# Patient Record
Sex: FEMALE | Race: OTHER | HISPANIC OR LATINO
[De-identification: names, ages, dates, MRNs, and addresses within clinical notes are randomized per-mention and may not be internally consistent; named-entity substitution may affect disease eponyms.]

---

## 2017-01-03 ENCOUNTER — APPOINTMENT (OUTPATIENT)
Dept: INTERNAL MEDICINE | Facility: CLINIC | Age: 47
End: 2017-01-03

## 2017-01-18 ENCOUNTER — RX RENEWAL (OUTPATIENT)
Age: 47
End: 2017-01-18

## 2017-01-19 ENCOUNTER — RECORD ABSTRACTING (OUTPATIENT)
Age: 47
End: 2017-01-19

## 2017-02-07 ENCOUNTER — APPOINTMENT (OUTPATIENT)
Dept: INTERNAL MEDICINE | Facility: CLINIC | Age: 47
End: 2017-02-07

## 2017-02-07 VITALS
SYSTOLIC BLOOD PRESSURE: 143 MMHG | WEIGHT: 146 LBS | BODY MASS INDEX: 29.43 KG/M2 | DIASTOLIC BLOOD PRESSURE: 60 MMHG | HEIGHT: 59 IN

## 2017-02-07 VITALS — BODY MASS INDEX: 29.43 KG/M2 | WEIGHT: 146 LBS | HEIGHT: 59 IN

## 2017-02-14 ENCOUNTER — RX RENEWAL (OUTPATIENT)
Age: 47
End: 2017-02-14

## 2017-02-17 LAB
ALBUMIN SERPL-MCNC: 3.7 G/DL
ALBUMIN/GLOB SERPL: 1.28
ALP SERPL-CCNC: 119 IU/L
ALT SERPL-CCNC: 60 IU/L
ANION GAP SERPL CALC-SCNC: 9 MEQ/L
AST SERPL-CCNC: 22 IU/L
BILIRUB SERPL-MCNC: 0.5 MG/DL
BUN SERPL-MCNC: 17 MG/DL
BUN/CREAT SERPL: 36.2 %
CALCIUM SERPL-MCNC: 8.8 MG/DL
CHLORIDE SERPL-SCNC: 106 MEQ/L
CHOLEST SERPL-MCNC: 197 MG/DL
CO2 SERPL-SCNC: 26 MEQ/L
CREAT SERPL-MCNC: 0.47 MG/DL
ESTIMATED AVERGAGE GLUCOSE (NORTH): 120 MG/DL
GFR SERPL CREATININE-BSD FRML MDRD: 141
GLUCOSE SERPL-MCNC: 98 MG/DL
HBA1C MFR BLD: 5.8 %
HDLC SERPL-MCNC: 64 MG/DL
HDLC SERPL: 3.08
LDLC SERPL DIRECT ASSAY-MCNC: 123 MG/DL
POTASSIUM SERPL-SCNC: 4.2 MMOL/L
PROT SERPL-MCNC: 6.6 G/DL
SODIUM SERPL-SCNC: 141 MEQ/L
TRIGL SERPL-MCNC: 113 MG/DL
VLDLC SERPL-MCNC: 22 MG/DL

## 2017-02-28 RX ORDER — DULOXETINE HYDROCHLORIDE 30 MG/1
30 CAPSULE, DELAYED RELEASE PELLETS ORAL
Qty: 30 | Refills: 0 | Status: DISCONTINUED | COMMUNITY
Start: 2017-01-03 | End: 2017-02-28

## 2017-03-15 ENCOUNTER — RX RENEWAL (OUTPATIENT)
Age: 47
End: 2017-03-15

## 2017-03-16 ENCOUNTER — APPOINTMENT (OUTPATIENT)
Dept: SURGERY | Facility: CLINIC | Age: 47
End: 2017-03-16

## 2017-03-21 ENCOUNTER — FORM ENCOUNTER (OUTPATIENT)
Age: 47
End: 2017-03-21

## 2017-04-06 ENCOUNTER — APPOINTMENT (OUTPATIENT)
Dept: SURGERY | Facility: CLINIC | Age: 47
End: 2017-04-06

## 2017-04-07 ENCOUNTER — APPOINTMENT (OUTPATIENT)
Dept: OBGYN | Facility: CLINIC | Age: 47
End: 2017-04-07

## 2017-04-07 VITALS
WEIGHT: 150 LBS | SYSTOLIC BLOOD PRESSURE: 122 MMHG | BODY MASS INDEX: 29.45 KG/M2 | HEIGHT: 60 IN | DIASTOLIC BLOOD PRESSURE: 82 MMHG

## 2017-04-18 RX ORDER — ESCITALOPRAM OXALATE 5 MG/1
5 TABLET ORAL
Qty: 30 | Refills: 0 | Status: DISCONTINUED | COMMUNITY
Start: 2017-03-27 | End: 2017-04-18

## 2017-05-09 ENCOUNTER — APPOINTMENT (OUTPATIENT)
Dept: INTERNAL MEDICINE | Facility: CLINIC | Age: 47
End: 2017-05-09

## 2017-05-09 VITALS
BODY MASS INDEX: 29.45 KG/M2 | HEIGHT: 60 IN | WEIGHT: 150 LBS | SYSTOLIC BLOOD PRESSURE: 104 MMHG | DIASTOLIC BLOOD PRESSURE: 71 MMHG | HEART RATE: 62 BPM

## 2017-05-09 RX ORDER — ESCITALOPRAM OXALATE 20 MG/1
20 TABLET ORAL
Qty: 30 | Refills: 0 | Status: DISCONTINUED | COMMUNITY
Start: 2017-02-28 | End: 2017-05-09

## 2017-05-12 LAB — TSH SERPL DL<=0.005 MIU/L-ACNC: 3.12 UIU/ML

## 2017-05-18 ENCOUNTER — MEDICATION RENEWAL (OUTPATIENT)
Age: 47
End: 2017-05-18

## 2017-05-18 ENCOUNTER — RX RENEWAL (OUTPATIENT)
Age: 47
End: 2017-05-18

## 2017-05-23 ENCOUNTER — APPOINTMENT (OUTPATIENT)
Dept: SURGERY | Facility: CLINIC | Age: 47
End: 2017-05-23

## 2017-05-23 ENCOUNTER — RESULT REVIEW (OUTPATIENT)
Age: 47
End: 2017-05-23

## 2017-05-23 VITALS
WEIGHT: 151 LBS | DIASTOLIC BLOOD PRESSURE: 72 MMHG | BODY MASS INDEX: 29.64 KG/M2 | SYSTOLIC BLOOD PRESSURE: 129 MMHG | HEIGHT: 60 IN

## 2017-05-30 ENCOUNTER — APPOINTMENT (OUTPATIENT)
Dept: INTERNAL MEDICINE | Facility: CLINIC | Age: 47
End: 2017-05-30

## 2017-05-30 VITALS
WEIGHT: 151 LBS | BODY MASS INDEX: 29.64 KG/M2 | SYSTOLIC BLOOD PRESSURE: 124 MMHG | HEIGHT: 60 IN | DIASTOLIC BLOOD PRESSURE: 84 MMHG | HEART RATE: 65 BPM

## 2017-05-30 RX ORDER — HYDROXYZINE HYDROCHLORIDE 25 MG/1
25 TABLET ORAL TWICE DAILY
Qty: 60 | Refills: 4 | Status: DISCONTINUED | COMMUNITY
Start: 2017-02-14 | End: 2017-05-30

## 2017-06-08 ENCOUNTER — OUTPATIENT (OUTPATIENT)
Dept: OUTPATIENT SERVICES | Facility: HOSPITAL | Age: 47
LOS: 1 days | Discharge: HOME | End: 2017-06-08

## 2017-06-08 DIAGNOSIS — M47.812 SPONDYLOSIS WITHOUT MYELOPATHY OR RADICULOPATHY, CERVICAL REGION: ICD-10-CM

## 2017-06-13 ENCOUNTER — APPOINTMENT (OUTPATIENT)
Dept: INTERNAL MEDICINE | Facility: CLINIC | Age: 47
End: 2017-06-13

## 2017-06-13 ENCOUNTER — OTHER (OUTPATIENT)
Age: 47
End: 2017-06-13

## 2017-06-13 VITALS
WEIGHT: 152 LBS | SYSTOLIC BLOOD PRESSURE: 126 MMHG | HEIGHT: 60 IN | BODY MASS INDEX: 29.84 KG/M2 | DIASTOLIC BLOOD PRESSURE: 84 MMHG | HEART RATE: 84 BPM

## 2017-06-13 DIAGNOSIS — M54.2 CERVICALGIA: ICD-10-CM

## 2017-06-13 DIAGNOSIS — Z12.4 ENCOUNTER FOR SCREENING FOR MALIGNANT NEOPLASM OF CERVIX: ICD-10-CM

## 2017-06-13 DIAGNOSIS — R76.11 NONSPECIFIC REACTION TO TUBERCULIN SKIN TEST W/OUT ACTIVE TUBERCULOSIS: ICD-10-CM

## 2017-06-13 DIAGNOSIS — Z86.59 PERSONAL HISTORY OF OTHER MENTAL AND BEHAVIORAL DISORDERS: ICD-10-CM

## 2017-06-13 DIAGNOSIS — J45.902 UNSPECIFIED ASTHMA WITH STATUS ASTHMATICUS: ICD-10-CM

## 2017-06-13 DIAGNOSIS — M54.9 DORSALGIA, UNSPECIFIED: ICD-10-CM

## 2017-06-13 DIAGNOSIS — G89.29 DORSALGIA, UNSPECIFIED: ICD-10-CM

## 2017-06-13 DIAGNOSIS — Z87.42 PERSONAL HISTORY OF OTHER DISEASES OF THE FEMALE GENITAL TRACT: ICD-10-CM

## 2017-06-13 DIAGNOSIS — Z12.72 ENCOUNTER FOR SCREENING FOR MALIGNANT NEOPLASM OF VAGINA: ICD-10-CM

## 2017-06-13 DIAGNOSIS — G89.29 CERVICALGIA: ICD-10-CM

## 2017-06-13 RX ORDER — DOCUSATE SODIUM 100 MG
20.6 (20 BASE) CAPSULE ORAL
Qty: 56 | Refills: 0 | Status: COMPLETED | COMMUNITY
Start: 2017-03-16

## 2017-06-13 RX ORDER — ALBUTEROL SULFATE 90 UG/1
108 (90 BASE) AEROSOL, METERED RESPIRATORY (INHALATION)
Qty: 9 | Refills: 0 | Status: COMPLETED | COMMUNITY
Start: 2017-02-07

## 2017-06-13 RX ORDER — AMOXICILLIN 500 MG/1
500 CAPSULE ORAL
Qty: 28 | Refills: 0 | Status: COMPLETED | COMMUNITY
Start: 2017-03-16

## 2017-06-13 RX ORDER — CLARITHROMYCIN 500 MG/1
500 TABLET, FILM COATED ORAL
Qty: 28 | Refills: 0 | Status: COMPLETED | COMMUNITY
Start: 2017-03-16

## 2017-06-15 ENCOUNTER — APPOINTMENT (OUTPATIENT)
Dept: NUTRITION | Facility: CLINIC | Age: 47
End: 2017-06-15

## 2017-06-16 ENCOUNTER — OUTPATIENT (OUTPATIENT)
Dept: OUTPATIENT SERVICES | Facility: HOSPITAL | Age: 47
LOS: 1 days | Discharge: HOME | End: 2017-06-16

## 2017-06-16 DIAGNOSIS — F43.12 POST-TRAUMATIC STRESS DISORDER, CHRONIC: ICD-10-CM

## 2017-06-28 ENCOUNTER — OUTPATIENT (OUTPATIENT)
Dept: OUTPATIENT SERVICES | Facility: HOSPITAL | Age: 47
LOS: 1 days | Discharge: HOME | End: 2017-06-28

## 2017-06-28 DIAGNOSIS — F33.2 MAJOR DEPRESSIVE DISORDER, RECURRENT SEVERE WITHOUT PSYCHOTIC FEATURES: ICD-10-CM

## 2017-06-28 DIAGNOSIS — M47.812 SPONDYLOSIS WITHOUT MYELOPATHY OR RADICULOPATHY, CERVICAL REGION: ICD-10-CM

## 2017-06-28 DIAGNOSIS — F41.1 GENERALIZED ANXIETY DISORDER: ICD-10-CM

## 2017-06-28 DIAGNOSIS — M54.5 LOW BACK PAIN: ICD-10-CM

## 2017-06-28 DIAGNOSIS — F43.12 POST-TRAUMATIC STRESS DISORDER, CHRONIC: ICD-10-CM

## 2017-06-29 ENCOUNTER — OUTPATIENT (OUTPATIENT)
Dept: OUTPATIENT SERVICES | Facility: HOSPITAL | Age: 47
LOS: 1 days | Discharge: HOME | End: 2017-06-29

## 2017-06-29 ENCOUNTER — APPOINTMENT (OUTPATIENT)
Dept: NUTRITION | Facility: CLINIC | Age: 47
End: 2017-06-29

## 2017-06-29 VITALS — BODY MASS INDEX: 30.47 KG/M2 | WEIGHT: 156 LBS

## 2017-06-29 DIAGNOSIS — K83.8 OTHER SPECIFIED DISEASES OF BILIARY TRACT: ICD-10-CM

## 2017-06-29 DIAGNOSIS — M47.812 SPONDYLOSIS WITHOUT MYELOPATHY OR RADICULOPATHY, CERVICAL REGION: ICD-10-CM

## 2017-07-01 ENCOUNTER — EMERGENCY (EMERGENCY)
Facility: HOSPITAL | Age: 47
LOS: 0 days | Discharge: HOME | End: 2017-07-01
Admitting: INTERNAL MEDICINE

## 2017-07-01 DIAGNOSIS — Z98.890 OTHER SPECIFIED POSTPROCEDURAL STATES: ICD-10-CM

## 2017-07-01 DIAGNOSIS — R21 RASH AND OTHER NONSPECIFIC SKIN ERUPTION: ICD-10-CM

## 2017-07-01 DIAGNOSIS — B02.9 ZOSTER WITHOUT COMPLICATIONS: ICD-10-CM

## 2017-07-01 DIAGNOSIS — M47.812 SPONDYLOSIS WITHOUT MYELOPATHY OR RADICULOPATHY, CERVICAL REGION: ICD-10-CM

## 2017-07-01 DIAGNOSIS — Z79.899 OTHER LONG TERM (CURRENT) DRUG THERAPY: ICD-10-CM

## 2017-07-01 DIAGNOSIS — J45.909 UNSPECIFIED ASTHMA, UNCOMPLICATED: ICD-10-CM

## 2017-07-13 ENCOUNTER — APPOINTMENT (OUTPATIENT)
Dept: SURGERY | Facility: CLINIC | Age: 47
End: 2017-07-13

## 2017-07-13 VITALS
DIASTOLIC BLOOD PRESSURE: 72 MMHG | WEIGHT: 159 LBS | SYSTOLIC BLOOD PRESSURE: 132 MMHG | BODY MASS INDEX: 31.22 KG/M2 | HEIGHT: 60 IN

## 2017-07-14 ENCOUNTER — OUTPATIENT (OUTPATIENT)
Dept: OUTPATIENT SERVICES | Facility: HOSPITAL | Age: 47
LOS: 1 days | Discharge: HOME | End: 2017-07-14

## 2017-07-14 ENCOUNTER — APPOINTMENT (OUTPATIENT)
Dept: INTERNAL MEDICINE | Facility: CLINIC | Age: 47
End: 2017-07-14

## 2017-07-14 VITALS
HEIGHT: 60 IN | BODY MASS INDEX: 31.41 KG/M2 | SYSTOLIC BLOOD PRESSURE: 129 MMHG | HEART RATE: 86 BPM | WEIGHT: 160 LBS | DIASTOLIC BLOOD PRESSURE: 86 MMHG

## 2017-07-14 DIAGNOSIS — K80.20 CALCULUS OF GALLBLADDER W/OUT CHOLECYSTITIS W/OUT OBSTRUCTION: ICD-10-CM

## 2017-07-14 DIAGNOSIS — B02.29 OTHER POSTHERPETIC NERVOUS SYSTEM INVOLVEMENT: ICD-10-CM

## 2017-07-14 DIAGNOSIS — M47.812 SPONDYLOSIS WITHOUT MYELOPATHY OR RADICULOPATHY, CERVICAL REGION: ICD-10-CM

## 2017-07-18 ENCOUNTER — OUTPATIENT (OUTPATIENT)
Dept: OUTPATIENT SERVICES | Facility: HOSPITAL | Age: 47
LOS: 1 days | Discharge: HOME | End: 2017-07-18

## 2017-07-18 DIAGNOSIS — M47.812 SPONDYLOSIS WITHOUT MYELOPATHY OR RADICULOPATHY, CERVICAL REGION: ICD-10-CM

## 2017-07-18 DIAGNOSIS — Z01.818 ENCOUNTER FOR OTHER PREPROCEDURAL EXAMINATION: ICD-10-CM

## 2017-07-18 DIAGNOSIS — K40.90 UNILATERAL INGUINAL HERNIA, WITHOUT OBSTRUCTION OR GANGRENE, NOT SPECIFIED AS RECURRENT: ICD-10-CM

## 2017-07-18 DIAGNOSIS — E66.9 OBESITY, UNSPECIFIED: ICD-10-CM

## 2017-07-18 DIAGNOSIS — J45.909 UNSPECIFIED ASTHMA, UNCOMPLICATED: ICD-10-CM

## 2017-07-21 LAB — HIV1+2 AB SPEC QL IA.RAPID: NONREACTIVE

## 2017-07-28 ENCOUNTER — OUTPATIENT (OUTPATIENT)
Dept: OUTPATIENT SERVICES | Facility: HOSPITAL | Age: 47
LOS: 1 days | Discharge: HOME | End: 2017-07-28

## 2017-07-28 DIAGNOSIS — K83.8 OTHER SPECIFIED DISEASES OF BILIARY TRACT: ICD-10-CM

## 2017-07-28 DIAGNOSIS — K80.12 CALCULUS OF GALLBLADDER WITH ACUTE AND CHRONIC CHOLECYSTITIS WITHOUT OBSTRUCTION: ICD-10-CM

## 2017-07-28 DIAGNOSIS — K82.1 HYDROPS OF GALLBLADDER: ICD-10-CM

## 2017-07-28 DIAGNOSIS — M47.812 SPONDYLOSIS WITHOUT MYELOPATHY OR RADICULOPATHY, CERVICAL REGION: ICD-10-CM

## 2017-07-28 DIAGNOSIS — K82.8 OTHER SPECIFIED DISEASES OF GALLBLADDER: ICD-10-CM

## 2017-08-10 ENCOUNTER — APPOINTMENT (OUTPATIENT)
Dept: SURGERY | Facility: CLINIC | Age: 47
End: 2017-08-10
Payer: MEDICAID

## 2017-08-10 VITALS
WEIGHT: 159 LBS | BODY MASS INDEX: 31.22 KG/M2 | HEIGHT: 60 IN | SYSTOLIC BLOOD PRESSURE: 130 MMHG | DIASTOLIC BLOOD PRESSURE: 84 MMHG

## 2017-08-10 PROCEDURE — 99024 POSTOP FOLLOW-UP VISIT: CPT

## 2017-08-18 ENCOUNTER — APPOINTMENT (OUTPATIENT)
Dept: INTERNAL MEDICINE | Facility: CLINIC | Age: 47
End: 2017-08-18

## 2017-08-22 ENCOUNTER — APPOINTMENT (OUTPATIENT)
Dept: INTERNAL MEDICINE | Facility: CLINIC | Age: 47
End: 2017-08-22

## 2017-08-22 ENCOUNTER — APPOINTMENT (OUTPATIENT)
Age: 47
End: 2017-08-22

## 2017-08-22 ENCOUNTER — OUTPATIENT (OUTPATIENT)
Dept: OUTPATIENT SERVICES | Facility: HOSPITAL | Age: 47
LOS: 1 days | Discharge: HOME | End: 2017-08-22

## 2017-08-22 VITALS
DIASTOLIC BLOOD PRESSURE: 83 MMHG | HEART RATE: 81 BPM | SYSTOLIC BLOOD PRESSURE: 130 MMHG | BODY MASS INDEX: 31.02 KG/M2 | HEIGHT: 60 IN | WEIGHT: 158 LBS

## 2017-08-22 DIAGNOSIS — M47.812 SPONDYLOSIS WITHOUT MYELOPATHY OR RADICULOPATHY, CERVICAL REGION: ICD-10-CM

## 2017-08-22 DIAGNOSIS — G89.29 OTHER CHRONIC PAIN: ICD-10-CM

## 2017-09-25 ENCOUNTER — FORM ENCOUNTER (OUTPATIENT)
Age: 47
End: 2017-09-25

## 2017-09-26 ENCOUNTER — OUTPATIENT (OUTPATIENT)
Dept: OUTPATIENT SERVICES | Facility: HOSPITAL | Age: 47
LOS: 1 days | Discharge: HOME | End: 2017-09-26

## 2017-09-26 DIAGNOSIS — M47.812 SPONDYLOSIS WITHOUT MYELOPATHY OR RADICULOPATHY, CERVICAL REGION: ICD-10-CM

## 2017-09-26 DIAGNOSIS — R92.8 OTHER ABNORMAL AND INCONCLUSIVE FINDINGS ON DIAGNOSTIC IMAGING OF BREAST: ICD-10-CM

## 2017-10-04 ENCOUNTER — APPOINTMENT (OUTPATIENT)
Dept: NUTRITION | Facility: CLINIC | Age: 47
End: 2017-10-04

## 2017-10-13 ENCOUNTER — OUTPATIENT (OUTPATIENT)
Dept: OUTPATIENT SERVICES | Facility: HOSPITAL | Age: 47
LOS: 1 days | Discharge: HOME | End: 2017-10-13

## 2017-10-13 ENCOUNTER — APPOINTMENT (OUTPATIENT)
Dept: OBGYN | Facility: CLINIC | Age: 47
End: 2017-10-13

## 2017-10-13 VITALS — SYSTOLIC BLOOD PRESSURE: 120 MMHG | WEIGHT: 156 LBS | BODY MASS INDEX: 30.47 KG/M2 | DIASTOLIC BLOOD PRESSURE: 70 MMHG

## 2017-10-13 DIAGNOSIS — M47.812 SPONDYLOSIS WITHOUT MYELOPATHY OR RADICULOPATHY, CERVICAL REGION: ICD-10-CM

## 2017-10-18 ENCOUNTER — RESULT REVIEW (OUTPATIENT)
Age: 47
End: 2017-10-18

## 2017-10-23 DIAGNOSIS — Z01.419 ENCOUNTER FOR GYNECOLOGICAL EXAMINATION (GENERAL) (ROUTINE) WITHOUT ABNORMAL FINDINGS: ICD-10-CM

## 2017-10-23 LAB — HPV I/H RISK 1 DNA CVX QL PROBE+SIG AMP: NOT DETECTED

## 2017-11-10 ENCOUNTER — APPOINTMENT (OUTPATIENT)
Dept: INTERNAL MEDICINE | Facility: CLINIC | Age: 47
End: 2017-11-10

## 2017-11-10 ENCOUNTER — OUTPATIENT (OUTPATIENT)
Dept: OUTPATIENT SERVICES | Facility: HOSPITAL | Age: 47
LOS: 1 days | Discharge: HOME | End: 2017-11-10

## 2017-11-10 VITALS
HEIGHT: 60 IN | BODY MASS INDEX: 31.22 KG/M2 | SYSTOLIC BLOOD PRESSURE: 141 MMHG | WEIGHT: 159 LBS | HEART RATE: 86 BPM | DIASTOLIC BLOOD PRESSURE: 90 MMHG

## 2017-11-10 DIAGNOSIS — M47.812 SPONDYLOSIS WITHOUT MYELOPATHY OR RADICULOPATHY, CERVICAL REGION: ICD-10-CM

## 2017-11-21 ENCOUNTER — APPOINTMENT (OUTPATIENT)
Dept: INTERNAL MEDICINE | Facility: CLINIC | Age: 47
End: 2017-11-21

## 2017-12-22 ENCOUNTER — OUTPATIENT (OUTPATIENT)
Dept: OUTPATIENT SERVICES | Facility: HOSPITAL | Age: 47
LOS: 1 days | Discharge: HOME | End: 2017-12-22

## 2017-12-22 ENCOUNTER — APPOINTMENT (OUTPATIENT)
Dept: INTERNAL MEDICINE | Facility: CLINIC | Age: 47
End: 2017-12-22

## 2017-12-22 VITALS
WEIGHT: 150 LBS | HEIGHT: 60 IN | DIASTOLIC BLOOD PRESSURE: 60 MMHG | SYSTOLIC BLOOD PRESSURE: 142 MMHG | BODY MASS INDEX: 29.45 KG/M2 | HEART RATE: 67 BPM

## 2017-12-22 DIAGNOSIS — M47.812 SPONDYLOSIS WITHOUT MYELOPATHY OR RADICULOPATHY, CERVICAL REGION: ICD-10-CM

## 2018-01-02 LAB
CHOLEST SERPL-MCNC: 173 MG/DL
ESTIMATED AVERGAGE GLUCOSE (NORTH): 126 MG/DL
HBA1C MFR BLD: 6 %
HDLC SERPL-MCNC: 40 MG/DL
HDLC SERPL: 4.33
LDLC SERPL DIRECT ASSAY-MCNC: 104 MG/DL
TRIGL SERPL-MCNC: 160 MG/DL
TSH SERPL DL<=0.005 MIU/L-ACNC: 1.94 UIU/ML
VLDLC SERPL-MCNC: 32 MG/DL

## 2018-02-02 ENCOUNTER — APPOINTMENT (OUTPATIENT)
Dept: INTERNAL MEDICINE | Facility: CLINIC | Age: 48
End: 2018-02-02

## 2018-02-02 ENCOUNTER — OUTPATIENT (OUTPATIENT)
Dept: OUTPATIENT SERVICES | Facility: HOSPITAL | Age: 48
LOS: 1 days | Discharge: HOME | End: 2018-02-02

## 2018-02-02 VITALS
BODY MASS INDEX: 29.64 KG/M2 | SYSTOLIC BLOOD PRESSURE: 107 MMHG | WEIGHT: 151 LBS | DIASTOLIC BLOOD PRESSURE: 74 MMHG | HEART RATE: 77 BPM | HEIGHT: 60 IN

## 2018-02-27 ENCOUNTER — OTHER (OUTPATIENT)
Age: 48
End: 2018-02-27

## 2018-03-25 ENCOUNTER — EMERGENCY (EMERGENCY)
Facility: HOSPITAL | Age: 48
LOS: 0 days | Discharge: HOME | End: 2018-03-25
Attending: STUDENT IN AN ORGANIZED HEALTH CARE EDUCATION/TRAINING PROGRAM

## 2018-03-25 VITALS
HEART RATE: 79 BPM | SYSTOLIC BLOOD PRESSURE: 177 MMHG | RESPIRATION RATE: 18 BRPM | OXYGEN SATURATION: 99 % | TEMPERATURE: 98 F | DIASTOLIC BLOOD PRESSURE: 100 MMHG

## 2018-03-25 DIAGNOSIS — M62.830 MUSCLE SPASM OF BACK: ICD-10-CM

## 2018-03-25 DIAGNOSIS — R51 HEADACHE: ICD-10-CM
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
## 2018-03-25 RX ORDER — METOCLOPRAMIDE HCL 10 MG
10 TABLET ORAL ONCE
Qty: 0 | Refills: 0 | Status: COMPLETED | OUTPATIENT
Start: 2018-03-25 | End: 2018-03-25

## 2018-03-25 RX ORDER — METHOCARBAMOL 500 MG/1
1000 TABLET, FILM COATED ORAL ONCE
Qty: 0 | Refills: 0 | Status: COMPLETED | OUTPATIENT
Start: 2018-03-25 | End: 2018-03-25

## 2018-03-25 RX ORDER — KETOROLAC TROMETHAMINE 30 MG/ML
30 SYRINGE (ML) INJECTION ONCE
Qty: 0 | Refills: 0 | Status: DISCONTINUED | OUTPATIENT
Start: 2018-03-25 | End: 2018-03-25

## 2018-03-25 RX ADMIN — Medication 10 MILLIGRAM(S): at 01:58

## 2018-03-25 RX ADMIN — Medication 30 MILLIGRAM(S): at 01:57

## 2018-03-25 RX ADMIN — METHOCARBAMOL 1000 MILLIGRAM(S): 500 TABLET, FILM COATED ORAL at 01:57

## 2018-03-25 NOTE — ED PROVIDER NOTE - OBJECTIVE STATEMENT
47 yo F with a hx of chronic back pain s/p cervical spine surgery and lumbar bulging disc, asthma, presents with neck and back pain. Started on 3/9 after a swinger door hit her on the right side. Progressively worsened, today pain became constant. Described as cramping, squeezing, tightness. Denies blurred vision, HA, fevers, chills, CP, SOB, new weakness, saddle anesthesia, bladder or bowel incontinence, foot drop. Patient ambulates with cane at baseline secondary to chronic low back pain. Sees N[facility] Dr. Strange.

## 2018-03-25 NOTE — ED PROVIDER NOTE - NS ED ROS FT
Constitutional: See HPI.  Eyes: No visual changes, eye pain or discharge.  ENMT: No hearing changes, pain, discharge or infections. +neck pain or stiffness.  Cardiac: No chest pain, SOB or edema. No chest pain with exertion.  Respiratory: No cough or respiratory distress.   GI: No nausea, vomiting, diarrhea or abdominal pain.  : No dysuria, frequency or burning.  MS: No myalgia, muscle weakness, joint pain. + back pain.  Neuro: No headache or weakness. No LOC.  Skin: No skin rash.

## 2018-03-25 NOTE — ED PROVIDER NOTE - PHYSICAL EXAMINATION
AOx4, visibly uncomfortable, speaking in full sentences. Skin  warm and dry, no acute rash. Head normocephalic, atraumatic. PERRLA/EOMI, conjunctiva and sclera clear. MM moist, no nasal discharge.  Pharynx and TM's unremarkable.  No mastoid or temporal ttp. Neck tender paracervically on right, notable spasm in trapezius. Heart RRR s1s2 nl, no rub/murmur. Lungs- No retractions, BS equal, CTAB. Abdomen soft ntnd no r/g. Extremities- moves all, +equal distal pulses, brisk cap refill, sensation wnl, normal ROM. No LE edema, calves nttp b/l. AOx4, CN 2-12 grossly intact. +5/5 strength and sensation wnl in all extremities. No pronator drift, no dysarthria, no ataxia, normal gait.

## 2018-03-25 NOTE — ED PROVIDER NOTE - ATTENDING CONTRIBUTION TO CARE
49 yo F pmh as noted, hx cspine surgery and lumbar herniated disc, p/w R sided neck and back pain x about 2 wks s/p being hit by swinging car door.  Felt ok then, then increasing mm spasm.  worse w/ movment, better in some position.  NO neuro deficit. hematuria/ dysuria, bowel or bladder issues.  PE as noted, pt has sig mm spasm R para cerv and lumbar spine, neuro exam + some paresthesia to L thigh, similar to prior o/w nL motor, grossly non focal; non spinal ttp; pain worse w/ position change.  IMP: MM spasm.  P: analgesia, mm relaxant, reassess.

## 2018-03-31 ENCOUNTER — LABORATORY RESULT (OUTPATIENT)
Age: 48
End: 2018-03-31

## 2018-03-31 ENCOUNTER — OUTPATIENT (OUTPATIENT)
Dept: OUTPATIENT SERVICES | Facility: HOSPITAL | Age: 48
LOS: 1 days | Discharge: HOME | End: 2018-03-31

## 2018-03-31 DIAGNOSIS — Z00.00 ENCOUNTER FOR GENERAL ADULT MEDICAL EXAMINATION WITHOUT ABNORMAL FINDINGS: ICD-10-CM

## 2018-04-06 ENCOUNTER — EMERGENCY (EMERGENCY)
Facility: HOSPITAL | Age: 48
LOS: 0 days | Discharge: HOME | End: 2018-04-06
Attending: STUDENT IN AN ORGANIZED HEALTH CARE EDUCATION/TRAINING PROGRAM | Admitting: INTERNAL MEDICINE

## 2018-04-06 ENCOUNTER — APPOINTMENT (OUTPATIENT)
Dept: INTERNAL MEDICINE | Facility: CLINIC | Age: 48
End: 2018-04-06

## 2018-04-06 ENCOUNTER — OUTPATIENT (OUTPATIENT)
Dept: OUTPATIENT SERVICES | Facility: HOSPITAL | Age: 48
LOS: 1 days | Discharge: HOME | End: 2018-04-06

## 2018-04-06 VITALS
RESPIRATION RATE: 17 BRPM | SYSTOLIC BLOOD PRESSURE: 138 MMHG | DIASTOLIC BLOOD PRESSURE: 91 MMHG | TEMPERATURE: 97 F | HEART RATE: 67 BPM | OXYGEN SATURATION: 98 %

## 2018-04-06 VITALS
SYSTOLIC BLOOD PRESSURE: 141 MMHG | HEART RATE: 76 BPM | DIASTOLIC BLOOD PRESSURE: 85 MMHG | BODY MASS INDEX: 29.25 KG/M2 | WEIGHT: 149 LBS | HEIGHT: 60 IN

## 2018-04-06 DIAGNOSIS — Z79.899 OTHER LONG TERM (CURRENT) DRUG THERAPY: ICD-10-CM

## 2018-04-06 DIAGNOSIS — Z90.49 ACQUIRED ABSENCE OF OTHER SPECIFIED PARTS OF DIGESTIVE TRACT: ICD-10-CM

## 2018-04-06 DIAGNOSIS — Z79.02 LONG TERM (CURRENT) USE OF ANTITHROMBOTICS/ANTIPLATELETS: ICD-10-CM

## 2018-04-06 DIAGNOSIS — J45.909 UNSPECIFIED ASTHMA, UNCOMPLICATED: ICD-10-CM

## 2018-04-06 DIAGNOSIS — Z98.890 OTHER SPECIFIED POSTPROCEDURAL STATES: Chronic | ICD-10-CM

## 2018-04-06 DIAGNOSIS — R10.13 EPIGASTRIC PAIN: ICD-10-CM

## 2018-04-06 DIAGNOSIS — K21.9 GASTRO-ESOPHAGEAL REFLUX DISEASE WITHOUT ESOPHAGITIS: ICD-10-CM

## 2018-04-06 DIAGNOSIS — Z79.51 LONG TERM (CURRENT) USE OF INHALED STEROIDS: ICD-10-CM

## 2018-04-06 DIAGNOSIS — Z79.52 LONG TERM (CURRENT) USE OF SYSTEMIC STEROIDS: ICD-10-CM

## 2018-04-06 DIAGNOSIS — Z98.890 OTHER SPECIFIED POSTPROCEDURAL STATES: ICD-10-CM

## 2018-04-06 DIAGNOSIS — F32.9 MAJOR DEPRESSIVE DISORDER, SINGLE EPISODE, UNSPECIFIED: ICD-10-CM

## 2018-04-06 DIAGNOSIS — Z79.82 LONG TERM (CURRENT) USE OF ASPIRIN: ICD-10-CM

## 2018-04-06 DIAGNOSIS — Z79.891 LONG TERM (CURRENT) USE OF OPIATE ANALGESIC: ICD-10-CM

## 2018-04-06 LAB
ALBUMIN SERPL ELPH-MCNC: 4.3 G/DL — SIGNIFICANT CHANGE UP (ref 3.5–5.2)
ALP SERPL-CCNC: 128 U/L — HIGH (ref 30–115)
ALT FLD-CCNC: 18 U/L — SIGNIFICANT CHANGE UP (ref 0–41)
ANION GAP SERPL CALC-SCNC: 14 MMOL/L — SIGNIFICANT CHANGE UP (ref 7–14)
AST SERPL-CCNC: 10 U/L — SIGNIFICANT CHANGE UP (ref 0–41)
BASOPHILS # BLD AUTO: 0.02 K/UL — SIGNIFICANT CHANGE UP (ref 0–0.2)
BASOPHILS NFR BLD AUTO: 0.3 % — SIGNIFICANT CHANGE UP (ref 0–1)
BILIRUB DIRECT SERPL-MCNC: <0.2 MG/DL — SIGNIFICANT CHANGE UP (ref 0–0.2)
BILIRUB INDIRECT FLD-MCNC: >0.1 MG/DL — LOW (ref 0.2–1.2)
BILIRUB SERPL-MCNC: 0.3 MG/DL — SIGNIFICANT CHANGE UP (ref 0.2–1.2)
BUN SERPL-MCNC: 12 MG/DL — SIGNIFICANT CHANGE UP (ref 10–20)
CALCIUM SERPL-MCNC: 9.1 MG/DL — SIGNIFICANT CHANGE UP (ref 8.5–10.1)
CHLORIDE SERPL-SCNC: 104 MMOL/L — SIGNIFICANT CHANGE UP (ref 98–110)
CO2 SERPL-SCNC: 26 MMOL/L — SIGNIFICANT CHANGE UP (ref 17–32)
CREAT SERPL-MCNC: 0.5 MG/DL — LOW (ref 0.7–1.5)
EOSINOPHIL # BLD AUTO: 0.04 K/UL — SIGNIFICANT CHANGE UP (ref 0–0.7)
EOSINOPHIL NFR BLD AUTO: 0.7 % — SIGNIFICANT CHANGE UP (ref 0–8)
GLUCOSE SERPL-MCNC: 94 MG/DL — SIGNIFICANT CHANGE UP (ref 70–99)
HCG SERPL-ACNC: <0.6 MIU/ML — SIGNIFICANT CHANGE UP (ref 0–5)
HCT VFR BLD CALC: 36.9 % — LOW (ref 37–47)
HGB BLD-MCNC: 12.5 G/DL — SIGNIFICANT CHANGE UP (ref 12–16)
IMM GRANULOCYTES NFR BLD AUTO: 0.3 % — SIGNIFICANT CHANGE UP (ref 0.1–0.3)
LACTATE SERPL-SCNC: 1 MMOL/L — SIGNIFICANT CHANGE UP (ref 0.5–2.2)
LIDOCAIN IGE QN: 17 U/L — SIGNIFICANT CHANGE UP (ref 7–60)
LYMPHOCYTES # BLD AUTO: 2.16 K/UL — SIGNIFICANT CHANGE UP (ref 1.2–3.4)
LYMPHOCYTES # BLD AUTO: 36.3 % — SIGNIFICANT CHANGE UP (ref 20.5–51.1)
MCHC RBC-ENTMCNC: 28.9 PG — SIGNIFICANT CHANGE UP (ref 27–31)
MCHC RBC-ENTMCNC: 33.9 G/DL — SIGNIFICANT CHANGE UP (ref 32–37)
MCV RBC AUTO: 85.4 FL — SIGNIFICANT CHANGE UP (ref 81–99)
MONOCYTES # BLD AUTO: 0.34 K/UL — SIGNIFICANT CHANGE UP (ref 0.1–0.6)
MONOCYTES NFR BLD AUTO: 5.7 % — SIGNIFICANT CHANGE UP (ref 1.7–9.3)
NEUTROPHILS # BLD AUTO: 3.37 K/UL — SIGNIFICANT CHANGE UP (ref 1.4–6.5)
NEUTROPHILS NFR BLD AUTO: 56.7 % — SIGNIFICANT CHANGE UP (ref 42.2–75.2)
NRBC # BLD: 0 /100 WBCS — SIGNIFICANT CHANGE UP (ref 0–0)
PLATELET # BLD AUTO: 194 K/UL — SIGNIFICANT CHANGE UP (ref 130–400)
POTASSIUM SERPL-MCNC: 4.4 MMOL/L — SIGNIFICANT CHANGE UP (ref 3.5–5)
POTASSIUM SERPL-SCNC: 4.4 MMOL/L — SIGNIFICANT CHANGE UP (ref 3.5–5)
PROT SERPL-MCNC: 7.2 G/DL — SIGNIFICANT CHANGE UP (ref 6–8)
RBC # BLD: 4.32 M/UL — SIGNIFICANT CHANGE UP (ref 4.2–5.4)
RBC # FLD: 12.6 % — SIGNIFICANT CHANGE UP (ref 11.5–14.5)
SODIUM SERPL-SCNC: 144 MMOL/L — SIGNIFICANT CHANGE UP (ref 135–146)
TROPONIN T SERPL-MCNC: <0.01 NG/ML — SIGNIFICANT CHANGE UP
WBC # BLD: 5.95 K/UL — SIGNIFICANT CHANGE UP (ref 4.8–10.8)
WBC # FLD AUTO: 5.95 K/UL — SIGNIFICANT CHANGE UP (ref 4.8–10.8)

## 2018-04-06 RX ORDER — SODIUM CHLORIDE 9 MG/ML
3 INJECTION INTRAMUSCULAR; INTRAVENOUS; SUBCUTANEOUS ONCE
Qty: 0 | Refills: 0 | Status: COMPLETED | OUTPATIENT
Start: 2018-04-06 | End: 2018-04-06

## 2018-04-06 RX ADMIN — SODIUM CHLORIDE 3 MILLILITER(S): 9 INJECTION INTRAMUSCULAR; INTRAVENOUS; SUBCUTANEOUS at 17:57

## 2018-04-06 NOTE — ED ADULT NURSE NOTE - PMH
Anxiety    Asthma, unspecified asthma severity, unspecified whether complicated, unspecified whether persistent    Chronic back pain, unspecified back location, unspecified back pain laterality    Depression, unspecified depression type    Gastroesophageal reflux disease, esophagitis presence not specified    History of cholecystectomy    PTSD (post-traumatic stress disorder)

## 2018-04-06 NOTE — ED ADULT NURSE NOTE - OBJECTIVE STATEMENT
Pt sent from [facility] for evaluation of intermittent upper abdominal pressure for a few days. Pt denies N/V/D. States she usually has BM every day, now every other day.

## 2018-04-06 NOTE — ED PROVIDER NOTE - NS ED ROS FT
Constitutional:  See HPI  Eyes:  No visual changes  ENMT: No neck pain or stiffness  Cardiac:  No chest pain  Respiratory:  No cough or respiratory distress.   GI:  See HPI  :  No dysuria, frequency or burning.  MS:  No back pain.  Neuro:  No headache   Skin:  No skin rash  Except as documented in the HPI,  all other systems are negative

## 2018-04-06 NOTE — ED PROVIDER NOTE - PHYSICAL EXAMINATION
VITAL SIGNS: I have reviewed nursing notes and confirm.  CONSTITUTIONAL: NAD  SKIN: Warm dry  HEAD:NCAT  EYES: NL inspection  ENT: MMM  NECK: Supple; non tender.  CARD: RRR  RESP: CTAB  ABD: S/NT no R/G  EXT: no pedal edema  NEURO: Grossly unremarkable  PSYCH: Cooperative, appropriate.

## 2018-04-06 NOTE — ED PROVIDER NOTE - OBJECTIVE STATEMENT
47 y/o F pmh as noted, sent by pmd for eval intermittent epigastric pain x > 1 yr.  NO clear exacerbating or relieving factors.  no v/n/d.  NO fever.  No cp or SOB.  Had paroxysm of pain at pmd so sent here, but sx resolved PTA.  Pt has no complaints now, identifies pain and resolution as typical of all prior episodes.  gradual onset.  pain "sharp".

## 2018-04-09 ENCOUNTER — OUTPATIENT (OUTPATIENT)
Dept: OUTPATIENT SERVICES | Facility: HOSPITAL | Age: 48
LOS: 1 days | Discharge: HOME | End: 2018-04-09

## 2018-04-09 DIAGNOSIS — Z98.890 OTHER SPECIFIED POSTPROCEDURAL STATES: Chronic | ICD-10-CM

## 2018-04-09 DIAGNOSIS — M54.2 CERVICALGIA: ICD-10-CM

## 2018-04-09 DIAGNOSIS — M54.5 LOW BACK PAIN: ICD-10-CM

## 2018-04-10 ENCOUNTER — OUTPATIENT (OUTPATIENT)
Dept: OUTPATIENT SERVICES | Facility: HOSPITAL | Age: 48
LOS: 1 days | Discharge: HOME | End: 2018-04-10

## 2018-04-10 DIAGNOSIS — M50.01 CERVICAL DISC DISORDER WITH MYELOPATHY, HIGH CERVICAL REGION: ICD-10-CM

## 2018-04-10 DIAGNOSIS — M51.14 INTERVERTEBRAL DISC DISORDERS WITH RADICULOPATHY, THORACIC REGION: ICD-10-CM

## 2018-04-10 DIAGNOSIS — Z98.890 OTHER SPECIFIED POSTPROCEDURAL STATES: Chronic | ICD-10-CM

## 2018-04-10 DIAGNOSIS — M54.6 PAIN IN THORACIC SPINE: ICD-10-CM

## 2018-05-16 ENCOUNTER — OUTPATIENT (OUTPATIENT)
Dept: OUTPATIENT SERVICES | Facility: HOSPITAL | Age: 48
LOS: 1 days | Discharge: HOME | End: 2018-05-16

## 2018-05-16 VITALS
TEMPERATURE: 98 F | DIASTOLIC BLOOD PRESSURE: 72 MMHG | RESPIRATION RATE: 18 BRPM | WEIGHT: 149.91 LBS | SYSTOLIC BLOOD PRESSURE: 149 MMHG | HEART RATE: 64 BPM | OXYGEN SATURATION: 98 %

## 2018-05-16 DIAGNOSIS — M47.812 SPONDYLOSIS WITHOUT MYELOPATHY OR RADICULOPATHY, CERVICAL REGION: ICD-10-CM

## 2018-05-16 DIAGNOSIS — Z98.890 OTHER SPECIFIED POSTPROCEDURAL STATES: Chronic | ICD-10-CM

## 2018-05-16 DIAGNOSIS — Z01.818 ENCOUNTER FOR OTHER PREPROCEDURAL EXAMINATION: ICD-10-CM

## 2018-05-16 LAB
ALBUMIN SERPL ELPH-MCNC: 4.5 G/DL — SIGNIFICANT CHANGE UP (ref 3.5–5.2)
ALP SERPL-CCNC: 149 U/L — HIGH (ref 30–115)
ALT FLD-CCNC: 25 U/L — SIGNIFICANT CHANGE UP (ref 0–41)
ANION GAP SERPL CALC-SCNC: 13 MMOL/L — SIGNIFICANT CHANGE UP (ref 7–14)
APPEARANCE UR: (no result)
APTT BLD: 29.2 SEC — SIGNIFICANT CHANGE UP (ref 27–39.2)
AST SERPL-CCNC: 18 U/L — SIGNIFICANT CHANGE UP (ref 0–41)
BACTERIA # UR AUTO: (no result) /HPF
BASOPHILS # BLD AUTO: 0.01 K/UL — SIGNIFICANT CHANGE UP (ref 0–0.2)
BASOPHILS NFR BLD AUTO: 0.2 % — SIGNIFICANT CHANGE UP (ref 0–1)
BILIRUB SERPL-MCNC: 0.3 MG/DL — SIGNIFICANT CHANGE UP (ref 0.2–1.2)
BILIRUB UR-MCNC: NEGATIVE — SIGNIFICANT CHANGE UP
BLD GP AB SCN SERPL QL: SIGNIFICANT CHANGE UP
BUN SERPL-MCNC: 9 MG/DL — LOW (ref 10–20)
CALCIUM SERPL-MCNC: 9.4 MG/DL — SIGNIFICANT CHANGE UP (ref 8.5–10.1)
CHLORIDE SERPL-SCNC: 102 MMOL/L — SIGNIFICANT CHANGE UP (ref 98–110)
CO2 SERPL-SCNC: 29 MMOL/L — SIGNIFICANT CHANGE UP (ref 17–32)
COLOR SPEC: YELLOW — SIGNIFICANT CHANGE UP
CREAT SERPL-MCNC: 0.6 MG/DL — LOW (ref 0.7–1.5)
DIFF PNL FLD: NEGATIVE — SIGNIFICANT CHANGE UP
EOSINOPHIL # BLD AUTO: 0.05 K/UL — SIGNIFICANT CHANGE UP (ref 0–0.7)
EOSINOPHIL NFR BLD AUTO: 0.9 % — SIGNIFICANT CHANGE UP (ref 0–8)
EPI CELLS # UR: (no result) /HPF
GLUCOSE SERPL-MCNC: 91 MG/DL — SIGNIFICANT CHANGE UP (ref 70–99)
GLUCOSE UR QL: NEGATIVE MG/DL — SIGNIFICANT CHANGE UP
HCT VFR BLD CALC: 37.4 % — SIGNIFICANT CHANGE UP (ref 37–47)
HGB BLD-MCNC: 12.4 G/DL — SIGNIFICANT CHANGE UP (ref 12–16)
IMM GRANULOCYTES NFR BLD AUTO: 0.2 % — SIGNIFICANT CHANGE UP (ref 0.1–0.3)
INR BLD: 1.05 RATIO — SIGNIFICANT CHANGE UP (ref 0.65–1.3)
KETONES UR-MCNC: NEGATIVE — SIGNIFICANT CHANGE UP
LEUKOCYTE ESTERASE UR-ACNC: NEGATIVE — SIGNIFICANT CHANGE UP
LYMPHOCYTES # BLD AUTO: 2.45 K/UL — SIGNIFICANT CHANGE UP (ref 1.2–3.4)
LYMPHOCYTES # BLD AUTO: 42.6 % — SIGNIFICANT CHANGE UP (ref 20.5–51.1)
MCHC RBC-ENTMCNC: 28.8 PG — SIGNIFICANT CHANGE UP (ref 27–31)
MCHC RBC-ENTMCNC: 33.2 G/DL — SIGNIFICANT CHANGE UP (ref 32–37)
MCV RBC AUTO: 86.8 FL — SIGNIFICANT CHANGE UP (ref 81–99)
MONOCYTES # BLD AUTO: 0.32 K/UL — SIGNIFICANT CHANGE UP (ref 0.1–0.6)
MONOCYTES NFR BLD AUTO: 5.6 % — SIGNIFICANT CHANGE UP (ref 1.7–9.3)
NEUTROPHILS # BLD AUTO: 2.91 K/UL — SIGNIFICANT CHANGE UP (ref 1.4–6.5)
NEUTROPHILS NFR BLD AUTO: 50.5 % — SIGNIFICANT CHANGE UP (ref 42.2–75.2)
NITRITE UR-MCNC: NEGATIVE — SIGNIFICANT CHANGE UP
NRBC # BLD: 0 /100 WBCS — SIGNIFICANT CHANGE UP (ref 0–0)
PH UR: 6.5 — SIGNIFICANT CHANGE UP (ref 5–8)
PLATELET # BLD AUTO: 185 K/UL — SIGNIFICANT CHANGE UP (ref 130–400)
POTASSIUM SERPL-MCNC: 4.4 MMOL/L — SIGNIFICANT CHANGE UP (ref 3.5–5)
POTASSIUM SERPL-SCNC: 4.4 MMOL/L — SIGNIFICANT CHANGE UP (ref 3.5–5)
PROT SERPL-MCNC: 7.4 G/DL — SIGNIFICANT CHANGE UP (ref 6–8)
PROT UR-MCNC: NEGATIVE MG/DL — SIGNIFICANT CHANGE UP
PROTHROM AB SERPL-ACNC: 11.3 SEC — SIGNIFICANT CHANGE UP (ref 9.95–12.87)
RBC # BLD: 4.31 M/UL — SIGNIFICANT CHANGE UP (ref 4.2–5.4)
RBC # FLD: 12.2 % — SIGNIFICANT CHANGE UP (ref 11.5–14.5)
SODIUM SERPL-SCNC: 144 MMOL/L — SIGNIFICANT CHANGE UP (ref 135–146)
SP GR SPEC: 1.01 — SIGNIFICANT CHANGE UP (ref 1.01–1.03)
TYPE + AB SCN PNL BLD: SIGNIFICANT CHANGE UP
UROBILINOGEN FLD QL: 0.2 MG/DL — SIGNIFICANT CHANGE UP (ref 0.2–0.2)
WBC # BLD: 5.75 K/UL — SIGNIFICANT CHANGE UP (ref 4.8–10.8)
WBC # FLD AUTO: 5.75 K/UL — SIGNIFICANT CHANGE UP (ref 4.8–10.8)
WBC UR QL: SIGNIFICANT CHANGE UP /HPF

## 2018-05-16 NOTE — H&P PST ADULT - ATTENDING COMMENTS
05-30-18 @ 07:07  BRANDYN RIOS  [id number]  48yFemale    I have discussed the risks and benefits of TLIF with the patient including but not limited to bleeding, infection, weakness, numbness, paralysis, CSF leak requiring repair, no change or increase in pain or other symptoms, change in bowel/bladder/sexual function, need for decompression, revision, repeat or other procedure(s).  I have also discussed the possibility of the following:    Failure of fusion (pseudoarthrosis) or over-fusion requiring revision  Use of allografts/autografts/biologics/hardware  Removal or replacement of device(s)  Adjacent segment progression requiring treatment(s)  Extended hospital/rehab/skilled nursing facility stay  Need for flat bedrest  Use of bracing/collar use for an extended period of time    I have also discussed the alternatives to the procedure as well as options for no treatment and/or expected courses for all.  I also discussed the role of any MD/PA first assistant and the patient assents to their participation.  All questions were answered and the patient wishes to proceed.

## 2018-05-22 ENCOUNTER — FORM ENCOUNTER (OUTPATIENT)
Age: 48
End: 2018-05-22

## 2018-05-22 ENCOUNTER — OUTPATIENT (OUTPATIENT)
Dept: OUTPATIENT SERVICES | Facility: HOSPITAL | Age: 48
LOS: 1 days | Discharge: HOME | End: 2018-05-22

## 2018-05-22 ENCOUNTER — APPOINTMENT (OUTPATIENT)
Dept: INTERNAL MEDICINE | Facility: CLINIC | Age: 48
End: 2018-05-22

## 2018-05-22 VITALS
SYSTOLIC BLOOD PRESSURE: 129 MMHG | HEIGHT: 60 IN | WEIGHT: 151 LBS | DIASTOLIC BLOOD PRESSURE: 82 MMHG | BODY MASS INDEX: 29.64 KG/M2 | HEART RATE: 69 BPM

## 2018-05-22 DIAGNOSIS — Z98.890 OTHER SPECIFIED POSTPROCEDURAL STATES: Chronic | ICD-10-CM

## 2018-05-22 DIAGNOSIS — Z01.818 ENCOUNTER FOR OTHER PREPROCEDURAL EXAMINATION: ICD-10-CM

## 2018-05-22 NOTE — HISTORY OF PRESENT ILLNESS
[de-identified] : 48 F presents for routine follow up of lab tests . She reports that she has chronic back pain , 2/2 ? herniated disc(unsure) and is scheduled for surgery soon. \par She states that she has also been forgetful more than usual and needs neurology referral for evaluation. Also reports upper epigastric pain, sharp to burning in nature, comes and goes , currently asymptomatic at this point.\par Denies other symptoms s/a fever/chills/cp/sob/urinary or bowel symptoms.

## 2018-05-22 NOTE — ASSESSMENT
[FreeTextEntry1] : 48 F presents for routine follow up of lab tests . She reports chronic back pain /upper abdominal pain and increased forgetfulness\par \par #chronic back pain \par -pt follows with pain management, and get fentanyl patches\par -continue gabapentin, tizanidine\par - pt being scheduled for OR by neurosurgery\par - patient with no cardiopulmonary illness, stable for OR \par -all labs normal needs CXR\par \par #forgetfulness\par neurology eval\par \par #depression\par -controlled with trazodone and escitalopram\par \par \par #GERD\par omeprazole dose increased to 40\par GI eval for intermittent epigastric pain\par \par #HCM\par Labs reviewed \par HPV neg 10/2017, mammo birads 2 9/2017\par RTC in 3 months

## 2018-05-22 NOTE — PHYSICAL EXAM
[General Appearance - Alert] : alert [General Appearance - In No Acute Distress] : in no acute distress [Thyroid Diffuse Enlargement] : the thyroid was not enlarged [Auscultation Breath Sounds / Voice Sounds] : lungs were clear to auscultation bilaterally [Heart Rate And Rhythm] : heart rate was normal and rhythm regular [Heart Sounds] : normal S1 and S2 [Heart Sounds Gallop] : no gallops [Murmurs] : no murmurs [Heart Sounds Pericardial Friction Rub] : no pericardial rub [Full Pulse] : the pedal pulses are present [Edema] : there was no peripheral edema [Bowel Sounds] : normal bowel sounds [Abdomen Soft] : soft [Abdomen Tenderness] : non-tender [] : no hepato-splenomegaly [Abdomen Mass (___ Cm)] : no abdominal mass palpated [FreeTextEntry1] : lower neck anterior soft flexible mass, near surgical scar

## 2018-05-23 ENCOUNTER — OUTPATIENT (OUTPATIENT)
Dept: OUTPATIENT SERVICES | Facility: HOSPITAL | Age: 48
LOS: 1 days | Discharge: HOME | End: 2018-05-23

## 2018-05-23 DIAGNOSIS — Z98.890 OTHER SPECIFIED POSTPROCEDURAL STATES: Chronic | ICD-10-CM

## 2018-05-23 DIAGNOSIS — Z01.818 ENCOUNTER FOR OTHER PREPROCEDURAL EXAMINATION: ICD-10-CM

## 2018-05-25 ENCOUNTER — APPOINTMENT (OUTPATIENT)
Dept: INTERNAL MEDICINE | Facility: CLINIC | Age: 48
End: 2018-05-25

## 2018-05-30 ENCOUNTER — INPATIENT (INPATIENT)
Facility: HOSPITAL | Age: 48
LOS: 7 days | Discharge: HOME | End: 2018-06-07
Attending: NEUROLOGICAL SURGERY | Admitting: NEUROLOGICAL SURGERY

## 2018-05-30 ENCOUNTER — RESULT REVIEW (OUTPATIENT)
Age: 48
End: 2018-05-30

## 2018-05-30 VITALS
DIASTOLIC BLOOD PRESSURE: 94 MMHG | RESPIRATION RATE: 20 BRPM | HEART RATE: 62 BPM | WEIGHT: 160.06 LBS | TEMPERATURE: 98 F | SYSTOLIC BLOOD PRESSURE: 142 MMHG

## 2018-05-30 DIAGNOSIS — Z98.890 OTHER SPECIFIED POSTPROCEDURAL STATES: Chronic | ICD-10-CM

## 2018-05-30 RX ORDER — CEFAZOLIN SODIUM 1 G
1000 VIAL (EA) INJECTION EVERY 8 HOURS
Qty: 0 | Refills: 0 | Status: COMPLETED | OUTPATIENT
Start: 2018-05-30 | End: 2018-05-31

## 2018-05-30 RX ORDER — FENTANYL CITRATE 50 UG/ML
1 INJECTION INTRAVENOUS
Qty: 0 | Refills: 0 | Status: DISCONTINUED | OUTPATIENT
Start: 2018-05-30 | End: 2018-06-05

## 2018-05-30 RX ORDER — PANTOPRAZOLE SODIUM 20 MG/1
40 TABLET, DELAYED RELEASE ORAL
Qty: 0 | Refills: 0 | Status: DISCONTINUED | OUTPATIENT
Start: 2018-05-30 | End: 2018-06-07

## 2018-05-30 RX ORDER — DIAZEPAM 5 MG
5 TABLET ORAL EVERY 8 HOURS
Qty: 0 | Refills: 0 | Status: DISCONTINUED | OUTPATIENT
Start: 2018-05-30 | End: 2018-06-01

## 2018-05-30 RX ORDER — ACETAMINOPHEN 500 MG
325 TABLET ORAL EVERY 4 HOURS
Qty: 0 | Refills: 0 | Status: DISCONTINUED | OUTPATIENT
Start: 2018-05-30 | End: 2018-06-07

## 2018-05-30 RX ORDER — ONDANSETRON 8 MG/1
4 TABLET, FILM COATED ORAL EVERY 6 HOURS
Qty: 0 | Refills: 0 | Status: DISCONTINUED | OUTPATIENT
Start: 2018-05-30 | End: 2018-06-07

## 2018-05-30 RX ORDER — MORPHINE SULFATE 50 MG/1
2 CAPSULE, EXTENDED RELEASE ORAL EVERY 4 HOURS
Qty: 0 | Refills: 0 | Status: DISCONTINUED | OUTPATIENT
Start: 2018-05-30 | End: 2018-05-31

## 2018-05-30 RX ORDER — DIPHENHYDRAMINE HCL 50 MG
25 CAPSULE ORAL EVERY 6 HOURS
Qty: 0 | Refills: 0 | Status: DISCONTINUED | OUTPATIENT
Start: 2018-05-30 | End: 2018-06-07

## 2018-05-30 RX ORDER — DIPHENHYDRAMINE HCL 50 MG
12.5 CAPSULE ORAL EVERY 4 HOURS
Qty: 0 | Refills: 0 | Status: DISCONTINUED | OUTPATIENT
Start: 2018-05-30 | End: 2018-05-30

## 2018-05-30 RX ORDER — SENNA PLUS 8.6 MG/1
2 TABLET ORAL AT BEDTIME
Qty: 0 | Refills: 0 | Status: DISCONTINUED | OUTPATIENT
Start: 2018-05-30 | End: 2018-06-07

## 2018-05-30 RX ORDER — HYDROMORPHONE HYDROCHLORIDE 2 MG/ML
1 INJECTION INTRAMUSCULAR; INTRAVENOUS; SUBCUTANEOUS
Qty: 0 | Refills: 0 | Status: DISCONTINUED | OUTPATIENT
Start: 2018-05-30 | End: 2018-05-30

## 2018-05-30 RX ORDER — SODIUM CHLORIDE 9 MG/ML
1000 INJECTION INTRAMUSCULAR; INTRAVENOUS; SUBCUTANEOUS
Qty: 0 | Refills: 0 | Status: DISCONTINUED | OUTPATIENT
Start: 2018-05-30 | End: 2018-06-04

## 2018-05-30 RX ORDER — DOCUSATE SODIUM 100 MG
100 CAPSULE ORAL THREE TIMES A DAY
Qty: 0 | Refills: 0 | Status: DISCONTINUED | OUTPATIENT
Start: 2018-05-30 | End: 2018-06-07

## 2018-05-30 RX ORDER — SODIUM CHLORIDE 9 MG/ML
1000 INJECTION, SOLUTION INTRAVENOUS
Qty: 0 | Refills: 0 | Status: DISCONTINUED | OUTPATIENT
Start: 2018-05-30 | End: 2018-05-31

## 2018-05-30 RX ORDER — MONTELUKAST 4 MG/1
10 TABLET, CHEWABLE ORAL DAILY
Qty: 0 | Refills: 0 | Status: DISCONTINUED | OUTPATIENT
Start: 2018-05-30 | End: 2018-06-07

## 2018-05-30 RX ORDER — NALOXONE HYDROCHLORIDE 4 MG/.1ML
0.1 SPRAY NASAL
Qty: 0 | Refills: 0 | Status: DISCONTINUED | OUTPATIENT
Start: 2018-05-30 | End: 2018-06-07

## 2018-05-30 RX ORDER — OXYCODONE AND ACETAMINOPHEN 5; 325 MG/1; MG/1
2 TABLET ORAL EVERY 6 HOURS
Qty: 0 | Refills: 0 | Status: DISCONTINUED | OUTPATIENT
Start: 2018-05-30 | End: 2018-05-31

## 2018-05-30 RX ORDER — HYDROMORPHONE HYDROCHLORIDE 2 MG/ML
0.5 INJECTION INTRAMUSCULAR; INTRAVENOUS; SUBCUTANEOUS
Qty: 0 | Refills: 0 | Status: DISCONTINUED | OUTPATIENT
Start: 2018-05-30 | End: 2018-05-31

## 2018-05-30 RX ORDER — HYDROMORPHONE HYDROCHLORIDE 2 MG/ML
30 INJECTION INTRAMUSCULAR; INTRAVENOUS; SUBCUTANEOUS
Qty: 0 | Refills: 0 | Status: DISCONTINUED | OUTPATIENT
Start: 2018-05-30 | End: 2018-05-31

## 2018-05-30 RX ORDER — DIPHENHYDRAMINE HCL 50 MG
12.5 CAPSULE ORAL ONCE
Qty: 0 | Refills: 0 | Status: COMPLETED | OUTPATIENT
Start: 2018-05-30 | End: 2018-05-30

## 2018-05-30 RX ORDER — OXYCODONE AND ACETAMINOPHEN 5; 325 MG/1; MG/1
2 TABLET ORAL EVERY 4 HOURS
Qty: 0 | Refills: 0 | Status: DISCONTINUED | OUTPATIENT
Start: 2018-05-30 | End: 2018-05-31

## 2018-05-30 RX ORDER — TRAZODONE HCL 50 MG
50 TABLET ORAL DAILY
Qty: 0 | Refills: 0 | Status: DISCONTINUED | OUTPATIENT
Start: 2018-05-30 | End: 2018-06-07

## 2018-05-30 RX ORDER — ONDANSETRON 8 MG/1
4 TABLET, FILM COATED ORAL ONCE
Qty: 0 | Refills: 0 | Status: DISCONTINUED | OUTPATIENT
Start: 2018-05-30 | End: 2018-05-31

## 2018-05-30 RX ORDER — ALBUTEROL 90 UG/1
2 AEROSOL, METERED ORAL EVERY 6 HOURS
Qty: 0 | Refills: 0 | Status: DISCONTINUED | OUTPATIENT
Start: 2018-05-30 | End: 2018-06-07

## 2018-05-30 RX ORDER — GABAPENTIN 400 MG/1
600 CAPSULE ORAL THREE TIMES A DAY
Qty: 0 | Refills: 0 | Status: DISCONTINUED | OUTPATIENT
Start: 2018-05-30 | End: 2018-06-07

## 2018-05-30 RX ORDER — ESCITALOPRAM OXALATE 10 MG/1
20 TABLET, FILM COATED ORAL DAILY
Qty: 0 | Refills: 0 | Status: DISCONTINUED | OUTPATIENT
Start: 2018-05-30 | End: 2018-06-07

## 2018-05-30 RX ADMIN — HYDROMORPHONE HYDROCHLORIDE 1 MILLIGRAM(S): 2 INJECTION INTRAMUSCULAR; INTRAVENOUS; SUBCUTANEOUS at 10:58

## 2018-05-30 RX ADMIN — HYDROMORPHONE HYDROCHLORIDE 1 MILLIGRAM(S): 2 INJECTION INTRAMUSCULAR; INTRAVENOUS; SUBCUTANEOUS at 11:35

## 2018-05-30 RX ADMIN — SODIUM CHLORIDE 50 MILLILITER(S): 9 INJECTION INTRAMUSCULAR; INTRAVENOUS; SUBCUTANEOUS at 11:53

## 2018-05-30 RX ADMIN — Medication 100 MILLIGRAM(S): at 14:19

## 2018-05-30 RX ADMIN — GABAPENTIN 600 MILLIGRAM(S): 400 CAPSULE ORAL at 22:43

## 2018-05-30 RX ADMIN — FENTANYL CITRATE 1 PATCH: 50 INJECTION INTRAVENOUS at 16:43

## 2018-05-30 RX ADMIN — SODIUM CHLORIDE 100 MILLILITER(S): 9 INJECTION, SOLUTION INTRAVENOUS at 10:50

## 2018-05-30 RX ADMIN — GABAPENTIN 600 MILLIGRAM(S): 400 CAPSULE ORAL at 14:14

## 2018-05-30 RX ADMIN — HYDROMORPHONE HYDROCHLORIDE 1 MILLIGRAM(S): 2 INJECTION INTRAMUSCULAR; INTRAVENOUS; SUBCUTANEOUS at 12:09

## 2018-05-30 RX ADMIN — PANTOPRAZOLE SODIUM 40 MILLIGRAM(S): 20 TABLET, DELAYED RELEASE ORAL at 23:11

## 2018-05-30 RX ADMIN — Medication 5 MILLIGRAM(S): at 14:15

## 2018-05-30 RX ADMIN — Medication 12.5 MILLIGRAM(S): at 17:32

## 2018-05-30 RX ADMIN — HYDROMORPHONE HYDROCHLORIDE 1 MILLIGRAM(S): 2 INJECTION INTRAMUSCULAR; INTRAVENOUS; SUBCUTANEOUS at 11:50

## 2018-05-30 RX ADMIN — ONDANSETRON 4 MILLIGRAM(S): 8 TABLET, FILM COATED ORAL at 23:26

## 2018-05-30 RX ADMIN — Medication 5 MILLIGRAM(S): at 22:39

## 2018-05-30 RX ADMIN — Medication 100 MILLIGRAM(S): at 22:42

## 2018-05-30 RX ADMIN — HYDROMORPHONE HYDROCHLORIDE 30 MILLILITER(S): 2 INJECTION INTRAMUSCULAR; INTRAVENOUS; SUBCUTANEOUS at 11:56

## 2018-05-30 RX ADMIN — Medication 1 TABLET(S): at 13:24

## 2018-05-30 RX ADMIN — Medication 100 MILLIGRAM(S): at 22:38

## 2018-05-30 RX ADMIN — HYDROMORPHONE HYDROCHLORIDE 1 MILLIGRAM(S): 2 INJECTION INTRAMUSCULAR; INTRAVENOUS; SUBCUTANEOUS at 10:55

## 2018-05-30 RX ADMIN — HYDROMORPHONE HYDROCHLORIDE 1 MILLIGRAM(S): 2 INJECTION INTRAMUSCULAR; INTRAVENOUS; SUBCUTANEOUS at 11:30

## 2018-05-30 RX ADMIN — HYDROMORPHONE HYDROCHLORIDE 1 MILLIGRAM(S): 2 INJECTION INTRAMUSCULAR; INTRAVENOUS; SUBCUTANEOUS at 10:45

## 2018-05-30 NOTE — CHART NOTE - NSCHARTNOTEFT_GEN_A_CORE
PACU ANESTHESIA ADMISSION NOTE      Procedure: Transforaminal lumbar interbody fusion (TLIF) at one level: L5-S1    Post op diagnosis:  Lumbar degenerative disc disease      ____  Intubated  TV:______       Rate: ______      FiO2: ______    ____  Patent Airway    ____  Full return of protective reflexes    _x___  Full recovery from anesthesia / back to baseline     Vitals:   T:97.4          R:  10                BP: 104/59                 Sat:100                   P: 74      Mental Status:  __x__ Awake   __x___ Alert   _____ Drowsy   _____ Sedated    Nausea/Vomiting:  _x___ NO  ______Yes,   See Post - Op Orders          Pain Scale (0-10):  _____    Treatment: ____ None    __x__ See Post - Op/PCA Orders    Post - Operative Fluids:   ____ Oral   __x__ See Post - Op Orders    Plan: Discharge:   ____Home       ___x__Floor     _____Critical Care    _____  Other:_fully alert and awake moving all four extremities________________    Comments:

## 2018-05-30 NOTE — BRIEF OPERATIVE NOTE - PROCEDURE
<<-----Click on this checkbox to enter Procedure Transforaminal lumbar interbody fusion (TLIF) at one level  05/30/2018  L5-S1  Active  [id number]

## 2018-05-30 NOTE — CHART NOTE - NSCHARTNOTEFT_GEN_A_CORE
05-30-18 @ 07:09  BRANDYN RIOS  [id number]  48yFemale    I have discussed the risks and benefits of TLIF with the patient including but not limited to bleeding, infection, weakness, numbness, paralysis, CSF leak requiring repair, no change or increase in pain or other symptoms, change in bowel/bladder/sexual function, need for decompression, revision, repeat or other procedure(s).  I have also discussed the possibility of the following:    Failure of fusion (pseudoarthrosis) or over-fusion requiring revision  Use of allografts/autografts/biologics/hardware  Removal or replacement of device(s)  Adjacent segment progression requiring treatment(s)  Extended hospital/rehab/skilled nursing facility stay  Need for flat bedrest  Use of bracing/collar use for an extended period of time    I have also discussed the alternatives to the procedure as well as options for no treatment and/or expected courses for all.  I also discussed the role of any MD/PA first assistant and the patient  assents to their participation.  All questions were answered and the patient wishes to proceed.

## 2018-05-31 LAB — SURGICAL PATHOLOGY STUDY: SIGNIFICANT CHANGE UP

## 2018-05-31 RX ORDER — HEPARIN SODIUM 5000 [USP'U]/ML
5000 INJECTION INTRAVENOUS; SUBCUTANEOUS EVERY 8 HOURS
Qty: 0 | Refills: 0 | Status: DISCONTINUED | OUTPATIENT
Start: 2018-05-31 | End: 2018-06-07

## 2018-05-31 RX ORDER — HYDROMORPHONE HYDROCHLORIDE 2 MG/ML
0.5 INJECTION INTRAMUSCULAR; INTRAVENOUS; SUBCUTANEOUS EVERY 4 HOURS
Qty: 0 | Refills: 0 | Status: DISCONTINUED | OUTPATIENT
Start: 2018-05-31 | End: 2018-06-01

## 2018-05-31 RX ORDER — HYDROMORPHONE HYDROCHLORIDE 2 MG/ML
1 INJECTION INTRAMUSCULAR; INTRAVENOUS; SUBCUTANEOUS EVERY 4 HOURS
Qty: 0 | Refills: 0 | Status: DISCONTINUED | OUTPATIENT
Start: 2018-05-31 | End: 2018-05-31

## 2018-05-31 RX ORDER — OXYCODONE AND ACETAMINOPHEN 5; 325 MG/1; MG/1
2 TABLET ORAL EVERY 4 HOURS
Qty: 0 | Refills: 0 | Status: DISCONTINUED | OUTPATIENT
Start: 2018-05-31 | End: 2018-06-07

## 2018-05-31 RX ADMIN — Medication 1 TABLET(S): at 13:47

## 2018-05-31 RX ADMIN — MONTELUKAST 10 MILLIGRAM(S): 4 TABLET, CHEWABLE ORAL at 13:47

## 2018-05-31 RX ADMIN — HEPARIN SODIUM 5000 UNIT(S): 5000 INJECTION INTRAVENOUS; SUBCUTANEOUS at 21:30

## 2018-05-31 RX ADMIN — Medication 25 MILLIGRAM(S): at 06:20

## 2018-05-31 RX ADMIN — Medication 100 MILLIGRAM(S): at 14:26

## 2018-05-31 RX ADMIN — HYDROMORPHONE HYDROCHLORIDE 0.5 MILLIGRAM(S): 2 INJECTION INTRAMUSCULAR; INTRAVENOUS; SUBCUTANEOUS at 22:28

## 2018-05-31 RX ADMIN — Medication 100 MILLIGRAM(S): at 05:35

## 2018-05-31 RX ADMIN — PANTOPRAZOLE SODIUM 40 MILLIGRAM(S): 20 TABLET, DELAYED RELEASE ORAL at 05:35

## 2018-05-31 RX ADMIN — GABAPENTIN 600 MILLIGRAM(S): 400 CAPSULE ORAL at 14:27

## 2018-05-31 RX ADMIN — ALBUTEROL 2 PUFF(S): 90 AEROSOL, METERED ORAL at 09:24

## 2018-05-31 RX ADMIN — Medication 50 MILLIGRAM(S): at 13:48

## 2018-05-31 RX ADMIN — OXYCODONE AND ACETAMINOPHEN 2 TABLET(S): 5; 325 TABLET ORAL at 13:17

## 2018-05-31 RX ADMIN — HEPARIN SODIUM 5000 UNIT(S): 5000 INJECTION INTRAVENOUS; SUBCUTANEOUS at 14:27

## 2018-05-31 RX ADMIN — OXYCODONE AND ACETAMINOPHEN 2 TABLET(S): 5; 325 TABLET ORAL at 14:15

## 2018-05-31 RX ADMIN — GABAPENTIN 600 MILLIGRAM(S): 400 CAPSULE ORAL at 05:34

## 2018-05-31 RX ADMIN — Medication 5 MILLIGRAM(S): at 21:30

## 2018-05-31 RX ADMIN — Medication 5 MILLIGRAM(S): at 05:35

## 2018-05-31 RX ADMIN — GABAPENTIN 600 MILLIGRAM(S): 400 CAPSULE ORAL at 21:29

## 2018-05-31 RX ADMIN — ESCITALOPRAM OXALATE 20 MILLIGRAM(S): 10 TABLET, FILM COATED ORAL at 13:05

## 2018-05-31 RX ADMIN — Medication 5 MILLIGRAM(S): at 14:26

## 2018-05-31 RX ADMIN — Medication 100 MILLIGRAM(S): at 21:29

## 2018-05-31 NOTE — PROGRESS NOTE ADULT - ATTENDING COMMENTS
The patient was seen and examined on afternoon rounds.  She admitted to stacy-incisional pain.  She denied any new numbness, tingling, or weakness in the lower extremities.    She demonstrated stable strength in lower extremities in all muscle groups.  PLAN: Continue with pain control, continue with mobilization out of bed with physical therapy, DVT prophylaxis with subcutaneous heparin.

## 2018-05-31 NOTE — CONSULT NOTE ADULT - SUBJECTIVE AND OBJECTIVE BOX
HPI:      PAST MEDICAL & SURGICAL HISTORY:  History of cholecystectomy  Depression, unspecified depression type  PTSD (post-traumatic stress disorder)  Anxiety  Gastroesophageal reflux disease, esophagitis presence not specified  Chronic back pain, unspecified back location, unspecified back pain laterality  Asthma, unspecified asthma severity, unspecified whether complicated, unspecified whether persistent  H/O neck surgery  History of cholecystectomy      Hospital Course:  Lumbar degenerative disc disease s/p transforaminal lumbar interbody fusion (TLIF) at one level L5-S1 05/30/2018 by Dr. Alie YING'S SUBJECTIVE & REVIEW OF SYMPTOMS:     Constitutional WNL   Cardio WNL   Resp WNL   GI WNL  Heme WNL  Endo WNL  Skin WNL  MSK WNL  Neuro WNL  Cognitive WNL  Psych WNL      MEDICATIONS  (STANDING):  diazepam    Tablet 5 milliGRAM(s) Oral every 8 hours  docusate sodium 100 milliGRAM(s) Oral three times a day  escitalopram 20 milliGRAM(s) Oral daily  fentaNYL   Patch  25 MICROgram(s)/Hr 1 Patch Transdermal every 72 hours  gabapentin 600 milliGRAM(s) Oral three times a day  HYDROmorphone PCA (1 mG/mL) 30 milliLiter(s) PCA Continuous PCA Continuous  montelukast 10 milliGRAM(s) Oral daily  multivitamin 1 Tablet(s) Oral daily  pantoprazole    Tablet 40 milliGRAM(s) Oral before breakfast  sodium chloride 0.9%. 1000 milliLiter(s) (50 mL/Hr) IV Continuous <Continuous>  traZODone 50 milliGRAM(s) Oral daily    MEDICATIONS  (PRN):  acetaminophen   Tablet. 325 milliGRAM(s) Oral every 4 hours PRN Mild Pain (1 - 3)  ALBUTerol    90 MICROgram(s) HFA Inhaler 2 Puff(s) Inhalation every 6 hours PRN Wheezing  bisacodyl 5 milliGRAM(s) Oral daily PRN Constipation  bisacodyl Suppository 10 milliGRAM(s) Rectal once PRN Constipation  diphenhydrAMINE   Capsule 25 milliGRAM(s) Oral every 6 hours PRN Rash and/or Itching  morphine  - Injectable 2 milliGRAM(s) IV Push every 4 hours PRN Severe Pain (7 - 10)  naloxone Injectable 0.1 milliGRAM(s) IV Push every 3 minutes PRN For ANY of the following changes in patient status:  A. RR LESS THAN 10 breaths per minute, B. Oxygen saturation LESS THAN 90%, C. Sedation score of 6  ondansetron Injectable 4 milliGRAM(s) IV Push every 6 hours PRN Nausea  ondansetron Injectable 4 milliGRAM(s) IV Push every 6 hours PRN Nausea and/or Vomiting  oxyCODONE    5 mG/acetaminophen 325 mG 2 Tablet(s) Oral every 4 hours PRN Moderate Pain (4 - 6)  senna 2 Tablet(s) Oral at bedtime PRN Constipation      FAMILY HISTORY:      Allergies    No Known Allergies    Intolerances        SOCIAL HISTORY:    [  ] EtOH  [  ] Smoking  [  ] Substance abuse     Home Environment:  [  ] Home Alone  [  ] Lives with Family  [  ] Home Health Aid    Dwelling:  [  ] Apartment  [  ] Private House  [  ] Adult Home  [  ] Skilled Nursing Facility      [  ] Short Term  [  ] Long Term  [  ] Stairs       Elevator [  ]    FUNCTIONAL STATUS PTA: (Check all that apply)  Ambulation: [   ]Independent    [  ] Dependent     [  ] Non-Ambulatory  Assistive Device: [  ] SA Cane  [  ]  Q Cane  [  ] Walker  [  ]  Wheelchair  ADL : [  ] Independent  [  ]  Dependent       Vital Signs Last 24 Hrs  T(C): 36.3 (31 May 2018 07:48), Max: 37 (30 May 2018 23:30)  T(F): 97.4 (31 May 2018 07:48), Max: 98.6 (30 May 2018 23:30)  HR: 75 (31 May 2018 07:48) (68 - 87)  BP: 109/57 (31 May 2018 07:48) (84/59 - 142/77)  BP(mean): 94 (30 May 2018 21:00) (94 - 102)  RR: 19 (31 May 2018 00:35) (7 - 28)  SpO2: 98% (31 May 2018 00:35) (95% - 100%)      PHYSICAL EXAM: Alert & Oriented X3  GENERAL: NAD, well-groomed, well-developed  HEAD:  Atraumatic, Normocephalic  EYES: EOMI, PERRLA, conjunctiva and sclera clear  NECK: Supple, No JVD, Normal thyroid  CHEST/LUNG: Clear to percussion bilaterally; No rales, rhonchi, wheezing, or rubs  HEART: Regular rate and rhythm; No murmurs, rubs, or gallops  ABDOMEN: Soft, Nontender, Nondistended; Bowel sounds present  EXTREMITIES:  2+ Peripheral Pulses, No clubbing, cyanosis, or edema    NERVOUS SYSTEM:  Cranial Nerves 2-12 intact [  ] Abnormal  [  ]  ROM: WFL all extremities [  ]  Abnormal [  ]  Motor Strength: WFL all extremities  [  ]  Abnormal [  ]  Sensation: intact to light touch [  ] Abnormal [  ]  Reflexes: Symmetric [  ]  Abnormal [  ]    FUNCTIONAL STATUS:  Bed Mobility: Independent [  ]  Supervision [  ]  Needs Assistance [  ]  N/A [  ]  Transfers: Independent [  ]  Supervision [  ]  Needs Assistance [  ]  N/A [  ]   Ambulation: Independent [  ]  Supervision [  ]  Needs Assistance [  ]  N/A [  ]  ADL: Independent [  ] Requires Assistance [  ] N/A [  ]      LABS:                RADIOLOGY & ADDITIONAL STUDIES: HPI:  48 y.o. right-handed woman with lumbar DDD admitted 5/30/2018 for an L5-S1 TLIF      PAST MEDICAL & SURGICAL HISTORY:  History of cholecystectomy  Depression, unspecified depression type  PTSD (post-traumatic stress disorder)  Anxiety  Gastroesophageal reflux disease, esophagitis presence not specified  Chronic back pain, unspecified back location, unspecified back pain laterality  Asthma, unspecified asthma severity, unspecified whether complicated, unspecified whether persistent  H/O neck surgery  History of cholecystectomy      Hospital Course:  Lumbar degenerative disc disease s/p transforaminal lumbar interbody fusion (TLIF) at one level L5-S1 05/30/2018 by Dr. Alie YING'S SUBJECTIVE & REVIEW OF SYMPTOMS:     Constitutional WNL   Cardio WNL   Resp WNL   GI WNL  Heme WNL  Endo WNL  Skin WNL  MSK + lower back pain and leg pain  Neuro + R>LLE numbness  Cognitive WNL  Psych WNL      MEDICATIONS  (STANDING):  diazepam    Tablet 5 milliGRAM(s) Oral every 8 hours  docusate sodium 100 milliGRAM(s) Oral three times a day  escitalopram 20 milliGRAM(s) Oral daily  fentaNYL   Patch  25 MICROgram(s)/Hr 1 Patch Transdermal every 72 hours  gabapentin 600 milliGRAM(s) Oral three times a day  HYDROmorphone PCA (1 mG/mL) 30 milliLiter(s) PCA Continuous PCA Continuous  montelukast 10 milliGRAM(s) Oral daily  multivitamin 1 Tablet(s) Oral daily  pantoprazole    Tablet 40 milliGRAM(s) Oral before breakfast  sodium chloride 0.9%. 1000 milliLiter(s) (50 mL/Hr) IV Continuous <Continuous>  traZODone 50 milliGRAM(s) Oral daily    MEDICATIONS  (PRN):  acetaminophen   Tablet. 325 milliGRAM(s) Oral every 4 hours PRN Mild Pain (1 - 3)  ALBUTerol    90 MICROgram(s) HFA Inhaler 2 Puff(s) Inhalation every 6 hours PRN Wheezing  bisacodyl 5 milliGRAM(s) Oral daily PRN Constipation  bisacodyl Suppository 10 milliGRAM(s) Rectal once PRN Constipation  diphenhydrAMINE   Capsule 25 milliGRAM(s) Oral every 6 hours PRN Rash and/or Itching  morphine  - Injectable 2 milliGRAM(s) IV Push every 4 hours PRN Severe Pain (7 - 10)  naloxone Injectable 0.1 milliGRAM(s) IV Push every 3 minutes PRN For ANY of the following changes in patient status:  A. RR LESS THAN 10 breaths per minute, B. Oxygen saturation LESS THAN 90%, C. Sedation score of 6  ondansetron Injectable 4 milliGRAM(s) IV Push every 6 hours PRN Nausea  ondansetron Injectable 4 milliGRAM(s) IV Push every 6 hours PRN Nausea and/or Vomiting  oxyCODONE    5 mG/acetaminophen 325 mG 2 Tablet(s) Oral every 4 hours PRN Moderate Pain (4 - 6)  senna 2 Tablet(s) Oral at bedtime PRN Constipation      FAMILY HISTORY:  N/C      Allergies    No Known Allergies    Intolerances        SOCIAL HISTORY:    [  ] EtOH  [  ] Smoking  [  ] Substance abuse     Home Environment:  [  ] Home Alone  [ X ] Lives with Family  [ X ] Home Health Aid--4h x 5 days    Dwelling:  [ X ] Apartment  [  ] Private House  [  ] Adult Home  [  ] Skilled Nursing Facility      [  ] Short Term  [  ] Long Term  [  ] Stairs       Elevator [ X ]    FUNCTIONAL STATUS PTA: (Check all that apply)  Ambulation: [  X ] assistance    [  ] Dependent     [  ] Non-Ambulatory  Assistive Device: [  ] SA Cane  [ X ]  Q Cane  [  ] Walker  [  ]  Wheelchair  ADL : [X  ] assistance [  ]  Dependent       Vital Signs Last 24 Hrs  T(C): 36.3 (31 May 2018 07:48), Max: 37 (30 May 2018 23:30)  T(F): 97.4 (31 May 2018 07:48), Max: 98.6 (30 May 2018 23:30)  HR: 75 (31 May 2018 07:48) (68 - 87)  BP: 109/57 (31 May 2018 07:48) (84/59 - 142/77)  BP(mean): 94 (30 May 2018 21:00) (94 - 102)  RR: 19 (31 May 2018 00:35) (7 - 28)  SpO2: 98% (31 May 2018 00:35) (95% - 100%)      PHYSICAL EXAM: Alert & Oriented X 3  GENERAL: NAD, well-groomed, well-developed  HEAD:  Atraumatic, Normocephalic  EYES: EOMI, PERRLA, conjunctiva and sclera clear  NECK: Supple, No JVD, Normal thyroid  CHEST/LUNG: Clear to percussion bilaterally; No rales, rhonchi, wheezing, or rubs  HEART: Regular rate and rhythm; No murmurs, rubs, or gallops  ABDOMEN: Soft, Nontender, Nondistended; Bowel sounds present  EXTREMITIES:  2+ Peripheral Pulses, No clubbing, cyanosis, or edema    NERVOUS SYSTEM:  Cranial Nerves 2-12 intact [  ] Abnormal  [  ]  ROM: WFL all extremities [  ]  Abnormal [  ]  Motor Strength: WFL all extremities  [  ]  Abnormal [  ]  Sensation: intact to light touch [  ] Abnormal [  ]  Reflexes: Symmetric [  ]  Abnormal [  ]    FUNCTIONAL STATUS:  Bed Mobility: Independent [  ]  Supervision [  ]  Needs Assistance [ X ]  N/A [  ]  Transfers: Independent [  ]  Supervision [  ]  Needs Assistance of 2 [X  ]  N/A [  ]   Ambulation: Independent [  ]  Supervision [  ]  Needs Assistance [  ]  N/A [ X ]  ADL: Independent [ X ] Requires Assistance [  ] N/A [  ]      LABS:                RADIOLOGY & ADDITIONAL STUDIES:    EXAM:  XR CHEST PA LAT 2V            PROCEDURE DATE:  05/23/2018            INTERPRETATION:  Clinical History / Reason for exam: Preoperative   evaluation.    Comparison : Chest radiograph 6/25/2015.    Technique/Positioning: Satisfactory.    Findings:    Support devices: None.    Cardiac/mediastinum/hilum: Unremarkable.    Lung parenchyma/Pleura: Within normal limits.    Skeleton/soft tissues: Degenerative changes of the thoracic spine.    Impression:      No radiographic evidence of acute cardiopulmonary disease.

## 2018-05-31 NOTE — CONSULT NOTE ADULT - ASSESSMENT
IMPRESSION: Rehab for lumbar degenerative disc disease s/p transforaminal lumbar interbody fusion (TLIF) at one level L5-S1 05/30/2018    PRECAUTIONS: [  ] Cardiac  [  ] Respiratory  [  ] Seizures [  ] Contact Isolation  [  ] Droplet Isolation  [  ] Other    Weight Bearing Status: WBAT    RECOMMENDATION:    Out of Bed to Chair     DVT/Decubiti Prophylaxis    REHAB PLAN:     [   ] Bedside P/T 3-5 times a week   [   ]   Bedside O/T  2-3 times a week             [   ] No Rehab Therapy Indicated                   [   ]  Speech Therapy   Conditioning/ROM                                    ADL  Bed Mobility                                               Conditioning/ROM  Transfers                                                     Bed Mobility  Sitting /Standing Balance                         Transfers                                        Gait Training                                               Sitting/Standing Balance  Stair Training [   ]Applicable                    Home equipment Eval                                                                        Splinting  [   ] Only      GOALS:   ADL   [   ]   Independent                    Transfers  [   ] Independent                          Ambulation  [   ] Independent     [    ] With device                            [   ]  CG                                                         [   ]  CG                                                                  [   ] CG                            [    ] Min A                                                   [   ] Min A                                                              [   ] Min  A          DISCHARGE PLAN:   [   ]  Good candidate for Intensive Rehabilitation/Hospital based-[facility]                                             Will tolerate 3hrs Intensive Rehab Daily                                       [    ]  Short Term Rehab in Skilled Nursing Facility                                       [    ]  Home with Outpatient or  services                                         [    ]  Possible Candidate for Intensive Hospital based Rehab      Thank you. IMPRESSION: Rehab for lumbar degenerative disc disease s/p transforaminal lumbar interbody fusion (TLIF) at one level L5-S1 05/30/2018    PRECAUTIONS: [  ] Cardiac  [  ] Respiratory  [  ] Seizures [  ] Contact Isolation  [  ] Droplet Isolation  [  ] Other    Weight Bearing Status: WBAT BLEs    RECOMMENDATION:    Out of Bed to Chair     DVT/Decubiti Prophylaxis    REHAB PLAN:     [ X  ] Bedside P/T 3-5 times a week   [   ]   Bedside O/T  2-3 times a week             [   ] No Rehab Therapy Indicated                   [   ]  Speech Therapy   Conditioning/ROM                                    ADL  Bed Mobility                                               Conditioning/ROM  Transfers                                                     Bed Mobility  Sitting /Standing Balance                         Transfers                                        Gait Training                                               Sitting/Standing Balance  Stair Training [   ]Applicable                    Home equipment Eval                                                                        Splinting  [   ] Only      GOALS:   ADL   [   ]   Independent                    Transfers  [ X  ] Independent                          Ambulation  [ X  ] Independent     [  X  ] With device                            [   ]  CG                                                         [   ]  CG                                                                  [   ] CG                            [    ] Min A                                                   [   ] Min A                                                              [   ] Min  A          DISCHARGE PLAN:   [   ]  Good candidate for Intensive Rehabilitation/Hospital based-[facility]                                             Will tolerate 3hrs Intensive Rehab Daily                                       [  X  ]  Short Term Rehab in Skilled Nursing Facility                                       [  X  ]  Home with Outpatient or VN services                                         [  X  ]  Possible Candidate for Intensive Hospital based Rehab      Thank you.

## 2018-05-31 NOTE — PHYSICAL THERAPY INITIAL EVALUATION ADULT - GENERAL OBSERVATIONS, REHAB EVAL
9:35:10:05 Pt encountered semifowler in bed in NAD. + IV? PCA pump, + devries. Pt reports pain 8/10 on pain scale to low back

## 2018-05-31 NOTE — PHYSICAL THERAPY INITIAL EVALUATION ADULT - DIAGNOSIS, PT EVAL
Rehab for lumbar degenerative disc disease s/p transforaminal lumbar interbody fusion (TLIF) at one level L5-S1 05/30/2018

## 2018-06-01 RX ORDER — HYDROMORPHONE HYDROCHLORIDE 2 MG/ML
0.5 INJECTION INTRAMUSCULAR; INTRAVENOUS; SUBCUTANEOUS ONCE
Qty: 0 | Refills: 0 | Status: DISCONTINUED | OUTPATIENT
Start: 2018-06-01 | End: 2018-06-01

## 2018-06-01 RX ORDER — HYDROMORPHONE HYDROCHLORIDE 2 MG/ML
0.5 INJECTION INTRAMUSCULAR; INTRAVENOUS; SUBCUTANEOUS EVERY 4 HOURS
Qty: 0 | Refills: 0 | Status: DISCONTINUED | OUTPATIENT
Start: 2018-06-01 | End: 2018-06-04

## 2018-06-01 RX ADMIN — HYDROMORPHONE HYDROCHLORIDE 0.5 MILLIGRAM(S): 2 INJECTION INTRAMUSCULAR; INTRAVENOUS; SUBCUTANEOUS at 15:16

## 2018-06-01 RX ADMIN — HEPARIN SODIUM 5000 UNIT(S): 5000 INJECTION INTRAVENOUS; SUBCUTANEOUS at 21:05

## 2018-06-01 RX ADMIN — MONTELUKAST 10 MILLIGRAM(S): 4 TABLET, CHEWABLE ORAL at 11:11

## 2018-06-01 RX ADMIN — HYDROMORPHONE HYDROCHLORIDE 0.5 MILLIGRAM(S): 2 INJECTION INTRAMUSCULAR; INTRAVENOUS; SUBCUTANEOUS at 08:26

## 2018-06-01 RX ADMIN — OXYCODONE AND ACETAMINOPHEN 2 TABLET(S): 5; 325 TABLET ORAL at 07:40

## 2018-06-01 RX ADMIN — HYDROMORPHONE HYDROCHLORIDE 0.5 MILLIGRAM(S): 2 INJECTION INTRAMUSCULAR; INTRAVENOUS; SUBCUTANEOUS at 08:40

## 2018-06-01 RX ADMIN — ESCITALOPRAM OXALATE 20 MILLIGRAM(S): 10 TABLET, FILM COATED ORAL at 11:11

## 2018-06-01 RX ADMIN — HYDROMORPHONE HYDROCHLORIDE 0.5 MILLIGRAM(S): 2 INJECTION INTRAMUSCULAR; INTRAVENOUS; SUBCUTANEOUS at 15:30

## 2018-06-01 RX ADMIN — Medication 50 MILLIGRAM(S): at 11:12

## 2018-06-01 RX ADMIN — SENNA PLUS 2 TABLET(S): 8.6 TABLET ORAL at 21:05

## 2018-06-01 RX ADMIN — Medication 5 MILLIGRAM(S): at 05:02

## 2018-06-01 RX ADMIN — OXYCODONE AND ACETAMINOPHEN 2 TABLET(S): 5; 325 TABLET ORAL at 17:25

## 2018-06-01 RX ADMIN — GABAPENTIN 600 MILLIGRAM(S): 400 CAPSULE ORAL at 21:05

## 2018-06-01 RX ADMIN — Medication 100 MILLIGRAM(S): at 15:10

## 2018-06-01 RX ADMIN — HYDROMORPHONE HYDROCHLORIDE 0.5 MILLIGRAM(S): 2 INJECTION INTRAMUSCULAR; INTRAVENOUS; SUBCUTANEOUS at 05:03

## 2018-06-01 RX ADMIN — Medication 1 TABLET(S): at 11:11

## 2018-06-01 RX ADMIN — PANTOPRAZOLE SODIUM 40 MILLIGRAM(S): 20 TABLET, DELAYED RELEASE ORAL at 05:16

## 2018-06-01 RX ADMIN — Medication 100 MILLIGRAM(S): at 05:02

## 2018-06-01 RX ADMIN — OXYCODONE AND ACETAMINOPHEN 2 TABLET(S): 5; 325 TABLET ORAL at 00:00

## 2018-06-01 RX ADMIN — Medication 100 MILLIGRAM(S): at 21:05

## 2018-06-01 RX ADMIN — OXYCODONE AND ACETAMINOPHEN 2 TABLET(S): 5; 325 TABLET ORAL at 03:50

## 2018-06-01 RX ADMIN — OXYCODONE AND ACETAMINOPHEN 2 TABLET(S): 5; 325 TABLET ORAL at 18:25

## 2018-06-01 RX ADMIN — HYDROMORPHONE HYDROCHLORIDE 0.5 MILLIGRAM(S): 2 INJECTION INTRAMUSCULAR; INTRAVENOUS; SUBCUTANEOUS at 19:31

## 2018-06-01 RX ADMIN — GABAPENTIN 600 MILLIGRAM(S): 400 CAPSULE ORAL at 05:02

## 2018-06-01 RX ADMIN — HEPARIN SODIUM 5000 UNIT(S): 5000 INJECTION INTRAVENOUS; SUBCUTANEOUS at 05:02

## 2018-06-01 RX ADMIN — HEPARIN SODIUM 5000 UNIT(S): 5000 INJECTION INTRAVENOUS; SUBCUTANEOUS at 15:12

## 2018-06-01 RX ADMIN — GABAPENTIN 600 MILLIGRAM(S): 400 CAPSULE ORAL at 15:11

## 2018-06-01 RX ADMIN — HYDROMORPHONE HYDROCHLORIDE 0.5 MILLIGRAM(S): 2 INJECTION INTRAMUSCULAR; INTRAVENOUS; SUBCUTANEOUS at 20:18

## 2018-06-01 RX ADMIN — OXYCODONE AND ACETAMINOPHEN 2 TABLET(S): 5; 325 TABLET ORAL at 22:43

## 2018-06-01 RX ADMIN — HYDROMORPHONE HYDROCHLORIDE 0.5 MILLIGRAM(S): 2 INJECTION INTRAMUSCULAR; INTRAVENOUS; SUBCUTANEOUS at 23:42

## 2018-06-02 RX ADMIN — GABAPENTIN 600 MILLIGRAM(S): 400 CAPSULE ORAL at 13:39

## 2018-06-02 RX ADMIN — MONTELUKAST 10 MILLIGRAM(S): 4 TABLET, CHEWABLE ORAL at 11:53

## 2018-06-02 RX ADMIN — ESCITALOPRAM OXALATE 20 MILLIGRAM(S): 10 TABLET, FILM COATED ORAL at 11:53

## 2018-06-02 RX ADMIN — FENTANYL CITRATE 1 PATCH: 50 INJECTION INTRAVENOUS at 17:20

## 2018-06-02 RX ADMIN — Medication 50 MILLIGRAM(S): at 11:53

## 2018-06-02 RX ADMIN — HEPARIN SODIUM 5000 UNIT(S): 5000 INJECTION INTRAVENOUS; SUBCUTANEOUS at 21:34

## 2018-06-02 RX ADMIN — HYDROMORPHONE HYDROCHLORIDE 0.5 MILLIGRAM(S): 2 INJECTION INTRAMUSCULAR; INTRAVENOUS; SUBCUTANEOUS at 17:49

## 2018-06-02 RX ADMIN — HYDROMORPHONE HYDROCHLORIDE 0.5 MILLIGRAM(S): 2 INJECTION INTRAMUSCULAR; INTRAVENOUS; SUBCUTANEOUS at 06:00

## 2018-06-02 RX ADMIN — PANTOPRAZOLE SODIUM 40 MILLIGRAM(S): 20 TABLET, DELAYED RELEASE ORAL at 05:44

## 2018-06-02 RX ADMIN — Medication 100 MILLIGRAM(S): at 05:44

## 2018-06-02 RX ADMIN — HEPARIN SODIUM 5000 UNIT(S): 5000 INJECTION INTRAVENOUS; SUBCUTANEOUS at 05:44

## 2018-06-02 RX ADMIN — GABAPENTIN 600 MILLIGRAM(S): 400 CAPSULE ORAL at 21:33

## 2018-06-02 RX ADMIN — Medication 100 MILLIGRAM(S): at 13:39

## 2018-06-02 RX ADMIN — HYDROMORPHONE HYDROCHLORIDE 0.5 MILLIGRAM(S): 2 INJECTION INTRAMUSCULAR; INTRAVENOUS; SUBCUTANEOUS at 17:19

## 2018-06-02 RX ADMIN — HYDROMORPHONE HYDROCHLORIDE 0.5 MILLIGRAM(S): 2 INJECTION INTRAMUSCULAR; INTRAVENOUS; SUBCUTANEOUS at 12:23

## 2018-06-02 RX ADMIN — Medication 1 TABLET(S): at 11:53

## 2018-06-02 RX ADMIN — HYDROMORPHONE HYDROCHLORIDE 0.5 MILLIGRAM(S): 2 INJECTION INTRAMUSCULAR; INTRAVENOUS; SUBCUTANEOUS at 11:53

## 2018-06-02 RX ADMIN — FENTANYL CITRATE 1 PATCH: 50 INJECTION INTRAVENOUS at 17:14

## 2018-06-02 RX ADMIN — OXYCODONE AND ACETAMINOPHEN 2 TABLET(S): 5; 325 TABLET ORAL at 20:16

## 2018-06-02 RX ADMIN — Medication 100 MILLIGRAM(S): at 21:33

## 2018-06-02 RX ADMIN — HEPARIN SODIUM 5000 UNIT(S): 5000 INJECTION INTRAVENOUS; SUBCUTANEOUS at 13:39

## 2018-06-02 RX ADMIN — GABAPENTIN 600 MILLIGRAM(S): 400 CAPSULE ORAL at 05:44

## 2018-06-02 NOTE — PROGRESS NOTE ADULT - ATTENDING COMMENTS
The patient was seen on afternoon rounds.  Patient encouraged to get out of bed as tolerated.  Continue with pain control.

## 2018-06-03 RX ORDER — SODIUM CHLORIDE 9 MG/ML
500 INJECTION INTRAMUSCULAR; INTRAVENOUS; SUBCUTANEOUS ONCE
Qty: 0 | Refills: 0 | Status: COMPLETED | OUTPATIENT
Start: 2018-06-03 | End: 2018-06-04

## 2018-06-03 RX ORDER — SODIUM CHLORIDE 9 MG/ML
500 INJECTION INTRAMUSCULAR; INTRAVENOUS; SUBCUTANEOUS ONCE
Qty: 0 | Refills: 0 | Status: COMPLETED | OUTPATIENT
Start: 2018-06-03 | End: 2018-06-03

## 2018-06-03 RX ORDER — SODIUM CHLORIDE 9 MG/ML
500 INJECTION INTRAMUSCULAR; INTRAVENOUS; SUBCUTANEOUS
Qty: 0 | Refills: 0 | Status: DISCONTINUED | OUTPATIENT
Start: 2018-06-03 | End: 2018-06-04

## 2018-06-03 RX ORDER — SODIUM CHLORIDE 9 MG/ML
1000 INJECTION INTRAMUSCULAR; INTRAVENOUS; SUBCUTANEOUS
Qty: 0 | Refills: 0 | Status: DISCONTINUED | OUTPATIENT
Start: 2018-06-03 | End: 2018-06-07

## 2018-06-03 RX ADMIN — OXYCODONE AND ACETAMINOPHEN 2 TABLET(S): 5; 325 TABLET ORAL at 23:28

## 2018-06-03 RX ADMIN — Medication 100 MILLIGRAM(S): at 23:04

## 2018-06-03 RX ADMIN — Medication 1 TABLET(S): at 11:28

## 2018-06-03 RX ADMIN — GABAPENTIN 600 MILLIGRAM(S): 400 CAPSULE ORAL at 23:04

## 2018-06-03 RX ADMIN — HEPARIN SODIUM 5000 UNIT(S): 5000 INJECTION INTRAVENOUS; SUBCUTANEOUS at 14:05

## 2018-06-03 RX ADMIN — HYDROMORPHONE HYDROCHLORIDE 0.5 MILLIGRAM(S): 2 INJECTION INTRAMUSCULAR; INTRAVENOUS; SUBCUTANEOUS at 14:05

## 2018-06-03 RX ADMIN — GABAPENTIN 600 MILLIGRAM(S): 400 CAPSULE ORAL at 14:05

## 2018-06-03 RX ADMIN — HEPARIN SODIUM 5000 UNIT(S): 5000 INJECTION INTRAVENOUS; SUBCUTANEOUS at 23:02

## 2018-06-03 RX ADMIN — GABAPENTIN 600 MILLIGRAM(S): 400 CAPSULE ORAL at 06:24

## 2018-06-03 RX ADMIN — ESCITALOPRAM OXALATE 20 MILLIGRAM(S): 10 TABLET, FILM COATED ORAL at 11:29

## 2018-06-03 RX ADMIN — Medication 100 MILLIGRAM(S): at 14:05

## 2018-06-03 RX ADMIN — OXYCODONE AND ACETAMINOPHEN 2 TABLET(S): 5; 325 TABLET ORAL at 09:52

## 2018-06-03 RX ADMIN — OXYCODONE AND ACETAMINOPHEN 2 TABLET(S): 5; 325 TABLET ORAL at 10:22

## 2018-06-03 RX ADMIN — HEPARIN SODIUM 5000 UNIT(S): 5000 INJECTION INTRAVENOUS; SUBCUTANEOUS at 06:24

## 2018-06-03 RX ADMIN — HYDROMORPHONE HYDROCHLORIDE 0.5 MILLIGRAM(S): 2 INJECTION INTRAMUSCULAR; INTRAVENOUS; SUBCUTANEOUS at 00:05

## 2018-06-03 RX ADMIN — MONTELUKAST 10 MILLIGRAM(S): 4 TABLET, CHEWABLE ORAL at 11:29

## 2018-06-03 RX ADMIN — Medication 100 MILLIGRAM(S): at 06:23

## 2018-06-03 RX ADMIN — SODIUM CHLORIDE 1000 MILLILITER(S): 9 INJECTION INTRAMUSCULAR; INTRAVENOUS; SUBCUTANEOUS at 16:14

## 2018-06-03 RX ADMIN — HYDROMORPHONE HYDROCHLORIDE 0.5 MILLIGRAM(S): 2 INJECTION INTRAMUSCULAR; INTRAVENOUS; SUBCUTANEOUS at 14:35

## 2018-06-03 RX ADMIN — Medication 50 MILLIGRAM(S): at 11:28

## 2018-06-03 RX ADMIN — PANTOPRAZOLE SODIUM 40 MILLIGRAM(S): 20 TABLET, DELAYED RELEASE ORAL at 06:24

## 2018-06-04 RX ORDER — KETOROLAC TROMETHAMINE 30 MG/ML
15 SYRINGE (ML) INJECTION ONCE
Qty: 0 | Refills: 0 | Status: DISCONTINUED | OUTPATIENT
Start: 2018-06-04 | End: 2018-06-04

## 2018-06-04 RX ORDER — MORPHINE SULFATE 50 MG/1
2 CAPSULE, EXTENDED RELEASE ORAL EVERY 4 HOURS
Qty: 0 | Refills: 0 | Status: DISCONTINUED | OUTPATIENT
Start: 2018-06-04 | End: 2018-06-05

## 2018-06-04 RX ADMIN — Medication 100 MILLIGRAM(S): at 14:17

## 2018-06-04 RX ADMIN — Medication 50 MILLIGRAM(S): at 11:12

## 2018-06-04 RX ADMIN — HYDROMORPHONE HYDROCHLORIDE 0.5 MILLIGRAM(S): 2 INJECTION INTRAMUSCULAR; INTRAVENOUS; SUBCUTANEOUS at 17:14

## 2018-06-04 RX ADMIN — Medication 15 MILLIGRAM(S): at 21:43

## 2018-06-04 RX ADMIN — SODIUM CHLORIDE 50 MILLILITER(S): 9 INJECTION INTRAMUSCULAR; INTRAVENOUS; SUBCUTANEOUS at 06:13

## 2018-06-04 RX ADMIN — MONTELUKAST 10 MILLIGRAM(S): 4 TABLET, CHEWABLE ORAL at 11:12

## 2018-06-04 RX ADMIN — Medication 1 TABLET(S): at 11:12

## 2018-06-04 RX ADMIN — Medication 100 MILLIGRAM(S): at 21:12

## 2018-06-04 RX ADMIN — GABAPENTIN 600 MILLIGRAM(S): 400 CAPSULE ORAL at 14:18

## 2018-06-04 RX ADMIN — HEPARIN SODIUM 5000 UNIT(S): 5000 INJECTION INTRAVENOUS; SUBCUTANEOUS at 21:12

## 2018-06-04 RX ADMIN — HEPARIN SODIUM 5000 UNIT(S): 5000 INJECTION INTRAVENOUS; SUBCUTANEOUS at 14:19

## 2018-06-04 RX ADMIN — HEPARIN SODIUM 5000 UNIT(S): 5000 INJECTION INTRAVENOUS; SUBCUTANEOUS at 06:10

## 2018-06-04 RX ADMIN — ESCITALOPRAM OXALATE 20 MILLIGRAM(S): 10 TABLET, FILM COATED ORAL at 11:12

## 2018-06-04 RX ADMIN — GABAPENTIN 600 MILLIGRAM(S): 400 CAPSULE ORAL at 21:12

## 2018-06-04 RX ADMIN — HYDROMORPHONE HYDROCHLORIDE 0.5 MILLIGRAM(S): 2 INJECTION INTRAMUSCULAR; INTRAVENOUS; SUBCUTANEOUS at 11:23

## 2018-06-04 RX ADMIN — Medication 15 MILLIGRAM(S): at 22:00

## 2018-06-04 RX ADMIN — GABAPENTIN 600 MILLIGRAM(S): 400 CAPSULE ORAL at 06:10

## 2018-06-04 RX ADMIN — PANTOPRAZOLE SODIUM 40 MILLIGRAM(S): 20 TABLET, DELAYED RELEASE ORAL at 06:10

## 2018-06-04 RX ADMIN — Medication 100 MILLIGRAM(S): at 06:09

## 2018-06-04 RX ADMIN — SODIUM CHLORIDE 1000 MILLILITER(S): 9 INJECTION INTRAMUSCULAR; INTRAVENOUS; SUBCUTANEOUS at 01:14

## 2018-06-04 RX ADMIN — SODIUM CHLORIDE 75 MILLILITER(S): 9 INJECTION INTRAMUSCULAR; INTRAVENOUS; SUBCUTANEOUS at 14:17

## 2018-06-04 RX ADMIN — OXYCODONE AND ACETAMINOPHEN 2 TABLET(S): 5; 325 TABLET ORAL at 14:06

## 2018-06-04 NOTE — PROGRESS NOTE ADULT - ATTENDING COMMENTS
The patient was seen on rounds.  I continue to encourage her to mobilize out of bed.  Continue with pain control.  Continue with DVT prophylaxis.

## 2018-06-05 RX ORDER — MORPHINE SULFATE 50 MG/1
4 CAPSULE, EXTENDED RELEASE ORAL EVERY 4 HOURS
Qty: 0 | Refills: 0 | Status: DISCONTINUED | OUTPATIENT
Start: 2018-06-05 | End: 2018-06-05

## 2018-06-05 RX ORDER — MORPHINE SULFATE 50 MG/1
2 CAPSULE, EXTENDED RELEASE ORAL EVERY 4 HOURS
Qty: 0 | Refills: 0 | Status: DISCONTINUED | OUTPATIENT
Start: 2018-06-05 | End: 2018-06-07

## 2018-06-05 RX ADMIN — MORPHINE SULFATE 2 MILLIGRAM(S): 50 CAPSULE, EXTENDED RELEASE ORAL at 21:47

## 2018-06-05 RX ADMIN — PANTOPRAZOLE SODIUM 40 MILLIGRAM(S): 20 TABLET, DELAYED RELEASE ORAL at 05:30

## 2018-06-05 RX ADMIN — OXYCODONE AND ACETAMINOPHEN 2 TABLET(S): 5; 325 TABLET ORAL at 17:48

## 2018-06-05 RX ADMIN — Medication 100 MILLIGRAM(S): at 13:42

## 2018-06-05 RX ADMIN — GABAPENTIN 600 MILLIGRAM(S): 400 CAPSULE ORAL at 13:42

## 2018-06-05 RX ADMIN — HEPARIN SODIUM 5000 UNIT(S): 5000 INJECTION INTRAVENOUS; SUBCUTANEOUS at 13:46

## 2018-06-05 RX ADMIN — ESCITALOPRAM OXALATE 20 MILLIGRAM(S): 10 TABLET, FILM COATED ORAL at 13:45

## 2018-06-05 RX ADMIN — MORPHINE SULFATE 2 MILLIGRAM(S): 50 CAPSULE, EXTENDED RELEASE ORAL at 01:02

## 2018-06-05 RX ADMIN — MONTELUKAST 10 MILLIGRAM(S): 4 TABLET, CHEWABLE ORAL at 13:43

## 2018-06-05 RX ADMIN — Medication 100 MILLIGRAM(S): at 21:41

## 2018-06-05 RX ADMIN — Medication 100 MILLIGRAM(S): at 05:30

## 2018-06-05 RX ADMIN — OXYCODONE AND ACETAMINOPHEN 2 TABLET(S): 5; 325 TABLET ORAL at 13:39

## 2018-06-05 RX ADMIN — GABAPENTIN 600 MILLIGRAM(S): 400 CAPSULE ORAL at 21:41

## 2018-06-05 RX ADMIN — SODIUM CHLORIDE 75 MILLILITER(S): 9 INJECTION INTRAMUSCULAR; INTRAVENOUS; SUBCUTANEOUS at 04:38

## 2018-06-05 RX ADMIN — GABAPENTIN 600 MILLIGRAM(S): 400 CAPSULE ORAL at 05:30

## 2018-06-05 RX ADMIN — FENTANYL CITRATE 1 PATCH: 50 INJECTION INTRAVENOUS at 17:02

## 2018-06-05 RX ADMIN — Medication 50 MILLIGRAM(S): at 13:44

## 2018-06-05 RX ADMIN — OXYCODONE AND ACETAMINOPHEN 2 TABLET(S): 5; 325 TABLET ORAL at 16:01

## 2018-06-05 RX ADMIN — HEPARIN SODIUM 5000 UNIT(S): 5000 INJECTION INTRAVENOUS; SUBCUTANEOUS at 21:40

## 2018-06-05 RX ADMIN — Medication 1 TABLET(S): at 13:43

## 2018-06-05 RX ADMIN — MORPHINE SULFATE 2 MILLIGRAM(S): 50 CAPSULE, EXTENDED RELEASE ORAL at 21:31

## 2018-06-05 RX ADMIN — HEPARIN SODIUM 5000 UNIT(S): 5000 INJECTION INTRAVENOUS; SUBCUTANEOUS at 05:30

## 2018-06-06 RX ORDER — KETOROLAC TROMETHAMINE 30 MG/ML
30 SYRINGE (ML) INJECTION ONCE
Qty: 0 | Refills: 0 | Status: DISCONTINUED | OUTPATIENT
Start: 2018-06-06 | End: 2018-06-06

## 2018-06-06 RX ADMIN — Medication 30 MILLIGRAM(S): at 11:30

## 2018-06-06 RX ADMIN — Medication 50 MILLIGRAM(S): at 11:02

## 2018-06-06 RX ADMIN — HEPARIN SODIUM 5000 UNIT(S): 5000 INJECTION INTRAVENOUS; SUBCUTANEOUS at 21:41

## 2018-06-06 RX ADMIN — HEPARIN SODIUM 5000 UNIT(S): 5000 INJECTION INTRAVENOUS; SUBCUTANEOUS at 14:40

## 2018-06-06 RX ADMIN — ESCITALOPRAM OXALATE 20 MILLIGRAM(S): 10 TABLET, FILM COATED ORAL at 14:40

## 2018-06-06 RX ADMIN — HEPARIN SODIUM 5000 UNIT(S): 5000 INJECTION INTRAVENOUS; SUBCUTANEOUS at 06:36

## 2018-06-06 RX ADMIN — OXYCODONE AND ACETAMINOPHEN 2 TABLET(S): 5; 325 TABLET ORAL at 23:08

## 2018-06-06 RX ADMIN — MORPHINE SULFATE 2 MILLIGRAM(S): 50 CAPSULE, EXTENDED RELEASE ORAL at 06:36

## 2018-06-06 RX ADMIN — OXYCODONE AND ACETAMINOPHEN 2 TABLET(S): 5; 325 TABLET ORAL at 18:10

## 2018-06-06 RX ADMIN — OXYCODONE AND ACETAMINOPHEN 2 TABLET(S): 5; 325 TABLET ORAL at 00:31

## 2018-06-06 RX ADMIN — PANTOPRAZOLE SODIUM 40 MILLIGRAM(S): 20 TABLET, DELAYED RELEASE ORAL at 06:36

## 2018-06-06 RX ADMIN — GABAPENTIN 600 MILLIGRAM(S): 400 CAPSULE ORAL at 21:41

## 2018-06-06 RX ADMIN — Medication 100 MILLIGRAM(S): at 14:40

## 2018-06-06 RX ADMIN — GABAPENTIN 600 MILLIGRAM(S): 400 CAPSULE ORAL at 06:35

## 2018-06-06 RX ADMIN — Medication 100 MILLIGRAM(S): at 06:36

## 2018-06-06 RX ADMIN — OXYCODONE AND ACETAMINOPHEN 2 TABLET(S): 5; 325 TABLET ORAL at 01:19

## 2018-06-06 RX ADMIN — MORPHINE SULFATE 2 MILLIGRAM(S): 50 CAPSULE, EXTENDED RELEASE ORAL at 07:00

## 2018-06-06 RX ADMIN — Medication 1 TABLET(S): at 11:02

## 2018-06-06 RX ADMIN — GABAPENTIN 600 MILLIGRAM(S): 400 CAPSULE ORAL at 14:40

## 2018-06-06 RX ADMIN — MONTELUKAST 10 MILLIGRAM(S): 4 TABLET, CHEWABLE ORAL at 11:02

## 2018-06-06 RX ADMIN — OXYCODONE AND ACETAMINOPHEN 2 TABLET(S): 5; 325 TABLET ORAL at 23:01

## 2018-06-06 RX ADMIN — Medication 30 MILLIGRAM(S): at 10:42

## 2018-06-06 RX ADMIN — Medication 100 MILLIGRAM(S): at 21:41

## 2018-06-06 NOTE — DIETITIAN INITIAL EVALUATION ADULT. - ENERGY NEEDS
Calories: 8055-1268 kcals/day (MSJ x 1.1-1.2)  Protein: 50-59 g/day (1.1-1.3 g/kg IBW)  Fluids: 1ml/kcal or per LIP (hypotension noted)

## 2018-06-06 NOTE — DIETITIAN INITIAL EVALUATION ADULT. - OTHER INFO
S/P L5-51 TLIF POD#6, episode of hypotension today, getting IVF. RD assessment as LOS. Pt reports good brennan/po intake, consuming 100% of documented meals per EMR. Denies any recent wt loss (reports UBW around 150 lbs). No food allergies.

## 2018-06-07 ENCOUNTER — TRANSCRIPTION ENCOUNTER (OUTPATIENT)
Age: 48
End: 2018-06-07

## 2018-06-07 VITALS
DIASTOLIC BLOOD PRESSURE: 57 MMHG | HEART RATE: 61 BPM | SYSTOLIC BLOOD PRESSURE: 89 MMHG | TEMPERATURE: 97 F | RESPIRATION RATE: 18 BRPM

## 2018-06-07 RX ORDER — ACETAMINOPHEN 500 MG
1 TABLET ORAL
Qty: 0 | Refills: 0 | DISCHARGE
Start: 2018-06-07

## 2018-06-07 RX ORDER — OXYCODONE HYDROCHLORIDE 5 MG/1
1 TABLET ORAL
Qty: 0 | Refills: 0 | COMMUNITY

## 2018-06-07 RX ORDER — DIPHENHYDRAMINE HCL 50 MG
1 CAPSULE ORAL
Qty: 0 | Refills: 0 | COMMUNITY

## 2018-06-07 RX ADMIN — PANTOPRAZOLE SODIUM 40 MILLIGRAM(S): 20 TABLET, DELAYED RELEASE ORAL at 06:33

## 2018-06-07 RX ADMIN — HEPARIN SODIUM 5000 UNIT(S): 5000 INJECTION INTRAVENOUS; SUBCUTANEOUS at 06:33

## 2018-06-07 RX ADMIN — OXYCODONE AND ACETAMINOPHEN 2 TABLET(S): 5; 325 TABLET ORAL at 13:40

## 2018-06-07 RX ADMIN — MONTELUKAST 10 MILLIGRAM(S): 4 TABLET, CHEWABLE ORAL at 12:25

## 2018-06-07 RX ADMIN — ESCITALOPRAM OXALATE 20 MILLIGRAM(S): 10 TABLET, FILM COATED ORAL at 12:26

## 2018-06-07 RX ADMIN — Medication 100 MILLIGRAM(S): at 06:33

## 2018-06-07 RX ADMIN — Medication 30 MILLILITER(S): at 06:33

## 2018-06-07 RX ADMIN — Medication 50 MILLIGRAM(S): at 12:26

## 2018-06-07 RX ADMIN — Medication 1 TABLET(S): at 12:25

## 2018-06-07 RX ADMIN — OXYCODONE AND ACETAMINOPHEN 2 TABLET(S): 5; 325 TABLET ORAL at 10:16

## 2018-06-07 RX ADMIN — GABAPENTIN 600 MILLIGRAM(S): 400 CAPSULE ORAL at 06:33

## 2018-06-07 NOTE — DISCHARGE NOTE ADULT - MEDICATION SUMMARY - MEDICATIONS TO STOP TAKING
I will STOP taking the medications listed below when I get home from the hospital:    oxyCODONE 10 mg oral tablet  -- 1 tab(s) by mouth every 8 hours, As Needed

## 2018-06-07 NOTE — DISCHARGE NOTE ADULT - MEDICATION SUMMARY - MEDICATIONS TO TAKE
I will START or STAY ON the medications listed below when I get home from the hospital:    fentanyl topical 25 mcg/hr transdermal film, extended release (obsolete)  -- Indication: For home pain med    oxyCODONE-acetaminophen 5 mg-325 mg oral tablet  -- 2 tab(s) by mouth every 6 hours, As Needed -Moderate Pain (4 - 6) MDD:8  -- Indication: For pain    acetaminophen 325 mg oral tablet  -- 1 tab(s) by mouth every 4 hours, As needed, Mild Pain (1 - 3)  -- Indication: For pain    gabapentin 600 mg oral tablet  -- 1 tab(s) by mouth 3 times a day  -- Indication: For home med    escitalopram 20 mg oral tablet  -- 1 tab(s) by mouth once a day  -- Indication: For home med    traZODone 50 mg oral tablet  -- Indication: For home med    ProAir HFA 90 mcg/inh inhalation aerosol  -- 2 puff(s) inhaled 4 times a day  -- Indication: For home med    montelukast 10 mg oral tablet  -- 1 tab(s) by mouth once a day  -- Indication: For home med    tiZANidine 4 mg oral tablet  -- 2 tab(s) by mouth every 8 hours  -- Indication: For home med    omeprazole 20 mg oral delayed release capsule  -- 1 cap(s) by mouth once a day  -- Indication: For home med    fluticasone propionate  -- Indication: For home med

## 2018-06-07 NOTE — PROGRESS NOTE ADULT - ATTENDING COMMENTS
Patient was seen and examined on rounds.  Patient's pain appears to be under control.  Patient cleared for discharge.  Patient instructed to follow with Dr. Strange in office.

## 2018-06-07 NOTE — DISCHARGE NOTE ADULT - CARE PROVIDER_API CALL
Ortega Strange), Neurological Surgery  [address] [place] [address]  Phone: [phone]  Fax: [phone]

## 2018-06-07 NOTE — PROGRESS NOTE ADULT - SUBJECTIVE AND OBJECTIVE BOX
Subjective: some RLE pain    T(C): 36.1 (06-05-18 @ 07:55), Max: 37 (06-05-18 @ 00:38)  HR: 66 (06-05-18 @ 07:55) (66 - 74)  BP: 83/50 (06-05-18 @ 07:55) (83/50 - 123/62)  RR: 18 (06-05-18 @ 00:38) (18 - 19)  SpO2: 98% (06-04-18 @ 11:18) (98% - 98%)  Wt(kg): --   s/p L5-S1 TLIF POD # 6  Exam: episode of hypotension today, getting IVF, dressing dry, no swelling, some right leg pain improving, 4+/5 RLE, appears due to pain, 5/5 LLE      Assessment/Plan: D/C IV morphine, PT
POD # 2    S/P TLIF L5-S1 with pedicle screw fixation         pt seen and examined at bedside pt sleeping but arousable, pt with c/o       Vital Signs Last 24 Hrs  T(C): 36.1 (01 Jun 2018 07:30), Max: 36.5 (31 May 2018 17:00)  T(F): 97 (01 Jun 2018 07:30), Max: 97.7 (31 May 2018 17:00)  HR: 79 (01 Jun 2018 07:30) (75 - 90)  BP: 86/51 (01 Jun 2018 07:30) (84/48 - 115/56)  BP(mean): 84 (31 May 2018 14:50) (84 - 84)  RR: 19 (01 Jun 2018 07:30) (17 - 19)  SpO2: 98% (31 May 2018 17:00) (98% - 98%)    PHYSICAL EXAM:  Alert, MAEX4   MS 5/5 bilateral  sensory intact   incision clean dry intact         MEDICATIONS:  Antibiotics:    Neuro:  acetaminophen   Tablet. 325 milliGRAM(s) Oral every 4 hours PRN  diphenhydrAMINE   Capsule 25 milliGRAM(s) Oral every 6 hours PRN  escitalopram 20 milliGRAM(s) Oral daily  fentaNYL   Patch  25 MICROgram(s)/Hr 1 Patch Transdermal every 72 hours  gabapentin 600 milliGRAM(s) Oral three times a day  HYDROmorphone  Injectable 0.5 milliGRAM(s) IV Push every 4 hours PRN  ondansetron Injectable 4 milliGRAM(s) IV Push every 6 hours PRN  ondansetron Injectable 4 milliGRAM(s) IV Push every 6 hours PRN  oxyCODONE    5 mG/acetaminophen 325 mG 2 Tablet(s) Oral every 4 hours PRN  traZODone 50 milliGRAM(s) Oral daily    Anticoagulation:  heparin  Injectable 5000 Unit(s) SubCutaneous every 8 hours    OTHER:  ALBUTerol    90 MICROgram(s) HFA Inhaler 2 Puff(s) Inhalation every 6 hours PRN  bisacodyl 5 milliGRAM(s) Oral daily PRN  bisacodyl Suppository 10 milliGRAM(s) Rectal once PRN  docusate sodium 100 milliGRAM(s) Oral three times a day  montelukast 10 milliGRAM(s) Oral daily  naloxone Injectable 0.1 milliGRAM(s) IV Push every 3 minutes PRN  pantoprazole    Tablet 40 milliGRAM(s) Oral before breakfast  senna 2 Tablet(s) Oral at bedtime PRN    IVF:  multivitamin 1 Tablet(s) Oral daily  sodium chloride 0.9%. 1000 milliLiter(s) IV Continuous <Continuous>      A/P       S/P TLIF L5- S1 with pedicle screw fixation             d/c devries             pain control             PT/ Rehab
POD # 4    S/P   TLIF L5-S1 with pedicle screw fixation        pt seen and examined at bedside states she has some discomfort still but was resting comfortably when i saw her       Vital Signs Last 24 Hrs  T(C): 36 (02 Jun 2018 07:37), Max: 36.6 (01 Jun 2018 16:14)  T(F): 96.8 (02 Jun 2018 07:37), Max: 97.9 (01 Jun 2018 23:34)  HR: 70 (02 Jun 2018 07:37) (70 - 80)  BP: 94/52 (02 Jun 2018 07:37) (94/52 - 111/57)  BP(mean): --  RR: 18 (02 Jun 2018 07:37) (18 - 20)  SpO2: --    PHYSICAL EXAM:  alert, MAEX4   sensory intact  MS 5/5  bilateral UE's         5/5  bilateral LE's   incision clean dry intact         MEDICATIONS:  Antibiotics:    Neuro:  acetaminophen   Tablet. 325 milliGRAM(s) Oral every 4 hours PRN  diphenhydrAMINE   Capsule 25 milliGRAM(s) Oral every 6 hours PRN  escitalopram 20 milliGRAM(s) Oral daily  fentaNYL   Patch  25 MICROgram(s)/Hr 1 Patch Transdermal every 72 hours  gabapentin 600 milliGRAM(s) Oral three times a day  HYDROmorphone  Injectable 0.5 milliGRAM(s) IV Push every 4 hours PRN  ondansetron Injectable 4 milliGRAM(s) IV Push every 6 hours PRN  ondansetron Injectable 4 milliGRAM(s) IV Push every 6 hours PRN  oxyCODONE    5 mG/acetaminophen 325 mG 2 Tablet(s) Oral every 4 hours PRN  traZODone 50 milliGRAM(s) Oral daily    Anticoagulation:  heparin  Injectable 5000 Unit(s) SubCutaneous every 8 hours    OTHER:  ALBUTerol    90 MICROgram(s) HFA Inhaler 2 Puff(s) Inhalation every 6 hours PRN  bisacodyl 5 milliGRAM(s) Oral daily PRN  bisacodyl Suppository 10 milliGRAM(s) Rectal once PRN  docusate sodium 100 milliGRAM(s) Oral three times a day  montelukast 10 milliGRAM(s) Oral daily  naloxone Injectable 0.1 milliGRAM(s) IV Push every 3 minutes PRN  pantoprazole    Tablet 40 milliGRAM(s) Oral before breakfast  senna 2 Tablet(s) Oral at bedtime PRN    IVF:  multivitamin 1 Tablet(s) Oral daily  sodium chloride 0.9%. 1000 milliLiter(s) IV Continuous <Continuous>      A/P        S/P TLIF L5-S1 with pedicle screw fixation              encourage OOB              pain control              PT/ rehab
POD # 5    S/P TLIF L5-S1 with pedicle screw fixation         patient seen and examined at bedside pt laying in bed , i told her she needs to ambulate more and sit in a chair.  has c/o of some incisional pain       Vital Signs Last 24 Hrs  T(C): 36.3 (03 Jun 2018 07:52), Max: 37.2 (02 Jun 2018 16:38)  T(F): 97.4 (03 Jun 2018 07:52), Max: 99 (02 Jun 2018 16:38)  HR: 62 (03 Jun 2018 07:52) (62 - 83)  BP: 102/58 (03 Jun 2018 07:52) (96/53 - 194/54)  BP(mean): --  RR: 18 (03 Jun 2018 07:52) (16 - 18)  SpO2: --    PHYSICAL EXAM:  Alert. MAEX4   Sensory intact   MS  bilateral UE's 5/5          bilateral LE's 5/5  incision clean dry intact         MEDICATIONS:  Antibiotics:    Neuro:  acetaminophen   Tablet. 325 milliGRAM(s) Oral every 4 hours PRN  diphenhydrAMINE   Capsule 25 milliGRAM(s) Oral every 6 hours PRN  escitalopram 20 milliGRAM(s) Oral daily  fentaNYL   Patch  25 MICROgram(s)/Hr 1 Patch Transdermal every 72 hours  gabapentin 600 milliGRAM(s) Oral three times a day  HYDROmorphone  Injectable 0.5 milliGRAM(s) IV Push every 4 hours PRN  ondansetron Injectable 4 milliGRAM(s) IV Push every 6 hours PRN  ondansetron Injectable 4 milliGRAM(s) IV Push every 6 hours PRN  oxyCODONE    5 mG/acetaminophen 325 mG 2 Tablet(s) Oral every 4 hours PRN  traZODone 50 milliGRAM(s) Oral daily    Anticoagulation:  heparin  Injectable 5000 Unit(s) SubCutaneous every 8 hours    OTHER:  ALBUTerol    90 MICROgram(s) HFA Inhaler 2 Puff(s) Inhalation every 6 hours PRN  bisacodyl 5 milliGRAM(s) Oral daily PRN  bisacodyl Suppository 10 milliGRAM(s) Rectal once PRN  docusate sodium 100 milliGRAM(s) Oral three times a day  montelukast 10 milliGRAM(s) Oral daily  naloxone Injectable 0.1 milliGRAM(s) IV Push every 3 minutes PRN  pantoprazole    Tablet 40 milliGRAM(s) Oral before breakfast  senna 2 Tablet(s) Oral at bedtime PRN    IVF:  multivitamin 1 Tablet(s) Oral daily  sodium chloride 0.9%. 1000 milliLiter(s) IV Continuous <Continuous>  sodium chloride 0.9%. 500 milliLiter(s) IV Continuous <Continuous>    A/p       S/P TLIF L5- S1 with pedicle screw fixation            pain control             encourage OOB             PT/ rehab
POD # 7    S/P L5-S1 TLIF with Pedicle Screw Fixation    Pt seen and examined at bedside. Pt c/o incisional pain at this time. c/o some residual lower extremity parasthesias however improved fro pre-op.    Vital Signs Last 24 Hrs  T(C): 35.9 (06 Jun 2018 07:54), Max: 36.7 (06 Jun 2018 00:08)  T(F): 96.7 (06 Jun 2018 07:54), Max: 98 (06 Jun 2018 00:08)  HR: 62 (06 Jun 2018 07:54) (62 - 70)  BP: 118/67 (06 Jun 2018 07:54) (108/58 - 125/65)  BP(mean): --  RR: 18 (05 Jun 2018 15:46) (18 - 18)  SpO2: 99% (06 Jun 2018 08:00) (99% - 99%)    PHYSICAL EXAM:  Strength 5/5  Sensation decreases to light touch right lower extremity compared to left lower extremity  KJ 2+ B/L  Incision C/D/I    MEDICATIONS:  Antibiotics:    Neuro:  acetaminophen   Tablet. 325 milliGRAM(s) Oral every 4 hours PRN  diphenhydrAMINE   Capsule 25 milliGRAM(s) Oral every 6 hours PRN  escitalopram 20 milliGRAM(s) Oral daily  gabapentin 600 milliGRAM(s) Oral three times a day  morphine  - Injectable 2 milliGRAM(s) IV Push every 4 hours PRN  ondansetron Injectable 4 milliGRAM(s) IV Push every 6 hours PRN  ondansetron Injectable 4 milliGRAM(s) IV Push every 6 hours PRN  oxyCODONE    5 mG/acetaminophen 325 mG 2 Tablet(s) Oral every 4 hours PRN  traZODone 50 milliGRAM(s) Oral daily    Anticoagulation:  heparin  Injectable 5000 Unit(s) SubCutaneous every 8 hours    OTHER:  ALBUTerol    90 MICROgram(s) HFA Inhaler 2 Puff(s) Inhalation every 6 hours PRN  bisacodyl 5 milliGRAM(s) Oral daily PRN  bisacodyl Suppository 10 milliGRAM(s) Rectal once PRN  docusate sodium 100 milliGRAM(s) Oral three times a day  montelukast 10 milliGRAM(s) Oral daily  naloxone Injectable 0.1 milliGRAM(s) IV Push every 3 minutes PRN  pantoprazole    Tablet 40 milliGRAM(s) Oral before breakfast  senna 2 Tablet(s) Oral at bedtime PRN    IVF:  multivitamin 1 Tablet(s) Oral daily  sodium chloride 0.9%. 1000 milliLiter(s) IV Continuous <Continuous>    Assessment:  As above    Plan:  Angy STEINBERG for D/C Planning
Post Op Day # 1   s/p  [id number] pedicles screws    SUBJECTIVE    48y Female   c/o back pain, no leg pain.   Denies CP, SOB, N/V/D, weakness, numbness  No new complaints     OBJECTIVE     Vital Signs Last 24 Hrs  T(C): 37.4 (31 May 2018 11:00), Max: 37.4 (31 May 2018 11:00)  T(F): 99.4 (31 May 2018 11:00), Max: 99.4 (31 May 2018 11:00)  HR: 88 (31 May 2018 11:00) (71 - 88)  BP: 119/57 (31 May 2018 11:00) (99/63 - 142/77)  BP(mean): 94 (30 May 2018 21:00) (94 - 102)  RR: 19 (31 May 2018 11:00) (10 - 28)  SpO2: 99% (31 May 2018 11:00) (95% - 100%)  I&O's Summary    30 May 2018 07:01  -  31 May 2018 07:00  --------------------------------------------------------  IN: 2840 mL / OUT: 6070 mL / NET: -3230 mL    31 May 2018 07:01  -  31 May 2018 13:18  --------------------------------------------------------  IN: 0 mL / OUT: 4400 mL / NET: -4400 mL        PHYSICAL EXAM    *Primary surgical dressing / Surgical incision* C/D/I  Calves supple/nontender bilaterally  Sensation grossly intact to light touch bilaterally  motors: good Dorsiflex and Plantar flex.  leg extension and hip flexion limited ROM due to back pain  EHL intact bilaterally            ASSESSMENT AND PLAN:     -  Pain control as clinically indicated  -  DVT prophylaxis: SCDs       -  Continue current antibiotics as per protocol  -  PT/OT: Weight bearing as tolerated  -  Incentive spirometry  - IVF  - Advance diet as tolerated  - Follow up labs  - Disposition: Rehab      pending PT evaluation  - PCA, Hanson to be dc'cherelle in am when more oob
Post op check     S/P  TLIF L5- S1        pt seen and examined at bedside , pt c/o of incisional pain .  Pt got benadryl for itching.       Vital Signs Last 24 Hrs  T(C): 36.4 (30 May 2018 13:00), Max: 36.7 (30 May 2018 06:38)  T(F): 97.6 (30 May 2018 13:00), Max: 98.1 (30 May 2018 06:38)  HR: 75 (30 May 2018 16:00) (62 - 80)  BP: 112/66 (30 May 2018 16:00) (84/59 - 142/94)  BP(mean): --  RR: 23 (30 May 2018 16:00) (7 - 23)  SpO2: 96% (30 May 2018 16:00) (95% - 100%)    PHYSICAL EXAM:    Alert, MAEX4   MS    RLE 5/5 distal   5-/5 proximal secondary to pain            LLE 5/5 distal   5-/5 proximal secondary to pain   reflexes 2+ bilaterally   incision clean dry intact         MEDICATIONS:  Antibiotics:  ceFAZolin   IVPB 1000 milliGRAM(s) IV Intermittent every 8 hours    Neuro:  acetaminophen   Tablet. 325 milliGRAM(s) Oral every 4 hours PRN  diazepam    Tablet 5 milliGRAM(s) Oral every 8 hours  diphenhydrAMINE   Capsule 25 milliGRAM(s) Oral every 6 hours PRN  escitalopram 20 milliGRAM(s) Oral daily  fentaNYL   Patch  25 MICROgram(s)/Hr 1 Patch Transdermal every 72 hours  gabapentin 600 milliGRAM(s) Oral three times a day  HYDROmorphone  Injectable 0.5 milliGRAM(s) IV Push every 10 minutes PRN  HYDROmorphone PCA (1 mG/mL) 30 milliLiter(s) PCA Continuous PCA Continuous  morphine  - Injectable 2 milliGRAM(s) IV Push every 4 hours PRN  ondansetron Injectable 4 milliGRAM(s) IV Push every 6 hours PRN  ondansetron Injectable 4 milliGRAM(s) IV Push once PRN  ondansetron Injectable 4 milliGRAM(s) IV Push every 6 hours PRN  oxyCODONE    5 mG/acetaminophen 325 mG 2 Tablet(s) Oral every 4 hours PRN  oxyCODONE    5 mG/acetaminophen 325 mG 2 Tablet(s) Oral every 6 hours PRN  traZODone 50 milliGRAM(s) Oral daily    Anticoagulation:    OTHER:  ALBUTerol    90 MICROgram(s) HFA Inhaler 2 Puff(s) Inhalation every 6 hours PRN  bisacodyl 5 milliGRAM(s) Oral daily PRN  bisacodyl Suppository 10 milliGRAM(s) Rectal once PRN  docusate sodium 100 milliGRAM(s) Oral three times a day  montelukast 10 milliGRAM(s) Oral daily  naloxone Injectable 0.1 milliGRAM(s) IV Push every 3 minutes PRN  pantoprazole    Tablet 40 milliGRAM(s) Oral before breakfast  senna 2 Tablet(s) Oral at bedtime PRN    IVF:  lactated ringers. 1000 milliLiter(s) IV Continuous <Continuous>  multivitamin 1 Tablet(s) Oral daily  sodium chloride 0.9%. 1000 milliLiter(s) IV Continuous <Continuous>    A/P        S/P TLIF  L5- S1              pain control              PT/ rehab
Subjective: 48yFemale with a pmhx of M47.812,[id number]  ^M47.812,67159,[id number]  MEWS Score  History of cholecystectomy  Depression, unspecified depression type  PTSD (post-traumatic stress disorder)  Anxiety  Gastroesophageal reflux disease, esophagitis presence not specified  Chronic back pain, unspecified back location, unspecified back pain laterality  Asthma, unspecified asthma severity, unspecified whether complicated, unspecified whether persistent  Lumbar degenerative disc disease  Lumbar degenerative disc disease  Transforaminal lumbar interbody fusion (TLIF) at one level  H/O neck surgery  History of cholecystectomy    POD # 8    S/P L5-S1 TLIF with Pedicle Screw Fixation    Pt seen and examined at bedside. Pt c/o incisional pain at this time. c/o some residual lower extremity parasthesias however improved fro pre-op. PT ambulated well with Pt/Rehab.       T(C): 36.1 (06-07-18 @ 07:23), Max: 36.8 (06-06-18 @ 16:10)  HR: 61 (06-07-18 @ 07:23) (61 - 66)  BP: 89/57 (06-07-18 @ 07:23) (88/52 - 118/74)  RR: 18 (06-07-18 @ 07:23) (18 - 18)      MEDICATIONS  (STANDING):  docusate sodium 100 milliGRAM(s) Oral three times a day  escitalopram 20 milliGRAM(s) Oral daily  gabapentin 600 milliGRAM(s) Oral three times a day  heparin  Injectable 5000 Unit(s) SubCutaneous every 8 hours  montelukast 10 milliGRAM(s) Oral daily  multivitamin 1 Tablet(s) Oral daily  pantoprazole    Tablet 40 milliGRAM(s) Oral before breakfast  sodium chloride 0.9%. 1000 milliLiter(s) (75 mL/Hr) IV Continuous <Continuous>  traZODone 50 milliGRAM(s) Oral daily    MEDICATIONS  (PRN):  acetaminophen   Tablet. 325 milliGRAM(s) Oral every 4 hours PRN Mild Pain (1 - 3)  ALBUTerol    90 MICROgram(s) HFA Inhaler 2 Puff(s) Inhalation every 6 hours PRN Wheezing  aluminum hydroxide/magnesium hydroxide/simethicone Suspension 30 milliLiter(s) Oral every 4 hours PRN Dyspepsia  bisacodyl 5 milliGRAM(s) Oral daily PRN Constipation  bisacodyl Suppository 10 milliGRAM(s) Rectal once PRN Constipation  diphenhydrAMINE   Capsule 25 milliGRAM(s) Oral every 6 hours PRN Rash and/or Itching  morphine  - Injectable 2 milliGRAM(s) IV Push every 4 hours PRN Severe Pain (7 - 10)  naloxone Injectable 0.1 milliGRAM(s) IV Push every 3 minutes PRN For ANY of the following changes in patient status:  A. RR LESS THAN 10 breaths per minute, B. Oxygen saturation LESS THAN 90%, C. Sedation score of 6  ondansetron Injectable 4 milliGRAM(s) IV Push every 6 hours PRN Nausea  ondansetron Injectable 4 milliGRAM(s) IV Push every 6 hours PRN Nausea and/or Vomiting  oxyCODONE    5 mG/acetaminophen 325 mG 2 Tablet(s) Oral every 4 hours PRN Moderate Pain (4 - 6)  senna 2 Tablet(s) Oral at bedtime PRN Constipation      PHYSICAL EXAM:  Strength 5/5  Sensation decreases to light touch right lower extremity compared to left lower extremity  KJ 2+ B/L  Incision C/D/I        Assessment/Plan: as above  given script fpr walker  home care set up by social work  will d/c home   d/w attending
Subjective: 48yFemale with a pmhx of M47.812,[id number]  ^M47.812,15831,[id number]  MEWS Score  History of cholecystectomy  Depression, unspecified depression type  PTSD (post-traumatic stress disorder)  Anxiety  Gastroesophageal reflux disease, esophagitis presence not specified  Chronic back pain, unspecified back location, unspecified back pain laterality  Asthma, unspecified asthma severity, unspecified whether complicated, unspecified whether persistent  Lumbar degenerative disc disease  Lumbar degenerative disc disease  Transforaminal lumbar interbody fusion (TLIF) at one level  H/O neck surgery  History of cholecystectomy    POD # 6    S/P TLIF L5-S1 with pedicle screw fixation         patient seen and examined at bedside pt laying in bed , dressing was changed. Complaining  of some incisional pain snd some residual numbness and tingling to the b/l feet.    T(C): 36.1 (06-04-18 @ 08:03), Max: 36.4 (06-03-18 @ 23:43)  HR: 64 (06-04-18 @ 08:03) (64 - 75)  BP: 96/51 (06-04-18 @ 08:03) (90/51 - 125/67)  RR: 18 (06-04-18 @ 08:03) (16 - 18)      MEDICATIONS  (STANDING):  docusate sodium 100 milliGRAM(s) Oral three times a day  escitalopram 20 milliGRAM(s) Oral daily  fentaNYL   Patch  25 MICROgram(s)/Hr 1 Patch Transdermal every 72 hours  gabapentin 600 milliGRAM(s) Oral three times a day  heparin  Injectable 5000 Unit(s) SubCutaneous every 8 hours  montelukast 10 milliGRAM(s) Oral daily  multivitamin 1 Tablet(s) Oral daily  pantoprazole    Tablet 40 milliGRAM(s) Oral before breakfast  sodium chloride 0.9%. 1000 milliLiter(s) (75 mL/Hr) IV Continuous <Continuous>  sodium chloride 0.9%. 500 milliLiter(s) (125 mL/Hr) IV Continuous <Continuous>  sodium chloride 0.9%. 1000 milliLiter(s) (50 mL/Hr) IV Continuous <Continuous>  traZODone 50 milliGRAM(s) Oral daily    MEDICATIONS  (PRN):  acetaminophen   Tablet. 325 milliGRAM(s) Oral every 4 hours PRN Mild Pain (1 - 3)  ALBUTerol    90 MICROgram(s) HFA Inhaler 2 Puff(s) Inhalation every 6 hours PRN Wheezing  bisacodyl 5 milliGRAM(s) Oral daily PRN Constipation  bisacodyl Suppository 10 milliGRAM(s) Rectal once PRN Constipation  diphenhydrAMINE   Capsule 25 milliGRAM(s) Oral every 6 hours PRN Rash and/or Itching  HYDROmorphone  Injectable 0.5 milliGRAM(s) IV Push every 4 hours PRN Severe Pain (7 - 10)  naloxone Injectable 0.1 milliGRAM(s) IV Push every 3 minutes PRN For ANY of the following changes in patient status:  A. RR LESS THAN 10 breaths per minute, B. Oxygen saturation LESS THAN 90%, C. Sedation score of 6  ondansetron Injectable 4 milliGRAM(s) IV Push every 6 hours PRN Nausea  ondansetron Injectable 4 milliGRAM(s) IV Push every 6 hours PRN Nausea and/or Vomiting  oxyCODONE    5 mG/acetaminophen 325 mG 2 Tablet(s) Oral every 4 hours PRN Moderate Pain (4 - 6)  senna 2 Tablet(s) Oral at bedtime PRN Constipation        Exam:  Alert. MAEX4   Sensory intact   MS  bilateral UE's 5/5          LE's right side 4+/5 proximally due to pain, 5/5 left side  incision clean dry intact, changed dressing this am      Imaging:    < from: Xray Chest 2 Views PA/Lat (05.23.18 @ 08:49) >  Impression:      No radiographic evidence of acute cardiopulmonary disease.    < end of copied text >      Assessment/Plan: as above  kept IVF for SBP on the low side  pt/rehab  pain control  OOB and ambulate as tolerated  d/w attending

## 2018-06-07 NOTE — DISCHARGE NOTE ADULT - CARE PLAN
Principal Discharge DX:	Chronic back pain, unspecified back location, unspecified back pain laterality  Goal:	s/p TLIF L5-S1  Assessment and plan of treatment:	s/p surgical fixation

## 2018-06-07 NOTE — DISCHARGE NOTE ADULT - NS AS ACTIVITY OBS
No Heavy lifting/straining/Do not make important decisions/Showering allowed/Walking-Indoors allowed/Do not drive on pain medication/Walking-Outdoors allowed/Stairs allowed/Do not drive or operate machinery

## 2018-06-07 NOTE — DISCHARGE NOTE ADULT - ADDITIONAL INSTRUCTIONS
can leave incision open to air, may shower, let warm soapy water run over incision. Do not scrub incision. follow up in the office in 10-14 days. Follow up with Dr. Strange [phone]. Follow up with your primary care doctor.

## 2018-06-07 NOTE — DISCHARGE NOTE ADULT - PATIENT PORTAL LINK FT
You can access the [facility] Patient Portal, offered by [facility], by registering with the following website: [email or web address]

## 2018-06-07 NOTE — PROGRESS NOTE ADULT - PROVIDER SPECIALTY LIST ADULT
Neurosurgery

## 2018-06-07 NOTE — DISCHARGE NOTE ADULT - HOSPITAL COURSE
Pt is a 47 yo F with chronic back pain who came in for TLIF L5-S1 on 5/30/18. Pt went to the OR and did well post op. Pt started working with PT/Rehab but had a lo0t of pain. Worked on pain control and got her TLSO brace for comfort when ambulating and pt improved. PT was screened by social work and pt cleared for home with home care. Pt will follow up with Dr. Gabriel in the office

## 2018-06-13 DIAGNOSIS — M51.37 OTHER INTERVERTEBRAL DISC DEGENERATION, LUMBOSACRAL REGION: ICD-10-CM

## 2018-06-13 DIAGNOSIS — M47.816 SPONDYLOSIS WITHOUT MYELOPATHY OR RADICULOPATHY, LUMBAR REGION: ICD-10-CM

## 2018-06-13 DIAGNOSIS — F32.9 MAJOR DEPRESSIVE DISORDER, SINGLE EPISODE, UNSPECIFIED: ICD-10-CM

## 2018-06-13 DIAGNOSIS — F41.9 ANXIETY DISORDER, UNSPECIFIED: ICD-10-CM

## 2018-07-27 ENCOUNTER — APPOINTMENT (OUTPATIENT)
Dept: GASTROENTEROLOGY | Facility: CLINIC | Age: 48
End: 2018-07-27

## 2018-08-03 ENCOUNTER — APPOINTMENT (OUTPATIENT)
Dept: INTERNAL MEDICINE | Facility: CLINIC | Age: 48
End: 2018-08-03

## 2018-08-13 ENCOUNTER — RX RENEWAL (OUTPATIENT)
Age: 48
End: 2018-08-13

## 2018-08-14 ENCOUNTER — RX RENEWAL (OUTPATIENT)
Age: 48
End: 2018-08-14

## 2018-08-30 ENCOUNTER — RX RENEWAL (OUTPATIENT)
Age: 48
End: 2018-08-30

## 2018-08-31 ENCOUNTER — OUTPATIENT (OUTPATIENT)
Dept: OUTPATIENT SERVICES | Facility: HOSPITAL | Age: 48
LOS: 1 days | Discharge: HOME | End: 2018-08-31

## 2018-08-31 ENCOUNTER — APPOINTMENT (OUTPATIENT)
Dept: INTERNAL MEDICINE | Facility: CLINIC | Age: 48
End: 2018-08-31

## 2018-08-31 VITALS
HEART RATE: 87 BPM | BODY MASS INDEX: 29.06 KG/M2 | WEIGHT: 148 LBS | HEIGHT: 60 IN | DIASTOLIC BLOOD PRESSURE: 85 MMHG | SYSTOLIC BLOOD PRESSURE: 132 MMHG

## 2018-08-31 DIAGNOSIS — M54.5 LOW BACK PAIN: ICD-10-CM

## 2018-08-31 DIAGNOSIS — Z98.890 OTHER SPECIFIED POSTPROCEDURAL STATES: Chronic | ICD-10-CM

## 2018-08-31 PROBLEM — J45.909 UNSPECIFIED ASTHMA, UNCOMPLICATED: Chronic | Status: ACTIVE | Noted: 2018-04-06

## 2018-08-31 PROBLEM — F43.10 POST-TRAUMATIC STRESS DISORDER, UNSPECIFIED: Chronic | Status: ACTIVE | Noted: 2018-04-06

## 2018-08-31 PROBLEM — F41.9 ANXIETY DISORDER, UNSPECIFIED: Chronic | Status: ACTIVE | Noted: 2018-04-06

## 2018-08-31 PROBLEM — F32.9 MAJOR DEPRESSIVE DISORDER, SINGLE EPISODE, UNSPECIFIED: Chronic | Status: ACTIVE | Noted: 2018-04-06

## 2018-08-31 PROBLEM — K21.9 GASTRO-ESOPHAGEAL REFLUX DISEASE WITHOUT ESOPHAGITIS: Chronic | Status: ACTIVE | Noted: 2018-04-06

## 2018-08-31 PROBLEM — M54.9 DORSALGIA, UNSPECIFIED: Chronic | Status: ACTIVE | Noted: 2018-04-06

## 2018-08-31 NOTE — PHYSICAL EXAM
[Supple] : supple [No Respiratory Distress] : no respiratory distress  [Clear to Auscultation] : lungs were clear to auscultation bilaterally [Normal Rate] : normal rate  [Regular Rhythm] : with a regular rhythm [Normal S1, S2] : normal S1 and S2 [Soft] : abdomen soft [Non Tender] : non-tender [Non-distended] : non-distended

## 2018-08-31 NOTE — HISTORY OF PRESENT ILLNESS
[FreeTextEntry1] : Follow up visit  [de-identified] : 48 F presents for routine follow up and referral for physical therapy. Patient is s/p Lumbar Fusion L5-S1 in May. She reports that she now has a different type of pain in her back. She is following with pain management who changed her pain medication to Percocet TID, Gabapentin 800 mg, Cyclobenzaprine. She was told by pain management that she needs physical therapy and is requesting referral.\par \par Denies other symptoms s/a fever/chills/cp/sob/urinary or bowel symptoms.

## 2018-08-31 NOTE — ASSESSMENT
[FreeTextEntry1] : 48 F presents for routine follow up and referral for physical therapy. Patient is s/p Lumbar Fusion L5-S1 in May.\par \par #chronic back pain s/p lumbar fusion\par -pt follows with pain management\par - c/w pain regiment as directed by pain management\par - physical therapy referral\par - still on fentanyl patch, Percocet tabs being weaned\par - complains of tingling would also refer to neurology\par \par \par #depression\par -controlled with trazodone and escitalopram\par \par #GERD\par c/w omeprazole 40\par Missed previous GI appt, will reschedule\par \par #HCM\par HPV neg 10/2017, mammo birads 2 9/2017 - per patient she has an appt in December\par RTC in 3 months

## 2018-08-31 NOTE — REVIEW OF SYSTEMS
[Back Pain] : back pain [Fever] : no fever [Chest Pain] : no chest pain [Palpitations] : no palpitations [Shortness Of Breath] : no shortness of breath [Cough] : no cough [Abdominal Pain] : no abdominal pain [Nausea] : no nausea [Vomiting] : no vomiting [Dysuria] : no dysuria

## 2018-09-06 ENCOUNTER — RX RENEWAL (OUTPATIENT)
Age: 48
End: 2018-09-06

## 2018-09-11 ENCOUNTER — RX RENEWAL (OUTPATIENT)
Age: 48
End: 2018-09-11

## 2018-09-18 ENCOUNTER — APPOINTMENT (OUTPATIENT)
Dept: NEUROLOGY | Facility: CLINIC | Age: 48
End: 2018-09-18

## 2018-09-18 ENCOUNTER — OUTPATIENT (OUTPATIENT)
Dept: OUTPATIENT SERVICES | Facility: HOSPITAL | Age: 48
LOS: 1 days | Discharge: HOME | End: 2018-09-18

## 2018-09-18 VITALS
WEIGHT: 146 LBS | HEIGHT: 59 IN | HEART RATE: 92 BPM | TEMPERATURE: 98 F | DIASTOLIC BLOOD PRESSURE: 88 MMHG | SYSTOLIC BLOOD PRESSURE: 154 MMHG | BODY MASS INDEX: 29.43 KG/M2

## 2018-09-18 DIAGNOSIS — Z98.890 OTHER SPECIFIED POSTPROCEDURAL STATES: Chronic | ICD-10-CM

## 2018-09-18 RX ORDER — CAMPHOR 0.45 %
25 GEL (GRAM) TOPICAL EVERY 8 HOURS
Qty: 90 | Refills: 6 | Status: DISCONTINUED | COMMUNITY
Start: 2017-07-14 | End: 2018-09-18

## 2018-09-20 ENCOUNTER — OTHER (OUTPATIENT)
Age: 48
End: 2018-09-20

## 2018-09-21 ENCOUNTER — OUTPATIENT (OUTPATIENT)
Dept: OUTPATIENT SERVICES | Facility: HOSPITAL | Age: 48
LOS: 1 days | Discharge: HOME | End: 2018-09-21

## 2018-09-21 DIAGNOSIS — R68.89 OTHER GENERAL SYMPTOMS AND SIGNS: ICD-10-CM

## 2018-09-21 DIAGNOSIS — Z98.890 OTHER SPECIFIED POSTPROCEDURAL STATES: Chronic | ICD-10-CM

## 2018-09-25 LAB
HIV1+2 AB SPEC QL IA.RAPID: NONREACTIVE
RPR SER-TITR: NORMAL
TSH SERPL-ACNC: 1.82 UIU/ML
VIT B12 SERPL-MCNC: 530 PG/ML

## 2018-09-28 ENCOUNTER — RX RENEWAL (OUTPATIENT)
Age: 48
End: 2018-09-28

## 2018-10-05 ENCOUNTER — OUTPATIENT (OUTPATIENT)
Dept: OUTPATIENT SERVICES | Facility: HOSPITAL | Age: 48
LOS: 1 days | Discharge: HOME | End: 2018-10-05

## 2018-10-05 ENCOUNTER — APPOINTMENT (OUTPATIENT)
Dept: GASTROENTEROLOGY | Facility: CLINIC | Age: 48
End: 2018-10-05

## 2018-10-05 VITALS
WEIGHT: 148 LBS | HEART RATE: 60 BPM | DIASTOLIC BLOOD PRESSURE: 90 MMHG | BODY MASS INDEX: 29.84 KG/M2 | SYSTOLIC BLOOD PRESSURE: 146 MMHG | HEIGHT: 59 IN

## 2018-10-05 DIAGNOSIS — Z98.890 OTHER SPECIFIED POSTPROCEDURAL STATES: Chronic | ICD-10-CM

## 2018-11-02 ENCOUNTER — APPOINTMENT (OUTPATIENT)
Dept: INTERNAL MEDICINE | Facility: CLINIC | Age: 48
End: 2018-11-02

## 2018-11-02 ENCOUNTER — OUTPATIENT (OUTPATIENT)
Dept: OUTPATIENT SERVICES | Facility: HOSPITAL | Age: 48
LOS: 1 days | Discharge: HOME | End: 2018-11-02

## 2018-11-02 VITALS
SYSTOLIC BLOOD PRESSURE: 121 MMHG | TEMPERATURE: 99.4 F | WEIGHT: 141 LBS | HEART RATE: 82 BPM | HEIGHT: 59 IN | DIASTOLIC BLOOD PRESSURE: 85 MMHG | BODY MASS INDEX: 28.43 KG/M2

## 2018-11-02 DIAGNOSIS — Z98.890 OTHER SPECIFIED POSTPROCEDURAL STATES: Chronic | ICD-10-CM

## 2018-11-02 RX ORDER — OMEPRAZOLE 20 MG/1
20 CAPSULE, DELAYED RELEASE ORAL DAILY
Qty: 30 | Refills: 5 | Status: DISCONTINUED | COMMUNITY
Start: 2018-09-06 | End: 2018-11-02

## 2018-11-02 NOTE — HISTORY OF PRESENT ILLNESS
[de-identified] : 49 yo  F presents for routine follow up  Patient is s/p Lumbar Fusion L5-S1 in May. She reports that she now has a different type of pain in her back. She is following with pain management who changed her pain medication to Percocet TID, Gabapentin 800 mg, and fentanyl patch. Currently patient endorses that he pain regimen is able to control her pain, she missed her noon dose due to needing to come to the clinic. currently she endorses 10/10 pain in her lower back radiating to her right leg. A Patient also states that at timed her right lower extremity is very cold along with pain and swelling. Currently she denies any swelling states that at times it is hard for her foot \par  patient also has recently seen GI c/o 4 to 6 month PTP when she reports having epigastric , sharp , stabbing pain, not related to food, not relieved by PPI, and worse with inspiration. Patient also reports constipation, that she takes laxative over the counter. Patient denies any RBPR, hematemesis, melena, diarrhea, weight loss, family hx of colon or gastric ca.  \par Denies other symptoms s/a fever/chills/cp/sob/urinary or bowel symptoms. \par

## 2018-11-02 NOTE — ASSESSMENT
[FreeTextEntry1] : #chronic back pain s/p lumbar fusion\par -pt follows with pain management\par - c/w pain regiment as directed by pain management\par - c/w physical therapy\par - still on fentanyl patch, Percocet tabs being weaned\par - still complains of tingling today but states that gabapentin was not taken today and it usually helps\par \par #right lower extremity swelling, claudication, and pareshtesias\par -check a1c\par -b12, wnl\par -check arterial duplex of b/l LE\par \par \par #depression\par -c/w escitalopram\par \par #GERD/Abdominal pain\par c/w omeprazole 40\par seen by gi- patient will have EGD\par \par #HCM\par HPV neg 10/2017, mammo birads 2 9/2017 - \par screening mammogram ordered\par RTC in 3 months. \par check cbc, BMP, lipid, A1c

## 2018-11-05 ENCOUNTER — OUTPATIENT (OUTPATIENT)
Dept: OUTPATIENT SERVICES | Facility: HOSPITAL | Age: 48
LOS: 1 days | Discharge: HOME | End: 2018-11-05

## 2018-11-05 DIAGNOSIS — M54.9 DORSALGIA, UNSPECIFIED: ICD-10-CM

## 2018-11-05 DIAGNOSIS — M54.5 LOW BACK PAIN: ICD-10-CM

## 2018-11-05 DIAGNOSIS — Z98.890 OTHER SPECIFIED POSTPROCEDURAL STATES: Chronic | ICD-10-CM

## 2018-11-06 LAB
ALBUMIN SERPL ELPH-MCNC: 4.4 G/DL
ALBUMIN SERPL ELPH-MCNC: 4.5 G/DL
ALP BLD-CCNC: 126 U/L
ALP BLD-CCNC: 127 U/L
ALT SERPL-CCNC: 25 U/L
ALT SERPL-CCNC: 25 U/L
ANION GAP SERPL CALC-SCNC: 14 MMOL/L
ANION GAP SERPL CALC-SCNC: 16 MMOL/L
AST SERPL-CCNC: 10 U/L
AST SERPL-CCNC: 11 U/L
BASOPHILS # BLD AUTO: 0.02 K/UL
BASOPHILS NFR BLD AUTO: 0.3 %
BILIRUB SERPL-MCNC: 0.3 MG/DL
BILIRUB SERPL-MCNC: 0.3 MG/DL
BUN SERPL-MCNC: 8 MG/DL
BUN SERPL-MCNC: 9 MG/DL
CALCIUM SERPL-MCNC: 9.5 MG/DL
CALCIUM SERPL-MCNC: 9.5 MG/DL
CHLORIDE SERPL-SCNC: 102 MMOL/L
CHLORIDE SERPL-SCNC: 103 MMOL/L
CHOLEST SERPL-MCNC: 193 MG/DL
CHOLEST/HDLC SERPL: 3.1 RATIO
CO2 SERPL-SCNC: 25 MMOL/L
CO2 SERPL-SCNC: 27 MMOL/L
CREAT SERPL-MCNC: 0.5 MG/DL
CREAT SERPL-MCNC: 0.5 MG/DL
EOSINOPHIL # BLD AUTO: 0.03 K/UL
EOSINOPHIL NFR BLD AUTO: 0.5 %
ESTIMATED AVERAGE GLUCOSE: 120 MG/DL
GLUCOSE SERPL-MCNC: 96 MG/DL
GLUCOSE SERPL-MCNC: 96 MG/DL
HBA1C MFR BLD HPLC: 5.8 %
HCT VFR BLD CALC: 39.3 %
HDLC SERPL-MCNC: 62 MG/DL
HGB BLD-MCNC: 12.9 G/DL
IMM GRANULOCYTES NFR BLD AUTO: 0.2 %
LDLC SERPL CALC-MCNC: 125 MG/DL
LYMPHOCYTES # BLD AUTO: 2.63 K/UL
LYMPHOCYTES NFR BLD AUTO: 42.9 %
MAN DIFF?: NORMAL
MCHC RBC-ENTMCNC: 29 PG
MCHC RBC-ENTMCNC: 32.8 G/DL
MCV RBC AUTO: 88.3 FL
MONOCYTES # BLD AUTO: 0.26 K/UL
MONOCYTES NFR BLD AUTO: 4.2 %
NEUTROPHILS # BLD AUTO: 3.18 K/UL
NEUTROPHILS NFR BLD AUTO: 51.9 %
PLATELET # BLD AUTO: 235 K/UL
POTASSIUM SERPL-SCNC: 4.4 MMOL/L
POTASSIUM SERPL-SCNC: 4.7 MMOL/L
PROT SERPL-MCNC: 7.4 G/DL
PROT SERPL-MCNC: 7.6 G/DL
RBC # BLD: 4.45 M/UL
RBC # FLD: 12.9 %
SODIUM SERPL-SCNC: 143 MMOL/L
SODIUM SERPL-SCNC: 144 MMOL/L
TRIGL SERPL-MCNC: 109 MG/DL
WBC # FLD AUTO: 6.13 K/UL

## 2018-11-12 ENCOUNTER — EMERGENCY (EMERGENCY)
Facility: HOSPITAL | Age: 48
LOS: 0 days | Discharge: HOME | End: 2018-11-12
Attending: EMERGENCY MEDICINE | Admitting: EMERGENCY MEDICINE

## 2018-11-12 VITALS
RESPIRATION RATE: 18 BRPM | OXYGEN SATURATION: 99 % | TEMPERATURE: 98 F | HEART RATE: 86 BPM | DIASTOLIC BLOOD PRESSURE: 81 MMHG | SYSTOLIC BLOOD PRESSURE: 138 MMHG

## 2018-11-12 DIAGNOSIS — Z98.890 OTHER SPECIFIED POSTPROCEDURAL STATES: Chronic | ICD-10-CM

## 2018-11-12 DIAGNOSIS — N39.0 URINARY TRACT INFECTION, SITE NOT SPECIFIED: ICD-10-CM

## 2018-11-12 DIAGNOSIS — R35.0 FREQUENCY OF MICTURITION: ICD-10-CM

## 2018-11-12 DIAGNOSIS — R10.9 UNSPECIFIED ABDOMINAL PAIN: ICD-10-CM

## 2018-11-12 LAB
APPEARANCE UR: ABNORMAL
BILIRUB UR-MCNC: NEGATIVE — SIGNIFICANT CHANGE UP
COLOR SPEC: YELLOW — SIGNIFICANT CHANGE UP
DIFF PNL FLD: ABNORMAL
EPI CELLS # UR: ABNORMAL /HPF
GLUCOSE UR QL: NEGATIVE MG/DL — SIGNIFICANT CHANGE UP
KETONES UR-MCNC: NEGATIVE — SIGNIFICANT CHANGE UP
LEUKOCYTE ESTERASE UR-ACNC: ABNORMAL
NITRITE UR-MCNC: NEGATIVE — SIGNIFICANT CHANGE UP
PH UR: 8 — SIGNIFICANT CHANGE UP (ref 5–8)
PROT UR-MCNC: ABNORMAL MG/DL
SP GR SPEC: 1.01 — SIGNIFICANT CHANGE UP (ref 1.01–1.03)
UROBILINOGEN FLD QL: 0.2 MG/DL — SIGNIFICANT CHANGE UP (ref 0.2–0.2)
WBC UR QL: ABNORMAL /HPF

## 2018-11-12 RX ORDER — NITROFURANTOIN MACROCRYSTAL 50 MG
1 CAPSULE ORAL
Qty: 14 | Refills: 0 | OUTPATIENT
Start: 2018-11-12 | End: 2018-11-18

## 2018-11-12 NOTE — ED PROVIDER NOTE - NS ED ROS FT
General: No fevers, chills, nausea, vomiting  Eyes:  No visual changes, eye pain or discharge.  ENMT:  No hearing changes, pain,  Cardiac:  No chest pain, SOB or edema.  Respiratory:  No cough or respiratory distress.   GI:  No nausea, vomiting, diarrhea or abdominal pain.  :  +dysuria, +frequency   MS:  No muscle weakness  Neuro:  No headache or weakness.  No LOC.  Skin:  No skin rash.   Endocrine: No history of thyroid disease or diabetes.

## 2018-11-12 NOTE — ED PROVIDER NOTE - PHYSICAL EXAMINATION
CONSTITUTIONAL: Well-developed; well-nourished; in no acute distress, speaking in full sentences  SKIN: warm, dry  HEAD: Normocephalic; atraumatic  EYES: PERRL, EOMI, no conjunctival erythema  ENT: No nasal discharge; airway clear, mucous membranes moist  NECK: Supple; non tender, FROM  CARD: +S1, S2 no murmurs, gallops, or rubs. Regular rate and rhythm. radial 2+  RESP: No wheezes, rales or rhonchi. CTABL  ABD: soft ntnd, no rebound, no guarding, no rigidity, no cva tenderness  EXT: moves all extremities, ambulates wo assistance No clubbing, cyanosis or edema.   NEURO: Alert, oriented, grossly unremarkable, no focal deficits, cn ii-xii grossly intact  PSYCH: Cooperative, appropriate

## 2018-11-12 NOTE — ED PROVIDER NOTE - OBJECTIVE STATEMENT
47yo f pmhx chronic back pain presents CC 3 days of dysuria, urinary urgency and frequency. no hematuria. no abdominal pain, no new or changed back pain. no fevers, nausea, vomiting.

## 2018-11-12 NOTE — ED PROVIDER NOTE - PROGRESS NOTE DETAILS
results reviewed, pt reevaluated, abx sent to pts pharmacy. Patient to be discharged from ED. Any available test results were discussed with patient and/or family. Verbal instructions given, including instructions to return to ED immediately for any new, worsening, or concerning symptoms. Patient endorsed understanding. Written discharge instructions additionally given, including follow-up plan. Attending Note: I personally evaluated the patient. I reviewed the Resident’s note (as assigned above), and agree with the findings and plan except as documented in my note.  49 y/o F with SX consistent for UTI. No f/v. PE: pt is a non-toxic F, exam as noted. Plan: UA and reassess.

## 2018-11-12 NOTE — ED PROVIDER NOTE - NSFOLLOWUPINSTRUCTIONS_ED_ALL_ED_FT
follow up with your primary care doctor in 1-2 days    Urinary Tract Infection    A urinary tract infection (UTI) is an infection of any part of the urinary tract, which includes the kidneys, ureters, bladder, and urethra. Risk factors include ignoring your need to urinate, wiping back to front if female, being an uncircumcised male, and having diabetes or a weak immune system. Symptoms include frequent urination, pain or burning with urination, foul smelling urine, cloudy urine, pain in the lower abdomen, blood in the urine, and fever. If you were prescribed an antibiotic medicine, take it as told by your health care provider. Do not stop taking the antibiotic even if you start to feel better.    SEEK IMMEDIATE MEDICAL CARE IF YOU HAVE ANY OF THE FOLLOWING SYMPTOMS: severe back or abdominal pain, fever, inability to keep fluids or medicine down, dizziness/lightheadedness, or a change in mental status.

## 2018-11-13 LAB
CULTURE RESULTS: NO GROWTH — SIGNIFICANT CHANGE UP
SPECIMEN SOURCE: SIGNIFICANT CHANGE UP

## 2018-11-15 ENCOUNTER — RESULT REVIEW (OUTPATIENT)
Age: 48
End: 2018-11-15

## 2018-11-15 ENCOUNTER — OUTPATIENT (OUTPATIENT)
Dept: OUTPATIENT SERVICES | Facility: HOSPITAL | Age: 48
LOS: 1 days | Discharge: HOME | End: 2018-11-15

## 2018-11-15 VITALS
SYSTOLIC BLOOD PRESSURE: 143 MMHG | TEMPERATURE: 98 F | HEART RATE: 78 BPM | WEIGHT: 149.91 LBS | DIASTOLIC BLOOD PRESSURE: 89 MMHG | HEIGHT: 60 IN | RESPIRATION RATE: 18 BRPM

## 2018-11-15 VITALS — DIASTOLIC BLOOD PRESSURE: 78 MMHG | SYSTOLIC BLOOD PRESSURE: 107 MMHG | HEART RATE: 65 BPM | RESPIRATION RATE: 18 BRPM

## 2018-11-15 DIAGNOSIS — Z98.890 OTHER SPECIFIED POSTPROCEDURAL STATES: Chronic | ICD-10-CM

## 2018-11-15 RX ORDER — CELECOXIB 200 MG/1
200 CAPSULE ORAL ONCE
Qty: 0 | Refills: 0 | Status: DISCONTINUED | OUTPATIENT
Start: 2018-11-15 | End: 2018-11-30

## 2018-11-15 RX ORDER — TIZANIDINE 4 MG/1
2 TABLET ORAL
Qty: 0 | Refills: 0 | COMMUNITY

## 2018-11-15 RX ORDER — ONDANSETRON 8 MG/1
4 TABLET, FILM COATED ORAL ONCE
Qty: 0 | Refills: 0 | Status: DISCONTINUED | OUTPATIENT
Start: 2018-11-15 | End: 2018-11-30

## 2018-11-15 RX ORDER — FLUTICASONE PROPIONATE 220 MCG
0 AEROSOL WITH ADAPTER (GRAM) INHALATION
Qty: 0 | Refills: 0 | COMMUNITY

## 2018-11-15 NOTE — ASU DISCHARGE PLAN (ADULT/PEDIATRIC). - NOTIFY
Pain not relieved by Medications/Fever greater than 101/Persistent Nausea and Vomiting/Excessive Diarrhea/Inability to Tolerate Liquids or Foods/Bleeding that does not stop

## 2018-11-15 NOTE — CHART NOTE - NSCHARTNOTEFT_GEN_A_CORE
PACU ANESTHESIA ADMISSION NOTE      Procedure:   Post op diagnosis:      ____  Intubated  TV:______       Rate: ______      FiO2: ______    _x___  Patent Airway    _x___  Full return of protective reflexes    _x___  Full recovery from anesthesia / back to baseline status    Vitals:  T(C): 36.7  HR: 84   BP: 92/53  RR: 18   SpO2: 100%     Mental Status:  _x___ Awake   _____ Alert   _____ Drowsy   _____ Sedated    Nausea/Vomiting:  _x___  NO       ______Yes,   See Post - Op Orders         Pain Scale (0-10):  __0___    Treatment: _x___ None    ____ See Post - Op/PCA Orders    Post - Operative Fluids:   __x__ Oral   ____ See Post - Op Orders    Plan: Discharge:   _x ___Home       _____Floor     _____Critical Care    _____  Other:_________________    Comments:  No anesthesia issues or complications noted.  Discharge when criteria met. PACU ANESTHESIA ADMISSION NOTE      Procedure: EGD  Post op diagnosis:  Gastritis    ____  Intubated  TV:______       Rate: ______      FiO2: ______    _x___  Patent Airway    _x___  Full return of protective reflexes    _x___  Full recovery from anesthesia / back to baseline status    Vitals:  T(C): 36.7  HR: 84   BP: 92/53  RR: 18   SpO2: 100%     Mental Status:  _x___ Awake   _____ Alert   _____ Drowsy   _____ Sedated    Nausea/Vomiting:  _x___  NO       ______Yes,   See Post - Op Orders         Pain Scale (0-10):  __0___    Treatment: _x___ None    ____ See Post - Op/PCA Orders    Post - Operative Fluids:   __x__ Oral   ____ See Post - Op Orders    Plan: Discharge:   _x ___Home       _____Floor     _____Critical Care    _____  Other:_________________    Comments:  No anesthesia issues or complications noted.  Discharge when criteria met.

## 2018-11-15 NOTE — H&P ADULT - NSHPPHYSICALEXAM_GEN_ALL_CORE
PHYSICAL EXAM:   Vital Signs:  Vital Signs Last 24 Hrs  T(C): 36.7 (15 Nov 2018 07:26), Max: 36.7 (15 Nov 2018 07:14)  T(F): 98.1 (15 Nov 2018 07:14), Max: 98.1 (15 Nov 2018 07:14)  HR: 78 (15 Nov 2018 07:26) (78 - 78)  BP: 143/89 (15 Nov 2018 07:26) (143/89 - 143/89)  BP(mean): --  RR: 18 (15 Nov 2018 07:26) (18 - 18)  SpO2: --  Daily Height in cm: 152.4 (15 Nov 2018 07:26)    Daily     GENERAL:  Appears stated age, well-groomed, well-nourished, no distress  HEENT:  NC/AT,  conjunctivae clear and pink, no thyromegaly, nodules, adenopathy, no JVD, sclera -anicteric  CHEST:  Full & symmetric excursion, no increased effort, breath sounds clear  HEART:  Regular rhythm, S1, S2, no murmur/rub/S3/S4, no abdominal bruit, no edema  ABDOMEN:  Soft, non-tender, non-distended, normoactive bowel sounds,  no masses ,no hepato-splenomegaly, no signs of chronic liver disease  EXTEREMITIES:  no cyanosis,clubbing or edema  SKIN:  No rash/erythema/ecchymoses/petechiae/wounds/abscess/warm/dry  NEURO:  Alert, oriented, no asterixis, no tremor, no encephalopathy

## 2018-11-16 LAB — SURGICAL PATHOLOGY STUDY: SIGNIFICANT CHANGE UP

## 2018-11-20 DIAGNOSIS — F33.2 MAJOR DEPRESSIVE DISORDER, RECURRENT SEVERE WITHOUT PSYCHOTIC FEATURES: ICD-10-CM

## 2018-11-20 DIAGNOSIS — F43.10 POST-TRAUMATIC STRESS DISORDER, UNSPECIFIED: ICD-10-CM

## 2018-11-20 DIAGNOSIS — K83.8 OTHER SPECIFIED DISEASES OF BILIARY TRACT: ICD-10-CM

## 2018-11-20 DIAGNOSIS — K21.9 GASTRO-ESOPHAGEAL REFLUX DISEASE WITHOUT ESOPHAGITIS: ICD-10-CM

## 2018-11-20 DIAGNOSIS — R10.13 EPIGASTRIC PAIN: ICD-10-CM

## 2018-11-20 DIAGNOSIS — K29.80 DUODENITIS WITHOUT BLEEDING: ICD-10-CM

## 2018-11-20 DIAGNOSIS — M54.9 DORSALGIA, UNSPECIFIED: ICD-10-CM

## 2018-11-20 DIAGNOSIS — E55.9 VITAMIN D DEFICIENCY, UNSPECIFIED: ICD-10-CM

## 2018-11-20 DIAGNOSIS — F41.9 ANXIETY DISORDER, UNSPECIFIED: ICD-10-CM

## 2018-11-20 DIAGNOSIS — J45.909 UNSPECIFIED ASTHMA, UNCOMPLICATED: ICD-10-CM

## 2018-11-20 DIAGNOSIS — K29.50 UNSPECIFIED CHRONIC GASTRITIS WITHOUT BLEEDING: ICD-10-CM

## 2018-11-20 DIAGNOSIS — Z90.49 ACQUIRED ABSENCE OF OTHER SPECIFIED PARTS OF DIGESTIVE TRACT: ICD-10-CM

## 2018-11-20 DIAGNOSIS — G89.29 OTHER CHRONIC PAIN: ICD-10-CM

## 2018-11-20 DIAGNOSIS — F32.9 MAJOR DEPRESSIVE DISORDER, SINGLE EPISODE, UNSPECIFIED: ICD-10-CM

## 2018-12-03 ENCOUNTER — FORM ENCOUNTER (OUTPATIENT)
Age: 48
End: 2018-12-03

## 2018-12-04 ENCOUNTER — OUTPATIENT (OUTPATIENT)
Dept: OUTPATIENT SERVICES | Facility: HOSPITAL | Age: 48
LOS: 1 days | Discharge: HOME | End: 2018-12-04
Payer: MEDICAID

## 2018-12-04 DIAGNOSIS — Z98.890 OTHER SPECIFIED POSTPROCEDURAL STATES: Chronic | ICD-10-CM

## 2018-12-04 DIAGNOSIS — M96.1 POSTLAMINECTOMY SYNDROME, NOT ELSEWHERE CLASSIFIED: ICD-10-CM

## 2018-12-04 DIAGNOSIS — M50.01 CERVICAL DISC DISORDER WITH MYELOPATHY, HIGH CERVICAL REGION: ICD-10-CM

## 2018-12-04 DIAGNOSIS — M51.14 INTERVERTEBRAL DISC DISORDERS WITH RADICULOPATHY, THORACIC REGION: ICD-10-CM

## 2018-12-04 PROCEDURE — 93925 LOWER EXTREMITY STUDY: CPT | Mod: 26

## 2018-12-05 ENCOUNTER — FORM ENCOUNTER (OUTPATIENT)
Age: 48
End: 2018-12-05

## 2018-12-06 ENCOUNTER — OUTPATIENT (OUTPATIENT)
Dept: OUTPATIENT SERVICES | Facility: HOSPITAL | Age: 48
LOS: 1 days | Discharge: HOME | End: 2018-12-06

## 2018-12-06 DIAGNOSIS — Z98.890 OTHER SPECIFIED POSTPROCEDURAL STATES: Chronic | ICD-10-CM

## 2018-12-06 DIAGNOSIS — Z12.31 ENCOUNTER FOR SCREENING MAMMOGRAM FOR MALIGNANT NEOPLASM OF BREAST: ICD-10-CM

## 2018-12-10 ENCOUNTER — OUTPATIENT (OUTPATIENT)
Dept: OUTPATIENT SERVICES | Facility: HOSPITAL | Age: 48
LOS: 1 days | Discharge: HOME | End: 2018-12-10

## 2018-12-10 DIAGNOSIS — Z98.890 OTHER SPECIFIED POSTPROCEDURAL STATES: Chronic | ICD-10-CM

## 2018-12-10 DIAGNOSIS — M43.26 FUSION OF SPINE, LUMBAR REGION: ICD-10-CM

## 2018-12-10 DIAGNOSIS — M54.5 LOW BACK PAIN: ICD-10-CM

## 2018-12-18 ENCOUNTER — OUTPATIENT (OUTPATIENT)
Dept: OUTPATIENT SERVICES | Facility: HOSPITAL | Age: 48
LOS: 1 days | Discharge: HOME | End: 2018-12-18

## 2018-12-18 DIAGNOSIS — M51.14 INTERVERTEBRAL DISC DISORDERS WITH RADICULOPATHY, THORACIC REGION: ICD-10-CM

## 2018-12-18 DIAGNOSIS — M96.1 POSTLAMINECTOMY SYNDROME, NOT ELSEWHERE CLASSIFIED: ICD-10-CM

## 2018-12-18 DIAGNOSIS — Z98.890 OTHER SPECIFIED POSTPROCEDURAL STATES: Chronic | ICD-10-CM

## 2019-01-01 ENCOUNTER — EMERGENCY (EMERGENCY)
Facility: HOSPITAL | Age: 49
LOS: 0 days | Discharge: HOME | End: 2019-01-01
Admitting: PHYSICIAN ASSISTANT

## 2019-01-01 VITALS
RESPIRATION RATE: 18 BRPM | TEMPERATURE: 99 F | HEART RATE: 88 BPM | DIASTOLIC BLOOD PRESSURE: 94 MMHG | SYSTOLIC BLOOD PRESSURE: 133 MMHG | OXYGEN SATURATION: 99 %

## 2019-01-01 DIAGNOSIS — Z98.890 OTHER SPECIFIED POSTPROCEDURAL STATES: Chronic | ICD-10-CM

## 2019-01-01 DIAGNOSIS — R20.0 ANESTHESIA OF SKIN: ICD-10-CM

## 2019-01-01 DIAGNOSIS — M54.2 CERVICALGIA: ICD-10-CM

## 2019-01-01 DIAGNOSIS — Z79.51 LONG TERM (CURRENT) USE OF INHALED STEROIDS: ICD-10-CM

## 2019-01-01 DIAGNOSIS — Z79.899 OTHER LONG TERM (CURRENT) DRUG THERAPY: ICD-10-CM

## 2019-01-01 DIAGNOSIS — M54.9 DORSALGIA, UNSPECIFIED: ICD-10-CM

## 2019-01-01 DIAGNOSIS — R20.2 PARESTHESIA OF SKIN: ICD-10-CM

## 2019-01-01 NOTE — ED ADULT TRIAGE NOTE - CHIEF COMPLAINT QUOTE
pt had sx on back in may,  c/o numbness to left 1st 2nd and 3rd finger and pins and needles to bilat feet. + dysuria

## 2019-01-01 NOTE — ED PROVIDER NOTE - NSFOLLOWUPINSTRUCTIONS_ED_ALL_ED_FT
Follow up with your pain management as an outpatient.  If you develop new symptoms or worsening symptoms return for reevaluation

## 2019-01-01 NOTE — ED PROVIDER NOTE - NS ED ROS FT
Review of Systems    Constitutional: (-) fever  Eyes/ENT: (-) blurry vision, (-) epistaxis  Cardiovascular: (-) chest pain, (-) syncope  Respiratory: (-) cough  Musculoskeletal: (+) neck pain, (+) back pain  Integumentary: (-) rash  Neurological: (-) headache, (-) altered mental status

## 2019-01-01 NOTE — ED PROVIDER NOTE - OBJECTIVE STATEMENT
Patient is a 48 years old F presents to the ED for evaluation of tingling sensation to left 1st-3rd digit. No headache, no visual changes, no cp, no shortness of breath.

## 2019-01-01 NOTE — ED PROVIDER NOTE - PHYSICAL EXAMINATION
Gen: Alert, NAD, well appearing  Head: NC, AT, PERRL, EOMI, normal lids/conjunctiva  ENT: normal hearing, patent oropharynx without erythema  Neck: +supple, no midline tenderness  Pulm: Bilateral BS, normal resp effort, no wheeze/stridor/retractions  CV: RRR  Abd: soft, NT/ND  Neuro: AAOx3, sensation intact, strength upper extremities bilaterally equal

## 2019-01-01 NOTE — ED ADULT NURSE NOTE - OBJECTIVE STATEMENT
pt complaining of intermittent pins and needles numbness and tingling to fingers and toes, no previous treatment.

## 2019-01-04 ENCOUNTER — OUTPATIENT (OUTPATIENT)
Dept: OUTPATIENT SERVICES | Facility: HOSPITAL | Age: 49
LOS: 1 days | Discharge: HOME | End: 2019-01-04

## 2019-01-04 DIAGNOSIS — R07.9 CHEST PAIN, UNSPECIFIED: ICD-10-CM

## 2019-01-04 DIAGNOSIS — Z98.890 OTHER SPECIFIED POSTPROCEDURAL STATES: Chronic | ICD-10-CM

## 2019-01-04 DIAGNOSIS — R05 COUGH: ICD-10-CM

## 2019-01-08 ENCOUNTER — APPOINTMENT (OUTPATIENT)
Dept: NEUROLOGY | Facility: CLINIC | Age: 49
End: 2019-01-08

## 2019-01-08 ENCOUNTER — OUTPATIENT (OUTPATIENT)
Dept: OUTPATIENT SERVICES | Facility: HOSPITAL | Age: 49
LOS: 1 days | Discharge: HOME | End: 2019-01-08

## 2019-01-08 DIAGNOSIS — Z98.890 OTHER SPECIFIED POSTPROCEDURAL STATES: Chronic | ICD-10-CM

## 2019-01-08 DIAGNOSIS — R20.0 ANESTHESIA OF SKIN: ICD-10-CM

## 2019-01-08 NOTE — DISCUSSION/SUMMARY
[FreeTextEntry1] : Patient was previously seen for memory loss and advised to discontinue the multiple meds which may be causing memory impairment but she has not changed any of them.  She has cognitive impairment on exam today, reports being treated for depression and states psychiatrist says her depression is under good control.\par \par She c/o burning and pain in hands and feet.  She does have tinels at wrists. \par Will order EMG/NCS of left arm and leg to assess for CTS and neuropathy.\par \par Advised patient to reduce dose of pain meds to see if this helps improve her memory.\par \par She will return in 6 months.

## 2019-01-08 NOTE — ASSESSMENT
[FreeTextEntry1] : 49 yo w/ chronic neck and LBP currently with forgetfulness currently with no evidence of dementia. Suspect symptoms are secondary to polypharmacy vs. pseudodementia from depression.\par \par Forgetfulness:\par   - d/c trazodone and tizanidine\par   - if able, d/c benadryl and neurontin as well.\par   - Follow-up with pain management.\par \par Neuropathy:\par   - EMG testing\par \par Return to clinic in 6 months
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
## 2019-01-17 ENCOUNTER — OUTPATIENT (OUTPATIENT)
Dept: OUTPATIENT SERVICES | Facility: HOSPITAL | Age: 49
LOS: 1 days | Discharge: HOME | End: 2019-01-17

## 2019-01-17 DIAGNOSIS — Z98.890 OTHER SPECIFIED POSTPROCEDURAL STATES: Chronic | ICD-10-CM

## 2019-01-17 DIAGNOSIS — M54.2 CERVICALGIA: ICD-10-CM

## 2019-01-18 ENCOUNTER — RX RENEWAL (OUTPATIENT)
Age: 49
End: 2019-01-18

## 2019-02-15 ENCOUNTER — APPOINTMENT (OUTPATIENT)
Dept: INTERNAL MEDICINE | Facility: CLINIC | Age: 49
End: 2019-02-15

## 2019-02-15 ENCOUNTER — OUTPATIENT (OUTPATIENT)
Dept: OUTPATIENT SERVICES | Facility: HOSPITAL | Age: 49
LOS: 1 days | Discharge: HOME | End: 2019-02-15

## 2019-02-15 VITALS
WEIGHT: 143 LBS | DIASTOLIC BLOOD PRESSURE: 80 MMHG | BODY MASS INDEX: 28.83 KG/M2 | HEART RATE: 66 BPM | SYSTOLIC BLOOD PRESSURE: 129 MMHG | TEMPERATURE: 98.4 F | HEIGHT: 59 IN

## 2019-02-15 DIAGNOSIS — Z98.890 OTHER SPECIFIED POSTPROCEDURAL STATES: Chronic | ICD-10-CM

## 2019-02-15 RX ORDER — OMEPRAZOLE 40 MG/1
40 CAPSULE, DELAYED RELEASE ORAL TWICE DAILY
Qty: 30 | Refills: 3 | Status: DISCONTINUED | COMMUNITY
Start: 2018-05-22 | End: 2019-02-15

## 2019-02-15 NOTE — REVIEW OF SYSTEMS
[Heartburn] : heartburn [Joint Pain] : joint pain [Joint Stiffness] : joint stiffness [Muscle Pain] : muscle pain [Back Pain] : back pain [Negative] : Heme/Lymph

## 2019-02-15 NOTE — ASSESSMENT
[FreeTextEntry1] : 49 year old lasy with chronic pain, GERD presenting for follow up examination\par \par #chronic back pain s/p lumbar fusion\par - pt follows with pain management\par - c/w pain regiment as directed by pain management\par - completed physical therapy\par - still on fentanyl patch, Percocet tabs being weaned\par - still complains of tingling today but states that gabapentin was not taken today and it usually helps\par -referral for PT\par \par #right lower extremity swelling, claudication, and pareshtesias\par -a1c 5.8%\par -b12, wnl\par -arterial duplex of b/l LE WNL\par \par #depression - persistent Sx\par -c/w escitalopram\par -refer to psychiatrist (MD) to evaluate persistent Sx \par \par #GERD/Abdominal pain\par c/w omeprazole 40\par seen by gi- patient will have EGD\par \par #HCM\par HPV neg 10/2017\par screening mammogram(12/2018): BIRADS 2\par RTC in 3 months. \par \par

## 2019-02-15 NOTE — HISTORY OF PRESENT ILLNESS
[Family Member] : family member [FreeTextEntry1] : follow up [de-identified] : 47 yo F presents for routine follow up Patient is s/p Lumbar Fusion L5-S1 in May. She reports that she now has a different type of pain in her back. She is following with pain management who changed her pain medication to Percocet TID, Gabapentin 800 mg, and fentanyl patch. Currently patient endorses that he pain regimen is able to control her pain, she missed her noon dose due to needing to come to the clinic. currently she endorses 10/10 pain in her lower back radiating to her right leg. A Patient also states that at timed her right lower extremity is very cold along with pain and swelling. Currently she denies any swelling states that at times it is hard for her foot \par  patient also has recently seen GI c/o 4 to 6 month PTP when she reports having epigastric , sharp , stabbing pain, not related to food, not relieved by PPI, and worse with inspiration. Patient also reports constipation, that she takes laxative over the counter. Patient denies any RBPR, hematemesis, melena, diarrhea, weight loss, family hx of colon or gastric ca.  Denies other symptoms. Denied fever/chills/cp/sob/urinary or bowel symptoms. \par \par She went to neurology and they plan to do an EMG

## 2019-02-22 ENCOUNTER — RX RENEWAL (OUTPATIENT)
Age: 49
End: 2019-02-22

## 2019-02-22 ENCOUNTER — OUTPATIENT (OUTPATIENT)
Dept: OUTPATIENT SERVICES | Facility: HOSPITAL | Age: 49
LOS: 1 days | Discharge: HOME | End: 2019-02-22

## 2019-02-22 DIAGNOSIS — Z98.890 OTHER SPECIFIED POSTPROCEDURAL STATES: Chronic | ICD-10-CM

## 2019-02-22 DIAGNOSIS — M54.5 LOW BACK PAIN: ICD-10-CM

## 2019-02-22 DIAGNOSIS — G89.29 OTHER CHRONIC PAIN: ICD-10-CM

## 2019-02-22 DIAGNOSIS — M54.9 DORSALGIA, UNSPECIFIED: ICD-10-CM

## 2019-03-01 ENCOUNTER — OUTPATIENT (OUTPATIENT)
Dept: OUTPATIENT SERVICES | Facility: HOSPITAL | Age: 49
LOS: 1 days | End: 2019-03-01
Payer: MEDICAID

## 2019-03-01 DIAGNOSIS — Z98.890 OTHER SPECIFIED POSTPROCEDURAL STATES: Chronic | ICD-10-CM

## 2019-03-01 PROCEDURE — G9001: CPT

## 2019-03-18 DIAGNOSIS — Z71.89 OTHER SPECIFIED COUNSELING: ICD-10-CM

## 2019-04-05 ENCOUNTER — APPOINTMENT (OUTPATIENT)
Dept: OBGYN | Facility: CLINIC | Age: 49
End: 2019-04-05

## 2019-04-05 ENCOUNTER — OUTPATIENT (OUTPATIENT)
Dept: OUTPATIENT SERVICES | Facility: HOSPITAL | Age: 49
LOS: 1 days | Discharge: HOME | End: 2019-04-05

## 2019-04-05 VITALS
SYSTOLIC BLOOD PRESSURE: 120 MMHG | HEIGHT: 59 IN | BODY MASS INDEX: 29.11 KG/M2 | DIASTOLIC BLOOD PRESSURE: 60 MMHG | WEIGHT: 144.38 LBS

## 2019-04-05 DIAGNOSIS — Z98.890 OTHER SPECIFIED POSTPROCEDURAL STATES: Chronic | ICD-10-CM

## 2019-04-05 DIAGNOSIS — Z01.419 ENCOUNTER FOR GYNECOLOGICAL EXAMINATION (GENERAL) (ROUTINE) WITHOUT ABNORMAL FINDINGS: ICD-10-CM

## 2019-04-05 DIAGNOSIS — Z70.8 OTHER SEX COUNSELING: ICD-10-CM

## 2019-04-05 NOTE — HISTORY OF PRESENT ILLNESS
[___ Month(s) Ago] : [unfilled] month(s) ago [Fair] : being in fair health [Postmenopausal] : is postmenopausal [HPV Vaccine NA Due to Age] : HPV vaccine not available to patient due to age [Hot Flashes] : no hot flashes [Night Sweats] : no night sweats [Vaginal Itching] : no vaginal itching [Dyspareunia] : no dyspareunia [Mood Changes] : no mood changes [Definite:  ___ (Date)] : the last menstrual period was [unfilled] [Monogamous] : is monogamous

## 2019-04-05 NOTE — PHYSICAL EXAM
[Normal Appearance] : was normal in appearance [Neck Supple] : was supple [No Tracheal Deviation] : the trachea was midline [None] : there were no thyroid nodules [Examination Of The Breasts] : a normal appearance [No Discharge] : no discharge [No Masses] : no breast masses were palpable [Normal] : uterus [Pap Obtained] : a Pap smear was performed [Retroversion] : retroverted [RRR, No Murmurs] : RRR, no murmurs [CTAB] : CTAB [FreeTextEntry6] : no palpable masses [FreeTextEntry9] : deferred

## 2019-04-11 LAB
C TRACH RRNA SPEC QL NAA+PROBE: NOT DETECTED
ESTRADIOL SERPL-MCNC: <5 PG/ML
FSH SERPL-MCNC: 53.9 IU/L
HBV SURFACE AG SER QL: NONREACTIVE
HIV1+2 AB SPEC QL IA.RAPID: NONREACTIVE
N GONORRHOEA RRNA SPEC QL NAA+PROBE: NOT DETECTED
RPR SER-TITR: NORMAL
SOURCE AMPLIFICATION: NORMAL

## 2019-04-12 LAB
HCV RNA SERPL NAA DL=5-ACNC: NOT DETECTED IU/ML
HCV RNA SERPL NAA+PROBE-LOG IU: NOT DETECTED LOGIU/ML

## 2019-04-17 LAB — HPV HIGH+LOW RISK DNA PNL CVX: NOT DETECTED

## 2019-04-23 ENCOUNTER — RX RENEWAL (OUTPATIENT)
Age: 49
End: 2019-04-23

## 2019-04-26 ENCOUNTER — RX RENEWAL (OUTPATIENT)
Age: 49
End: 2019-04-26

## 2019-05-03 ENCOUNTER — OTHER (OUTPATIENT)
Age: 49
End: 2019-05-03

## 2019-05-09 ENCOUNTER — OUTPATIENT (OUTPATIENT)
Dept: OUTPATIENT SERVICES | Facility: HOSPITAL | Age: 49
LOS: 1 days | Discharge: HOME | End: 2019-05-09

## 2019-05-09 DIAGNOSIS — M51.14 INTERVERTEBRAL DISC DISORDERS WITH RADICULOPATHY, THORACIC REGION: ICD-10-CM

## 2019-05-09 DIAGNOSIS — Z98.890 OTHER SPECIFIED POSTPROCEDURAL STATES: Chronic | ICD-10-CM

## 2019-05-14 ENCOUNTER — OUTPATIENT (OUTPATIENT)
Dept: OUTPATIENT SERVICES | Facility: HOSPITAL | Age: 49
LOS: 1 days | Discharge: HOME | End: 2019-05-14

## 2019-05-14 ENCOUNTER — APPOINTMENT (OUTPATIENT)
Dept: INTERNAL MEDICINE | Facility: CLINIC | Age: 49
End: 2019-05-14

## 2019-05-14 VITALS
WEIGHT: 142 LBS | SYSTOLIC BLOOD PRESSURE: 130 MMHG | HEIGHT: 59 IN | BODY MASS INDEX: 28.63 KG/M2 | HEART RATE: 87 BPM | DIASTOLIC BLOOD PRESSURE: 80 MMHG

## 2019-05-14 DIAGNOSIS — Z98.890 OTHER SPECIFIED POSTPROCEDURAL STATES: Chronic | ICD-10-CM

## 2019-05-14 RX ORDER — RANITIDINE 300 MG/1
300 TABLET ORAL
Qty: 30 | Refills: 3 | Status: DISCONTINUED | COMMUNITY
Start: 2018-05-22 | End: 2019-05-14

## 2019-05-14 NOTE — PHYSICAL EXAM
[No Acute Distress] : no acute distress [Normal Outer Ear/Nose] : the outer ears and nose were normal in appearance [No Respiratory Distress] : no respiratory distress  [No JVD] : no jugular venous distention [Regular Rhythm] : with a regular rhythm [Normal Rate] : normal rate  [No Edema] : there was no peripheral edema [No Rash] : no rash [Coordination Grossly Intact] : coordination grossly intact [de-identified] : refused right lower extremity exam due to pain - refused to examine sensation even _ Right LE motor 5/5 and sensory intact

## 2019-05-14 NOTE — HISTORY OF PRESENT ILLNESS
[FreeTextEntry1] : follow up  [de-identified] : 49 year old female presenting for follow up \par she is still complaining of back pain and neck pain \par - she is stating that her pain is controlled with her recent pain management plan but the eventual plan is to decrease pain medications \par she is stating that she cannot reduce it because her pain medications \par she is stating that she has allergy itching nose and puffy eyes and face swelling- she states that this allergy is to dust and to smoking that is old but she wants to be referred to allergist immunologist for the symptoms

## 2019-05-14 NOTE — REVIEW OF SYSTEMS
[Back Pain] : back pain [Unsteady Walking] : ataxia [Depression] : depression [Negative] : Integumentary [Suicidal] : not suicidal [Insomnia] : no insomnia

## 2019-05-14 NOTE — PLAN
[FreeTextEntry1] : 49 year old lasy with chronic pain, GERD presenting for follow up examination\par \par #chronic back pain s/p lumbar fusion\par - pt follows with pain management\par - c/w pain regiment as directed by pain management\par - completed physical therapy\par - still on fentanyl patch, Percocet tabs not being able to wean \par - still complains of pain today but she didn't take her medications \par - since pain cannot be weaned off meds will follow up with neurosurgeon on 23rd May \par \par #? facial swelling \par referred to allergy immunology \par not severe - not admitted to ED \par resolved alone does not know trigger \par \par #depression -controlled \par -c/w escitalopram\par -follow up with psychiatry \par \par #GERD/Abdominal pain\par c/w omeprazole 40\par \par #HCM\par HPV neg 10/2017\par screening mammogram(12/2018): BIRADS 2\par RTC in 3 months. \par \par

## 2019-06-22 NOTE — CONSULT NOTE ADULT - CONSULT REASON
CC:  Lumbar degenerative disc disease s/p transforaminal lumbar interbody fusion (TLIF) at one level L5-S1 05/30/2018 by Dr. Strange
Benign thyroid cyst    CAD (coronary artery disease) with stent    Hearing decreased, bilateral    HLD (hyperlipidemia)    HTN (hypertension)    MGUS (monoclonal gammopathy of unknown significance)    Osteopenia    Osteoporosis    Torn meniscus  Both Knees

## 2019-08-02 ENCOUNTER — EMERGENCY (EMERGENCY)
Facility: HOSPITAL | Age: 49
LOS: 0 days | Discharge: HOME | End: 2019-08-02
Admitting: EMERGENCY MEDICINE
Payer: MEDICAID

## 2019-08-02 VITALS
OXYGEN SATURATION: 97 % | TEMPERATURE: 98 F | HEART RATE: 76 BPM | SYSTOLIC BLOOD PRESSURE: 128 MMHG | DIASTOLIC BLOOD PRESSURE: 66 MMHG | RESPIRATION RATE: 18 BRPM

## 2019-08-02 DIAGNOSIS — Z79.891 LONG TERM (CURRENT) USE OF OPIATE ANALGESIC: ICD-10-CM

## 2019-08-02 DIAGNOSIS — Z23 ENCOUNTER FOR IMMUNIZATION: ICD-10-CM
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
## 2019-08-02 DIAGNOSIS — T25.221A BURN OF SECOND DEGREE OF RIGHT FOOT, INITIAL ENCOUNTER: ICD-10-CM

## 2019-08-02 DIAGNOSIS — Z79.51 LONG TERM (CURRENT) USE OF INHALED STEROIDS: ICD-10-CM

## 2019-08-02 DIAGNOSIS — Z98.890 OTHER SPECIFIED POSTPROCEDURAL STATES: Chronic | ICD-10-CM
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
## 2019-08-02 DIAGNOSIS — Z79.899 OTHER LONG TERM (CURRENT) DRUG THERAPY: ICD-10-CM

## 2019-08-02 DIAGNOSIS — T30.0 BURN OF UNSPECIFIED BODY REGION, UNSPECIFIED DEGREE: ICD-10-CM
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
## 2019-08-02 PROCEDURE — 99283 EMERGENCY DEPT VISIT LOW MDM: CPT

## 2019-08-02 RX ORDER — TETANUS TOXOID, REDUCED DIPHTHERIA TOXOID AND ACELLULAR PERTUSSIS VACCINE, ADSORBED 5; 2.5; 8; 8; 2.5 [IU]/.5ML; [IU]/.5ML; UG/.5ML; UG/.5ML; UG/.5ML
0.5 SUSPENSION INTRAMUSCULAR ONCE
Refills: 0 | Status: COMPLETED | OUTPATIENT
Start: 2019-08-02 | End: 2019-08-02

## 2019-08-02 RX ADMIN — TETANUS TOXOID, REDUCED DIPHTHERIA TOXOID AND ACELLULAR PERTUSSIS VACCINE, ADSORBED 0.5 MILLILITER(S): 5; 2.5; 8; 8; 2.5 SUSPENSION INTRAMUSCULAR at 21:57

## 2019-08-02 NOTE — ED PROVIDER NOTE - NSFOLLOWUPCLINICS_GEN_ALL_ED_FT
[facility]  Burn  [address], [address]  [place] [address]  Phone: [phone]  Fax:   Follow Up Time:
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
## 2019-08-02 NOTE — ED PROVIDER NOTE - CLINICAL SUMMARY MEDICAL DECISION MAKING FREE TEXT BOX
Infected burn of right foot. Pt already up to date with tetanus. Given antibiotics and fu with burn clinic. I have discussed the discharge plan with the patient. The patient agrees with the plan, as discussed.  The patient understands Emergency Department diagnosis is a preliminary diagnosis often based on limited information and that the patient must adhere to the follow-up plan as discussed.  The patient understands that if the symptoms worsen or if prescribed medications do not have the desired/planned effect that the patient may return to the Emergency Department at any time for further evaluation and treatment.

## 2019-08-02 NOTE — ED PROVIDER NOTE - PHYSICAL EXAMINATION
VITAL SIGNS: I have reviewed nursing notes and confirm.  CONSTITUTIONAL: Well-developed; well-nourished; in no acute distress.  SKIN: +0.5cm unroofed blister to dorsum of right foot with surrounding erythema. Remainder of skin exam is warm and dry, no acute rash.  HEAD: Normocephalic; atraumatic.  EXT: Normal ROM. No edema.  LYMPH: No acute cervical adenopathy.  NEURO: Alert, oriented. Grossly unremarkable. No focal deficits.  PSYCH: Cooperative, appropriate.

## 2019-08-02 NOTE — ED PROVIDER NOTE - NS ED ROS FT
Review of Systems:  	•	CONSTITUTIONAL - no fever, no diaphoresis, no chills  	•	SKIN - +burn, +blister, no rash  	•	HEMATOLOGIC - no bleeding, no bruising  	•	EYES - no eye pain, no blurry vision  	•	ENT - no congestion  	•	RESPIRATORY - no shortness of breath, no cough  	•	CARDIAC - no chest pain, no palpitations  	•	GI - no abd pain, no nausea, no vomiting  	•	MUSCULOSKELETAL - no joint paint, no swelling, +redness  	•	NEUROLOGIC - no weakness, no headache, no paresthesias, no LOC  	All other ROS are negative except as documented in HPI.

## 2019-08-02 NOTE — ED PROVIDER NOTE - CARE PROVIDER_API CALL
Chuy Tafoya)  Plastic Surgery  [address] [place] [id number]  Phone: [phone]  Fax: [phone]  Follow Up Time: 1-3 Days

## 2019-08-02 NOTE — ED PROVIDER NOTE - NSFOLLOWUPINSTRUCTIONS_ED_ALL_ED_FT
Cellulitis    Cellulitis is a skin infection caused by bacteria. This condition occurs most often in the arms and lower legs but can occur anywhere over the body. Symptoms include redness, swelling, warm skin, tenderness, and chills/fever. If you were prescribed an antibiotic medicine, take it as told by your health care provider. Do not stop taking the antibiotic even if you start to feel better.    SEEK IMMEDIATE MEDICAL CARE IF YOU HAVE THE FOLLOWING SYMPTOMS: worsening fever, red streaks coming from affected area, vomiting or diarrhea, or dizziness/lightheadedness.          Second Degree Burn    WHAT YOU NEED TO KNOW:    What is a second degree burn? A second degree burn is also called a partial thickness burn. Your skin contains 3 layers. A second degree burn occurs when the first layer and some of the second layer are burned. This type of burn usually heals within 2 to 3 weeks with some scarring.    What are the types of a second degree burn?     A superficial second degree burn includes the first layer and some of the second layer. There is no damage in the deeper layers or in the sweat glands or oil glands.      A deep second degree burn includes damage in the middle layer, and in the sweat glands and oil glands.    What causes a second degree burn? Direct exposure to heat or flame is the most common cause of second degree burn. This includes contact with hot objects or flames such as an iron, a skillet, tar, cigarettes, or fireworks. The following may also cause a second degree burn:    Harsh chemicals, such as cleaning products, car battery acid, gasoline, or cement      Damaged electrical cords or electrical outlets      Hot water or steam      Exposure to harmful rays from the sun or from tanning beds    What are the signs and symptoms of a second degree burn?     Superficial second degree burn: The skin is red, moist, very painful to the touch, and has blisters. Areas of redness turn white when pressure is applied. The area returns to red quickly when the pressure is removed.      Deep second degree burn: The skin is mixed red or waxy white, wet or moist, and has no blisters. Some areas of redness may turn white when pressure is applied. The area may return to red slowly or not all when the pressure is removed.    How is a second degree burn diagnosed? Your healthcare provider will ask how you were burned. Tell him about your symptoms. He will examine your burn and determine how severe it is. Laser scanners may be used to check the blood flow in your skin.     How is a second degree burn treated?     Medicines may be used to decrease pain, prevent infection, or help your burn heal. They may be given as a pill or as an ointment applied to your skin.      Surgery may remove damaged tissue, replace or cover lost skin, or relieve pressure and improve blood flow. Surgery can help prevent infection, decrease inflammation, and improve healing. Surgery can also improve the appearance of your skin and reduce scarring.     How do I care for my second degree burn?     Wash your hands with soap and water and remove old bandages. You may need to soak the bandage in water before you remove it so it will not stick to your wound.      Gently clean the burned area daily with mild soap and water, and pat dry. Look for any swelling or redness around the burn. Do not break closed blisters, because this increases the risk for infection.      Apply cream or ointment to the burn with a cotton swab. Place a nonstick bandage over your burn.       Wrap a layer of gauze around the bandage to hold it in place. The wrap should be snug but not tight. It is too tight if you feel tingling or lose feeling in that area.      Apply gentle pressure for a few minutes if bleeding occurs.      Elevate your burned arm or leg above the level of your heart as often as you can. This will help decrease swelling and pain. Prop your burned arm or leg on pillows or blankets to keep it elevated comfortably.     Why may I need physical therapy? Your muscles and joints may not work well after a second degree burn. A physical therapist teaches you exercises to help improve movement and strength, and to decrease pain.    How can I prevent second degree burns?     Do not leave cups, mugs, or bowls containing hot liquids at the edge of a table. Keep pot handles turned away from the stove front.       Do not leave a lit cigarette. Discard it properly. Keep cigarette lighters and matches in a safe place where children cannot reach them.      Keep your water heater setting to low or medium.    When should I seek immediate care?     You have a fast heartbeat or breathing.      You are not urinating.    When should I contact my healthcare provider?     You have a fever.      You have increased redness, numbness, or swelling in the burn area.      Your wound or bandage is leaking pus and has a bad smell.      Your pain does not get better, or gets worse, even after you take pain medicine.      You have a dry mouth or eyes.      You are overly thirsty or tired.      You have dark yellow urine or urinate less than usual.      You have a headache or feel dizzy.      You have questions or concerns about your condition or care.    CARE AGREEMENT:    You have the right to help plan your care. Learn about your health condition and how it may be treated. Discuss treatment options with your healthcare providers to decide what care you want to receive. You always have the right to refuse treatment.

## 2019-08-02 NOTE — ED PROVIDER NOTE - OBJECTIVE STATEMENT
burn to right foot with hot oil two days ago infected unroofed blister 48 yo F with no significant pmhx presenting with burn to right foot with hot oil two days ago. States that she had blister which opened up and now has redness around area. Symptoms are moderate. No alleviating/aggravating factors. No cp, sob, fever, chills, abdominal pain, nausea, vomiting, diarrhea, back pain, urinary symptoms, headache, dizziness, paresthesias, or weakness.

## 2019-09-05 ENCOUNTER — APPOINTMENT (OUTPATIENT)
Dept: BURN CARE | Facility: CLINIC | Age: 49
End: 2019-09-05

## 2019-09-07 ENCOUNTER — OTHER (OUTPATIENT)
Age: 49
End: 2019-09-07

## 2019-09-17 ENCOUNTER — APPOINTMENT (OUTPATIENT)
Dept: INTERNAL MEDICINE | Facility: CLINIC | Age: 49
End: 2019-09-17

## 2019-09-17 ENCOUNTER — OUTPATIENT (OUTPATIENT)
Dept: OUTPATIENT SERVICES | Facility: HOSPITAL | Age: 49
LOS: 1 days | Discharge: HOME | End: 2019-09-17

## 2019-09-17 VITALS
BODY MASS INDEX: 29.43 KG/M2 | HEART RATE: 67 BPM | TEMPERATURE: 97.9 F | DIASTOLIC BLOOD PRESSURE: 80 MMHG | WEIGHT: 146 LBS | HEIGHT: 59 IN | SYSTOLIC BLOOD PRESSURE: 143 MMHG

## 2019-09-17 DIAGNOSIS — Z98.890 OTHER SPECIFIED POSTPROCEDURAL STATES: Chronic | ICD-10-CM

## 2019-09-17 DIAGNOSIS — Z63.4 DISAPPEARANCE AND DEATH OF FAMILY MEMBER: ICD-10-CM

## 2019-09-17 DIAGNOSIS — F33.2 MAJOR DEPRESSIVE DISORDER, RECURRENT SEVERE W/OUT PSYCHOTIC FEATURES: ICD-10-CM

## 2019-09-17 DIAGNOSIS — F41.9 ANXIETY DISORDER, UNSPECIFIED: ICD-10-CM

## 2019-09-17 DIAGNOSIS — F32.9 MAJOR DEPRESSIVE DISORDER, SINGLE EPISODE, UNSPECIFIED: ICD-10-CM

## 2019-09-17 RX ORDER — UBIDECARENONE/VIT E ACET 100MG-5
50 MCG CAPSULE ORAL
Qty: 30 | Refills: 5 | Status: DISCONTINUED | COMMUNITY
Start: 2018-04-06 | End: 2019-09-17

## 2019-09-17 SDOH — SOCIAL STABILITY - SOCIAL INSECURITY: DISSAPEARANCE AND DEATH OF FAMILY MEMBER: Z63.4

## 2019-09-17 NOTE — HISTORY OF PRESENT ILLNESS
[Formal Caregiver] : formal caregiver [FreeTextEntry1] : - ran out of medications\par - cough associated with phlegm \par - pain with urination along with nocturia [de-identified] : Pt is here because she ran out of her of all of her medications. She says she ran out of all of her medications a month ago and is not taking them. \par She also reports new onset cough since since the last 2 weeks that is associated with some white color phlegm.\par She also reports pain with urination along with frequency and nocturia

## 2019-09-17 NOTE — ASSESSMENT
[FreeTextEntry1] : 49 year old female with chronic pain, GERD presenting for follow up examination after she ran out of meds\par \par #chronic back pain s/p lumbar fusion\par - pt follows with pain management\par - c/w pain regiment as directed by pain management\par - completed physical therapy\par - patient advised to undergo surgery again by neurosurgery. She wants a second opinion as pt is concerned last surgery did not improve her symptoms\par - Will refer to [facility]\par \par # allergies\par - will refill all medication including diphenhydramine \par \par #depression -controlled \par -c/w escitalopram\par -follow up with psychiatry \par \par #GERD/Abdominal pain\par c/w omeprazole 40\par \par # asthma\par - cough since the last 2 weeks since she ran out of medications. having mild difficulty breathing right now. Resp rate normal no wheezing will just resume asthma Rx\par - will refill meds\par \par #HCM\par refused flu shot\par HPV neg 10/2017\par screening mammogram(12/2018): BIRADS 2\par \par

## 2019-09-17 NOTE — REVIEW OF SYSTEMS
[Shortness Of Breath] : shortness of breath [Cough] : cough [Dysuria] : dysuria [Frequency] : frequency [Negative] : Heme/Lymph [Fever] : no fever [Night Sweats] : no night sweats [Earache] : no earache [Hearing Loss] : no hearing loss [Nosebleed] : no nosebleeds [Nasal Discharge] : no nasal discharge [Sore Throat] : no sore throat [Postnasal Drip] : no postnasal drip [Wheezing] : no wheezing [Incontinence] : no incontinence [Hematuria] : no hematuria [de-identified] : reports new onset small red moles on skin [de-identified] : difficulty sleeping because of cough

## 2019-09-17 NOTE — PHYSICAL EXAM
[No Lymphadenopathy] : no lymphadenopathy [No JVD] : no jugular venous distention [No Accessory Muscle Use] : no accessory muscle use [Coordination Grossly Intact] : coordination grossly intact [Normal] : no posterior cervical lymphadenopathy and no anterior cervical lymphadenopathy [de-identified] : Difficulty breathing  [de-identified] : chronic pain in back [de-identified] : new onset skin lesions

## 2019-09-20 LAB
25(OH)D3 SERPL-MCNC: 23 NG/ML
ALBUMIN SERPL ELPH-MCNC: 4.5 G/DL
ALP BLD-CCNC: 124 U/L
ALT SERPL-CCNC: 31 U/L
ANION GAP SERPL CALC-SCNC: 14 MMOL/L
AST SERPL-CCNC: 16 U/L
BACTERIA UR CULT: NORMAL
BASOPHILS # BLD AUTO: 0.02 K/UL
BASOPHILS NFR BLD AUTO: 0.3 %
BILIRUB SERPL-MCNC: 0.3 MG/DL
BUN SERPL-MCNC: 15 MG/DL
CALCIUM SERPL-MCNC: 9.1 MG/DL
CHLORIDE SERPL-SCNC: 106 MMOL/L
CHOLEST SERPL-MCNC: 168 MG/DL
CHOLEST/HDLC SERPL: 3.1 RATIO
CO2 SERPL-SCNC: 25 MMOL/L
CREAT SERPL-MCNC: 0.6 MG/DL
EOSINOPHIL # BLD AUTO: 0.12 K/UL
EOSINOPHIL NFR BLD AUTO: 2 %
ESTIMATED AVERAGE GLUCOSE: 120 MG/DL
GLUCOSE SERPL-MCNC: 97 MG/DL
HBA1C MFR BLD HPLC: 5.8 %
HCT VFR BLD CALC: 39 %
HDLC SERPL-MCNC: 55 MG/DL
HGB BLD-MCNC: 12.8 G/DL
IMM GRANULOCYTES NFR BLD AUTO: 0.2 %
LDLC SERPL CALC-MCNC: 103 MG/DL
LYMPHOCYTES # BLD AUTO: 2.05 K/UL
LYMPHOCYTES NFR BLD AUTO: 33.6 %
MAN DIFF?: NORMAL
MCHC RBC-ENTMCNC: 29.3 PG
MCHC RBC-ENTMCNC: 32.8 G/DL
MCV RBC AUTO: 89.2 FL
MONOCYTES # BLD AUTO: 0.31 K/UL
MONOCYTES NFR BLD AUTO: 5.1 %
NEUTROPHILS # BLD AUTO: 3.6 K/UL
NEUTROPHILS NFR BLD AUTO: 58.8 %
PLATELET # BLD AUTO: 195 K/UL
POTASSIUM SERPL-SCNC: 4.6 MMOL/L
PROT SERPL-MCNC: 7.5 G/DL
RBC # BLD: 4.37 M/UL
RBC # FLD: 12.6 %
SODIUM SERPL-SCNC: 145 MMOL/L
TRIGL SERPL-MCNC: 80 MG/DL
TSH SERPL-ACNC: 0.71 UIU/ML
WBC # FLD AUTO: 6.11 K/UL

## 2019-09-25 DIAGNOSIS — M47.9 SPONDYLOSIS, UNSPECIFIED: ICD-10-CM

## 2019-09-25 DIAGNOSIS — R39.9 UNSPECIFIED SYMPTOMS AND SIGNS INVOLVING THE GENITOURINARY SYSTEM: ICD-10-CM

## 2019-10-01 NOTE — ED PROVIDER NOTE - NS ED MD DISPO DISCHARGE CCDA
Hgb today is 10.3.  No procrit today per guidelines.   Patient without questions or concerns at this time.   Given copy of labs.    Patient/Caregiver provided printed discharge information.

## 2019-10-02 ENCOUNTER — APPOINTMENT (OUTPATIENT)
Dept: NEUROSURGERY | Facility: CLINIC | Age: 49
End: 2019-10-02
Payer: MEDICAID

## 2019-10-02 VITALS — BODY MASS INDEX: 30.24 KG/M2 | WEIGHT: 150 LBS | HEIGHT: 59 IN

## 2019-10-02 PROCEDURE — 99203 OFFICE O/P NEW LOW 30 MIN: CPT

## 2019-10-03 NOTE — HISTORY OF PRESENT ILLNESS
[de-identified] : This is a 49 yrs old female with a history of cervical and lumbar surgeries who presents for a second opinion reporting of neck and back pain since 2010. Patient reports of neck pain radiating to both shoulder and arms, right more than left. It is associated with numbness on the first three digits of her left hand, pins & needles, burning sensation on her shoulders, and dropping items. She also report of low back pain radiating to both legs posteriorly, right more than left. It is associated with numbness and pins and needles in the plantar of both feet. She has done physical therapy for both neck and back and feels her symptoms were not alleviated. She has received injection in her neck with no relief, but not her back since pain management feels it is too soon from her back surgery. Denies bowel and/or urinary incontinence. Pain is worse when sitting and standing, and pain subsides when she takes pain medication. \par \par CT of the lumbar spine without contrast done on 2/22/19 status post bilateral laminectomy defect at L5-S1 with fusion with bilateral transpedicular screws at L5 and S1 with intervertebral disc cage device which appear to be in satisfactory alignment with mild anterior cortical breakthrough of the left L5 transpedicular screw as described above. \par Multilevel mild diffuse disc bulge at L3-4 and L4-5 resulting in flattening of ventral thecal sac with mild spinal canal stenosis at L4-5 and mild to moderate bilateral neuroforaminal narrowing at L4-5\par \par MRI of the cervical spine without contrast done on 1/17/19 showed no significant changes from the prior exam. Status post prior ACDF C3-C4 and \par C4-C5. Right posterior spurring at C3-C4 with mild to moderate spinal canal stenosis

## 2019-10-03 NOTE — PLAN
[FreeTextEntry1] : Discuss MRI and CT results with patient. Recommend spinal cord stimulator consultation and evaluation.

## 2019-10-22 ENCOUNTER — OUTPATIENT (OUTPATIENT)
Dept: OUTPATIENT SERVICES | Facility: HOSPITAL | Age: 49
LOS: 1 days | Discharge: HOME | End: 2019-10-22

## 2019-10-22 ENCOUNTER — APPOINTMENT (OUTPATIENT)
Dept: NEUROLOGY | Facility: CLINIC | Age: 49
End: 2019-10-22

## 2019-10-22 DIAGNOSIS — Z98.890 OTHER SPECIFIED POSTPROCEDURAL STATES: Chronic | ICD-10-CM

## 2019-11-05 ENCOUNTER — OUTPATIENT (OUTPATIENT)
Dept: OUTPATIENT SERVICES | Facility: HOSPITAL | Age: 49
LOS: 1 days | Discharge: HOME | End: 2019-11-05

## 2019-11-05 ENCOUNTER — APPOINTMENT (OUTPATIENT)
Dept: INTERNAL MEDICINE | Facility: CLINIC | Age: 49
End: 2019-11-05
Payer: MEDICAID

## 2019-11-05 VITALS
WEIGHT: 150 LBS | SYSTOLIC BLOOD PRESSURE: 129 MMHG | DIASTOLIC BLOOD PRESSURE: 75 MMHG | BODY MASS INDEX: 30.24 KG/M2 | HEIGHT: 59 IN | HEART RATE: 73 BPM

## 2019-11-05 DIAGNOSIS — R74.0 NONSPECIFIC ELEVATION OF LEVELS OF TRANSAMINASE AND LACTIC ACID DEHYDROGENASE [LDH]: ICD-10-CM

## 2019-11-05 DIAGNOSIS — Z88.9 ALLERGY STATUS TO UNSPECIFIED DRUGS, MEDICAMENTS AND BIOLOGICAL SUBSTANCES: ICD-10-CM

## 2019-11-05 DIAGNOSIS — I73.9 PERIPHERAL VASCULAR DISEASE, UNSPECIFIED: ICD-10-CM

## 2019-11-05 DIAGNOSIS — Z98.890 OTHER SPECIFIED POSTPROCEDURAL STATES: Chronic | ICD-10-CM

## 2019-11-05 PROCEDURE — 99213 OFFICE O/P EST LOW 20 MIN: CPT | Mod: GC

## 2019-11-05 RX ORDER — OMEPRAZOLE 20 MG/1
20 CAPSULE, DELAYED RELEASE ORAL DAILY
Qty: 30 | Refills: 4 | Status: DISCONTINUED | COMMUNITY
Start: 2018-10-05 | End: 2019-11-05

## 2019-11-05 RX ORDER — NITROFURANTOIN MACROCRYSTALS 100 MG/1
100 CAPSULE ORAL
Qty: 10 | Refills: 0 | Status: DISCONTINUED | COMMUNITY
Start: 2019-09-17 | End: 2019-11-05

## 2019-11-05 NOTE — PHYSICAL EXAM
[No Acute Distress] : no acute distress [Well Nourished] : well nourished [Well Developed] : well developed [Well-Appearing] : well-appearing [Normal Sclera/Conjunctiva] : normal sclera/conjunctiva [PERRL] : pupils equal round and reactive to light [EOMI] : extraocular movements intact [Normal Outer Ear/Nose] : the outer ears and nose were normal in appearance [Normal Oropharynx] : the oropharynx was normal [No JVD] : no jugular venous distention [No Lymphadenopathy] : no lymphadenopathy [Supple] : supple [Thyroid Normal, No Nodules] : the thyroid was normal and there were no nodules present [No Respiratory Distress] : no respiratory distress  [No Accessory Muscle Use] : no accessory muscle use [Clear to Auscultation] : lungs were clear to auscultation bilaterally [Normal Rate] : normal rate  [Regular Rhythm] : with a regular rhythm [Normal S1, S2] : normal S1 and S2 [No Murmur] : no murmur heard [Soft] : abdomen soft [Non Tender] : non-tender [Non-distended] : non-distended [No Masses] : no abdominal mass palpated [No HSM] : no HSM [Normal Bowel Sounds] : normal bowel sounds [Normal Posterior Cervical Nodes] : no posterior cervical lymphadenopathy [Normal Anterior Cervical Nodes] : no anterior cervical lymphadenopathy [No CVA Tenderness] : no CVA  tenderness [No Spinal Tenderness] : no spinal tenderness [No Joint Swelling] : no joint swelling [Grossly Normal Strength/Tone] : grossly normal strength/tone [No Rash] : no rash [Normal Gait] : normal gait [Deep Tendon Reflexes (DTR)] : deep tendon reflexes were 2+ and symmetric [Normal Affect] : the affect was normal [Normal Insight/Judgement] : insight and judgment were intact [No Edema] : there was no peripheral edema

## 2019-11-05 NOTE — HISTORY OF PRESENT ILLNESS
[Family Member] : family member [FreeTextEntry1] : follow-up of results and Neurosx consultation [de-identified] : 50y/o F with PMH asthma, allergic rhinitis, depression/anxiety, vit D def, chronic back/neck pain presenting for follow-up.\par Last seen 9/17/19 for med refill. Has been seen by Dr. Ricketts for back pain findings, suggested possible spinal cord stimulator. \par Pt is going to try for the spinal cord stimulator. \par Urinary symptoms have resolved. \par Still having significant heartburn despite medication adjustment. \par Labs reviewed.

## 2019-11-05 NOTE — ASSESSMENT
[FreeTextEntry1] : 49 year old female with chronic pain, GERD presenting for follow up examination after she ran out of meds\par \par #chronic back pain s/p lumbar fusion\par - c/w pain regimen as per pain management\par - completed physical therapy\par - was seen by Dr. Ricketts who recommended possible spinal stimulator \par \par # allergies\par - renew benadryl, fluticasone nasal spray\par \par #depression -controlled \par -c/w escitalopram\par -follow up with psychiatry \par \par #GERD/Abdominal pain\par - was decreased to omeprazole 20 but symptoms now worsening\par - will return to omeprazole 40\par - will refer to GI for colo and possible EGD\par \par # asthma\par - c/w proair, montelukast \par \par #HCM\par - refused flu shot again. will defer. \par - HPV neg 10/2017; due for Pap\par - screening mammogram(12/2018): BIRADS 2; due for another this year\par - labs reviewed with pt/family, no major concerns\par

## 2019-11-24 ENCOUNTER — EMERGENCY (EMERGENCY)
Facility: HOSPITAL | Age: 49
LOS: 0 days | Discharge: HOME | End: 2019-11-24
Admitting: EMERGENCY MEDICINE
Payer: MEDICAID

## 2019-11-24 VITALS
DIASTOLIC BLOOD PRESSURE: 82 MMHG | OXYGEN SATURATION: 95 % | TEMPERATURE: 98 F | HEART RATE: 76 BPM | SYSTOLIC BLOOD PRESSURE: 145 MMHG

## 2019-11-24 DIAGNOSIS — Z98.890 OTHER SPECIFIED POSTPROCEDURAL STATES: Chronic | ICD-10-CM

## 2019-11-24 DIAGNOSIS — Z79.2 LONG TERM (CURRENT) USE OF ANTIBIOTICS: ICD-10-CM
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
## 2019-11-24 DIAGNOSIS — Z79.899 OTHER LONG TERM (CURRENT) DRUG THERAPY: ICD-10-CM

## 2019-11-24 DIAGNOSIS — S70.12XA CONTUSION OF LEFT THIGH, INITIAL ENCOUNTER: ICD-10-CM

## 2019-11-24 DIAGNOSIS — M25.512 PAIN IN LEFT SHOULDER: ICD-10-CM

## 2019-11-24 DIAGNOSIS — S80.11XA CONTUSION OF RIGHT LOWER LEG, INITIAL ENCOUNTER: ICD-10-CM

## 2019-11-24 DIAGNOSIS — S40.012A CONTUSION OF LEFT SHOULDER, INITIAL ENCOUNTER: ICD-10-CM
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
## 2019-11-24 DIAGNOSIS — Z90.49 ACQUIRED ABSENCE OF OTHER SPECIFIED PARTS OF DIGESTIVE TRACT: ICD-10-CM

## 2019-11-24 PROCEDURE — 73590 X-RAY EXAM OF LOWER LEG: CPT | Mod: 26,RT

## 2019-11-24 PROCEDURE — 93970 EXTREMITY STUDY: CPT | Mod: 26

## 2019-11-24 PROCEDURE — 73502 X-RAY EXAM HIP UNI 2-3 VIEWS: CPT | Mod: 26,LT

## 2019-11-24 PROCEDURE — 73030 X-RAY EXAM OF SHOULDER: CPT | Mod: 26,LT

## 2019-11-24 PROCEDURE — 99284 EMERGENCY DEPT VISIT MOD MDM: CPT

## 2019-11-24 PROCEDURE — 73060 X-RAY EXAM OF HUMERUS: CPT | Mod: 26,LT

## 2019-11-24 PROCEDURE — 73552 X-RAY EXAM OF FEMUR 2/>: CPT | Mod: 26,LT

## 2019-11-24 RX ORDER — METHOCARBAMOL 500 MG/1
2 TABLET, FILM COATED ORAL
Qty: 18 | Refills: 0
Start: 2019-11-24 | End: 2019-11-26

## 2019-11-24 RX ORDER — KETOROLAC TROMETHAMINE 30 MG/ML
30 SYRINGE (ML) INJECTION ONCE
Refills: 0 | Status: DISCONTINUED | OUTPATIENT
Start: 2019-11-24 | End: 2019-11-24

## 2019-11-24 RX ADMIN — Medication 30 MILLIGRAM(S): at 12:40

## 2019-11-24 NOTE — ED PROVIDER NOTE - CLINICAL SUMMARY MEDICAL DECISION MAKING FREE TEXT BOX
Patient presenting for evaluation after mechanical fall 1 week ago with left UE pain and bilateral LE pain. No head injury or LOC. Neurovascularly intact. Venous duplex neg. Xrays neg. Advised pmd, ortho, and rehab fu. Toradol given for pain. Robaxin prescribed. I have discussed the discharge plan with the patient. The patient agrees with the plan, as discussed.  The patient understands Emergency Department diagnosis is a preliminary diagnosis often based on limited information and that the patient must adhere to the follow-up plan as discussed.  The patient understands that if the symptoms worsen or if prescribed medications do not have the desired/planned effect that the patient may return to the Emergency Department at any time for further evaluation and treatment.

## 2019-11-24 NOTE — ED PROVIDER NOTE - OBJECTIVE STATEMENT
48 yo F with pmhx of asthma, back/neck surgeries presenting with left shoulder pain, left hip pain, left leg pain, right calf pain after mechanical fall Sunday morning. Symptoms are moderate. Pain is worse with movement and palpation. States she fell down the stairs on her buttocks. No head injury or LOC. No anticoagulation. States she has chronic neck and back pain no new pain since fall. Normally ambulates with cane. No cp, sob, fever, chills, abdominal pain, nausea, vomiting, diarrhea, urinary symptoms, headache, dizziness, paresthesias, or weakness.

## 2019-11-24 NOTE — ED PROVIDER NOTE - CARE PROVIDER_API CALL
Your primary care provider,   Phone: (   )    -  Fax: (   )    -  Follow Up Time: 1-3 Days    Wade Peacock)  Orthopaedic Surgery  [address] [place] [id number]  Phone: [phone]  Fax: [phone]  Follow Up Time: 1-3 Days

## 2019-11-24 NOTE — ED PROVIDER NOTE - NS ED ROS FT
Review of Systems:  	•	CONSTITUTIONAL - no fever, no diaphoresis, no chills  	•	SKIN - no rash  	•	HEMATOLOGIC - no bleeding, + bruising  	•	EYES - no eye pain, no blurry vision  	•	ENT - no congestion  	•	RESPIRATORY - no shortness of breath, no cough  	•	CARDIAC - no chest pain, no palpitations  	•	GI - no abd pain, no nausea, no vomiting, no diarrhea, no constipation  	•	GENITO-URINARY - no dysuria; no hematuria, no increased urinary frequency  	•	MUSCULOSKELETAL - +shoulder pain, +bilateral lower extremity pain, no swelling, no redness  	•	NEUROLOGIC - no weakness, no headache, no paresthesias, no LOC  	All other ROS are negative except as documented in HPI.

## 2019-11-24 NOTE — ED PROVIDER NOTE - NSFOLLOWUPINSTRUCTIONS_ED_ALL_ED_FT
Contusion    A contusion is a deep bruise. Contusions are the result of a blunt injury to tissues and muscle fibers under the skin. The injury causes bleeding under the skin. The skin overlying the contusion may turn blue, purple, or yellow. Minor injuries will give you a painless contusion, but more severe injuries cause contusions that may stay painful and swollen for a few weeks.  Follow these instructions at home:  Pay attention to any changes in your symptoms. Let your health care provider know about them. Take these actions to relieve your pain.  Managing pain, stiffness, and swelling        Use resting, icing, applying pressure (compression), and raising (elevating) the injured area. This is often called the RICE strategy.  Rest the injured area. Return to your normal activities as told by your health care provider. Ask your health care provider what activities are safe for you.If directed, put ice on the injured area:  Put ice in a plastic bag.Place a towel between your skin and the bag.Leave the ice on for 20 minutes, 2–3 times per day.If directed, apply light compression to the injured area using an elastic bandage. Make sure the bandage is not wrapped too tightly. Remove and reapply the bandage as directed by your health care provider.If possible, raise (elevate) the injured area above the level of your heart while you are sitting or lying down.General instructions     Take over-the-counter and prescription medicines only as told by your health care provider.Keep all follow-up visits as told by your health care provider. This is important.Contact a health care provider if:  Your symptoms do not improve after several days of treatment.Your symptoms get worse.You have difficulty moving the injured area.Get help right away if:  You have severe pain.You have numbness in a hand or foot.Your hand or foot turns pale or cold.Summary  A contusion is a deep bruise.Contusions are the result of a blunt injury to tissues and muscle fibers under the skin.It is treated with rest, ice, compression, and elevation. You may be given over-the-counter medicines for pain.Contact a health care provider if your symptoms do not improve, or get worse. Get help right away if you have severe pain, have numbness, or the area turns pale or cold.This information is not intended to replace advice given to you by your health care provider. Make sure you discuss any questions you have with your health care provider.

## 2019-11-24 NOTE — ED PROVIDER NOTE - NSFOLLOWUPCLINICS_GEN_ALL_ED_FT
[facility] ([facility])  Rehabilitation  [facility] [address] [place] [address]  Phone: [phone]  Fax:   Follow Up Time: 1-3 Days

## 2019-11-24 NOTE — ED PROVIDER NOTE - PHYSICAL EXAMINATION
VITAL SIGNS: I have reviewed nursing notes and confirm.  CONSTITUTIONAL: Well-developed; well-nourished; in no acute distress.  SKIN: +Ecchymosis noted to left hip, left thigh. Remainder of skin exam is warm and dry, no acute rash.  HEAD: Normocephalic; atraumatic.  EYES: PERRL, EOM intact; conjunctiva and sclera clear.  ENT: No nasal discharge; airway clear.   NECK: Supple; non tender. No C-spine tenderness.   CARD: S1, S2 normal; no murmurs, gallops, or rubs. Regular rate and rhythm.  RESP: Clear to auscultation bilaterally. No wheezes, rales or rhonchi.  ABD: Normal bowel sounds; soft; non-distended; non-tender.   EXT: Normal ROM. No edema. +Tenderness to left shoulder, right calf, left hip/left thigh. No midline tenderness.   LYMPH: No acute cervical adenopathy.  NEURO: Alert, oriented. Grossly unremarkable. No focal deficits.  PSYCH: Cooperative, appropriate.

## 2019-11-24 NOTE — ED PROVIDER NOTE - PATIENT PORTAL LINK FT
You can access the FollowMyHealth Patient Portal offered by [facility] by registering at the following website: [email or web address]. By joining [employer]’s FollowMyHealth portal, you will also be able to view your health information using other applications (apps) compatible with our system.

## 2019-11-24 NOTE — ED PROVIDER NOTE - PROVIDER TOKENS
FREE:[LAST:[Your primary care provider],PHONE:[(   )    -],FAX:[(   )    -],FOLLOWUP:[1-3 Days]],PROVIDER:[TOKEN:[[id number]],FOLLOWUP:[1-3 Days]]

## 2019-11-24 NOTE — ED ADULT TRIAGE NOTE - CHIEF COMPLAINT QUOTE
fell down stairs one week ago pain in left arm and b/l le. denies head trauma denies blood thinners denies loc at time of fall

## 2019-11-25 ENCOUNTER — INBOUND DOCUMENT (OUTPATIENT)
Age: 49
End: 2019-11-25

## 2019-12-10 ENCOUNTER — FORM ENCOUNTER (OUTPATIENT)
Age: 49
End: 2019-12-10

## 2019-12-11 ENCOUNTER — OUTPATIENT (OUTPATIENT)
Dept: OUTPATIENT SERVICES | Facility: HOSPITAL | Age: 49
LOS: 1 days | Discharge: HOME | End: 2019-12-11
Payer: MEDICAID

## 2019-12-11 DIAGNOSIS — Z98.890 OTHER SPECIFIED POSTPROCEDURAL STATES: Chronic | ICD-10-CM

## 2019-12-11 DIAGNOSIS — Z12.31 ENCOUNTER FOR SCREENING MAMMOGRAM FOR MALIGNANT NEOPLASM OF BREAST: ICD-10-CM

## 2019-12-11 PROCEDURE — 77063 BREAST TOMOSYNTHESIS BI: CPT | Mod: 26

## 2019-12-11 PROCEDURE — 77067 SCR MAMMO BI INCL CAD: CPT | Mod: 26

## 2019-12-16 NOTE — ED PROVIDER NOTE - NS HIV RISK FACTOR YES
Needs updated orders in Epic- specimens drawn  Waiting for orders   
Updated current standing lab orders.  
Declined

## 2019-12-27 ENCOUNTER — OUTPATIENT (OUTPATIENT)
Dept: OUTPATIENT SERVICES | Facility: HOSPITAL | Age: 49
LOS: 1 days | Discharge: HOME | End: 2019-12-27

## 2019-12-27 ENCOUNTER — APPOINTMENT (OUTPATIENT)
Dept: GASTROENTEROLOGY | Facility: CLINIC | Age: 49
End: 2019-12-27
Payer: MEDICAID

## 2019-12-27 DIAGNOSIS — Z98.890 OTHER SPECIFIED POSTPROCEDURAL STATES: Chronic | ICD-10-CM

## 2019-12-27 DIAGNOSIS — R10.13 EPIGASTRIC PAIN: ICD-10-CM

## 2019-12-27 PROCEDURE — 99214 OFFICE O/P EST MOD 30 MIN: CPT

## 2019-12-27 NOTE — ASSESSMENT
[FreeTextEntry1] : A 49 y old female pt with hx of chronic back pain S/P laminectomy, GERD on omeprazole 20 mg daily,asthma,  HTN, anxiety, presented for follow up for epigastric pain and for screening colonoscopy\par \par # Chronic intermittent  Epigastric pain; EGD 11/2018 showed gastritis, negative for HP; may be 2/2 intestinal spasm, not related to food\par Will do CT abdomen with contrast \par Dicyclomine 10 mg PRN \par Continue omeprazole for GERD \par \par # Constipation \par Miralax once daily as needed \par Will schedule for colonoscopy \par \par # Average risk for colon cancer \par No AC \par ASA 3 \par Will schedule for colonoscopy \par \par

## 2019-12-27 NOTE — END OF VISIT
[] : Fellow [FreeTextEntry3] : 50 y/o F with intermittent abd pain, not related to food. Potentially 2/2 intestinal spasm. Will prescribe dicyclomine, CT to r/o other intra-abdominal causes. Schedule for screening colonoscopy.

## 2019-12-27 NOTE — REVIEW OF SYSTEMS
[Recent Weight Gain (___ Lbs)] : recent [unfilled] ~Ulb weight gain [As Noted in HPI] : as noted in HPI [Joint Pain] : joint pain [Arthralgias] : arthralgias [Negative] : Genitourinary

## 2019-12-27 NOTE — HISTORY OF PRESENT ILLNESS
[de-identified] : A 49 y old female pt with hx of chronic back pain S/P laminectomy, GERD on omeprazole 20 mg daily,asthma,  HTN, anxiety, presented for follow up for epigastric pain and for screening colonoscopy. Pt reports chronic epigastric pain, intermittent, sharp,  radiating to the chest sometimes , severe, not related to food, last for few min. She also reports constipation. She denies any vomiting, dysphagia, odynophagia, hematochezia or melena, or weight loss. Had EGD 11/2018 which showed gastritis, negative for HP. \par

## 2019-12-27 NOTE — PHYSICAL EXAM
[General Appearance - In No Acute Distress] : in no acute distress [General Appearance - Alert] : alert [General Appearance - Well Nourished] : well nourished [Extraocular Movements] : extraocular movements were intact [Outer Ear] : the ears and nose were normal in appearance [Sclera] : the sclera and conjunctiva were normal [Hearing Threshold Finger Rub Not Banner] : hearing was normal [Neck Cervical Mass (___cm)] : no neck mass was observed [Neck Appearance] : the appearance of the neck was normal [Exaggerated Use Of Accessory Muscles For Inspiration] : no accessory muscle use [] : no respiratory distress [Bowel Sounds] : normal bowel sounds [Abdomen Soft] : soft [Abdomen Tenderness] : non-tender [Skin Color & Pigmentation] : normal skin color and pigmentation [Abnormal Walk] : normal gait

## 2019-12-30 DIAGNOSIS — K59.00 CONSTIPATION, UNSPECIFIED: ICD-10-CM

## 2020-01-02 ENCOUNTER — APPOINTMENT (OUTPATIENT)
Dept: NEUROSURGERY | Facility: CLINIC | Age: 50
End: 2020-01-02
Payer: MEDICAID

## 2020-01-02 VITALS — WEIGHT: 145 LBS | HEIGHT: 59 IN | BODY MASS INDEX: 29.23 KG/M2

## 2020-01-02 DIAGNOSIS — Z78.9 OTHER SPECIFIED HEALTH STATUS: ICD-10-CM

## 2020-01-02 PROCEDURE — 99214 OFFICE O/P EST MOD 30 MIN: CPT

## 2020-01-02 NOTE — HISTORY OF PRESENT ILLNESS
[FreeTextEntry1] : Ms. Guevara is s/p C3/4 C4/5 ACDF and an L5/S1 TLIFwhich were both performed by an outside specialist. Her symptoms started 10 years ago. She continues to experience chronic neck and lower back pain with associated radiculopathies. She has trialed various conservative treatments and is currently medically managed by pain management. \par \par Last visit a CT of the lumbar spine was reviewed from 2/2019 which showed postop changes at L5/S1 with  stable hardware placement. She was found to have adjacent segment spondylosis at L3/4 and L4/5 with foraminal narrowing. She also had an MRI of the cervical spine in 1/2019 which showed postop changes at C3/4 and C4/5 with some spurring on the right at C3/4  and mild-moderate canal stenosis. \par \par Upon her last visit, SCS stimulator trials were recommended for her chronic pain. Unfortunately, her current pain management specialist, Dr. Reeves, no longer accepts her insurance. She has an appointment to see another pain specialist in the upcoming week. She did undergo neuropsychology testing for the SCS trial and the results are still pending.

## 2020-01-02 NOTE — ASSESSMENT
[FreeTextEntry1] : I have recommended she follow up with pain management for SCS trials. This will be for treatment of chronic neck and lower back pain. Since she is not sure which pain specialist she is scheduled to see she will contact me later today to let me know. I would like to be sure that that specific pain specialist will do SCS trials.

## 2020-01-02 NOTE — PHYSICAL EXAM
[FreeTextEntry1] : \par General - well appearing in moderate acute distress. Ambulates with a cane. \par \par HEENT-normocephalic atraumatic\par \par Skin-intact\par \par CV-no edema, good distal pulses, radial and DP\par \par Lungs-no wheezing or cyanosis\par \par Musculoskeletal- significant decreased ROM; no tenderness to palpation; negative Spurling’s; negative Lhermittes; negative straight leg raise; negative FABERs; negative Tinels\par \par Neuro- awake, alert, and oriented; CN II-XII intact; 5/5 strength; sensation intact; reflexes normal; no Hoffmans or clonus, gait antalgic \par

## 2020-01-07 ENCOUNTER — OUTPATIENT (OUTPATIENT)
Dept: OUTPATIENT SERVICES | Facility: HOSPITAL | Age: 50
LOS: 1 days | Discharge: HOME | End: 2020-01-07

## 2020-01-07 DIAGNOSIS — Z98.890 OTHER SPECIFIED POSTPROCEDURAL STATES: Chronic | ICD-10-CM

## 2020-01-08 LAB
ALBUMIN SERPL ELPH-MCNC: 4.7 G/DL
ALP BLD-CCNC: 120 U/L
ALT SERPL-CCNC: 24 U/L
ANION GAP SERPL CALC-SCNC: 14 MMOL/L
AST SERPL-CCNC: 12 U/L
BASOPHILS # BLD AUTO: 0.02 K/UL
BASOPHILS NFR BLD AUTO: 0.3 %
BILIRUB SERPL-MCNC: 0.4 MG/DL
BUN SERPL-MCNC: 11 MG/DL
CALCIUM SERPL-MCNC: 9.6 MG/DL
CHLORIDE SERPL-SCNC: 102 MMOL/L
CO2 SERPL-SCNC: 25 MMOL/L
CREAT SERPL-MCNC: 0.6 MG/DL
EOSINOPHIL # BLD AUTO: 0.08 K/UL
EOSINOPHIL NFR BLD AUTO: 1.3 %
GLUCOSE SERPL-MCNC: 88 MG/DL
HCT VFR BLD CALC: 40.2 %
HGB BLD-MCNC: 12.8 G/DL
IMM GRANULOCYTES NFR BLD AUTO: 0.2 %
INR PPP: 1.04 RATIO
LYMPHOCYTES # BLD AUTO: 2.67 K/UL
LYMPHOCYTES NFR BLD AUTO: 44.1 %
MAN DIFF?: NORMAL
MCHC RBC-ENTMCNC: 28.8 PG
MCHC RBC-ENTMCNC: 31.8 G/DL
MCV RBC AUTO: 90.3 FL
MONOCYTES # BLD AUTO: 0.21 K/UL
MONOCYTES NFR BLD AUTO: 3.5 %
NEUTROPHILS # BLD AUTO: 3.06 K/UL
NEUTROPHILS NFR BLD AUTO: 50.6 %
PLATELET # BLD AUTO: 202 K/UL
POTASSIUM SERPL-SCNC: 4.4 MMOL/L
PROT SERPL-MCNC: 8 G/DL
PT BLD: 11.9 SEC
RBC # BLD: 4.45 M/UL
RBC # FLD: 12.6 %
SODIUM SERPL-SCNC: 141 MMOL/L
WBC # FLD AUTO: 6.05 K/UL

## 2020-01-20 NOTE — ED ADULT TRIAGE NOTE - AS TEMP SITE
----- Message from Joseph Kaye sent at 1/20/2020  1:16 PM CST -----  Contact: pt @ portal  RE: portal left by Susan on 01/20/20    Pt states she cannot afford co-pay and will not be able to take the medication  
oral

## 2020-02-11 ENCOUNTER — APPOINTMENT (OUTPATIENT)
Dept: INTERNAL MEDICINE | Facility: CLINIC | Age: 50
End: 2020-02-11
Payer: MEDICAID

## 2020-02-11 ENCOUNTER — OUTPATIENT (OUTPATIENT)
Dept: OUTPATIENT SERVICES | Facility: HOSPITAL | Age: 50
LOS: 1 days | Discharge: HOME | End: 2020-02-11

## 2020-02-11 VITALS
HEART RATE: 77 BPM | SYSTOLIC BLOOD PRESSURE: 132 MMHG | WEIGHT: 150 LBS | BODY MASS INDEX: 30.24 KG/M2 | DIASTOLIC BLOOD PRESSURE: 85 MMHG | HEIGHT: 59 IN

## 2020-02-11 DIAGNOSIS — Z98.890 OTHER SPECIFIED POSTPROCEDURAL STATES: Chronic | ICD-10-CM

## 2020-02-11 DIAGNOSIS — J30.9 ALLERGIC RHINITIS, UNSPECIFIED: ICD-10-CM

## 2020-02-11 PROCEDURE — 99213 OFFICE O/P EST LOW 20 MIN: CPT | Mod: GC

## 2020-02-11 NOTE — PHYSICAL EXAM
[No Acute Distress] : no acute distress [Well Developed] : well developed [Well Nourished] : well nourished [Normal Voice/Communication] : normal voice/communication [Well-Appearing] : well-appearing [No Respiratory Distress] : no respiratory distress  [Clear to Auscultation] : lungs were clear to auscultation bilaterally [No Accessory Muscle Use] : no accessory muscle use [Normal Rate] : normal rate  [Regular Rhythm] : with a regular rhythm [Normal S1, S2] : normal S1 and S2 [No Murmur] : no murmur heard [Soft] : abdomen soft [Non Tender] : non-tender [Normal Bowel Sounds] : normal bowel sounds [No HSM] : no HSM [Non-distended] : non-distended [No Joint Swelling] : no joint swelling [Grossly Normal Strength/Tone] : grossly normal strength/tone

## 2020-02-19 DIAGNOSIS — J45.909 UNSPECIFIED ASTHMA, UNCOMPLICATED: ICD-10-CM

## 2020-02-19 DIAGNOSIS — M54.9 DORSALGIA, UNSPECIFIED: ICD-10-CM

## 2020-02-19 DIAGNOSIS — J30.9 ALLERGIC RHINITIS, UNSPECIFIED: ICD-10-CM

## 2020-02-19 DIAGNOSIS — K21.9 GASTRO-ESOPHAGEAL REFLUX DISEASE WITHOUT ESOPHAGITIS: ICD-10-CM

## 2020-03-04 ENCOUNTER — APPOINTMENT (OUTPATIENT)
Dept: NEUROSURGERY | Facility: CLINIC | Age: 50
End: 2020-03-04
Payer: MEDICAID

## 2020-03-04 VITALS — BODY MASS INDEX: 30.24 KG/M2 | HEIGHT: 59 IN | WEIGHT: 150 LBS

## 2020-03-04 DIAGNOSIS — M53.3 SACROCOCCYGEAL DISORDERS, NOT ELSEWHERE CLASSIFIED: ICD-10-CM

## 2020-03-04 PROCEDURE — 99213 OFFICE O/P EST LOW 20 MIN: CPT

## 2020-03-04 RX ORDER — POLYETHYLENE GLYCOL 3350, SODIUM SULFATE ANHYDROUS, SODIUM BICARBONATE, SODIUM CHLORIDE, POTASSIUM CHLORIDE 227.1; 21.5; 6.36; 5.53; .754 G/L; G/L; G/L; G/L; G/L
227.1 POWDER, FOR SOLUTION ORAL
Qty: 1 | Refills: 0 | Status: DISCONTINUED | COMMUNITY
Start: 2019-12-27 | End: 2020-03-04

## 2020-03-04 RX ORDER — FENTANYL 25 UG/H
25 PATCH, EXTENDED RELEASE TRANSDERMAL
Qty: 1 | Refills: 0 | Status: DISCONTINUED | COMMUNITY
Start: 2017-05-30 | End: 2020-03-04

## 2020-03-04 NOTE — ASSESSMENT
[FreeTextEntry1] : We had a thorough discussion regarding her condition. Although SCS trials have been discussed upon prior visits, before considering this she will undergo repeat testing. I would like an MRI of the lumbar spine w/wo gado and an MRI of the cervical spine w/o gado to assess for adjacent segment disease. I would also like EMGs of the upper / lower extremities. I will see her back once these studies are completed.

## 2020-03-04 NOTE — PHYSICAL EXAM
[FreeTextEntry1] : General - well appearing in moderate acute distress. Ambulates with a walker. \par \par HEENT-normocephalic atraumatic\par \par Skin-intact\par \par CV-no edema, good distal pulses, radial and DP\par \par Lungs-no wheezing or cyanosis\par \par Musculoskeletal - decreased ROM of the neck and lower back. Well healed incisions. SLR negative. SI joints tender B/L, + FABERs. \par \par Neuro- awake, alert, and oriented; CN II-XII intact; 5/5 strength; sensation intact; reflexes normal; no Hoffmans or clonus, gait antalgic \par

## 2020-03-04 NOTE — HISTORY OF PRESENT ILLNESS
[FreeTextEntry1] : Ms. Guevara is s/p C3/4 C4/5 ACDF and L5/S1 TLIF which were both performed by Dr. Strange in the past. Despite surgical treatments she continues to have persistent neck and lower back pain. She has numbness and paraesthesias in her hands and feels numbness and burning in her feet. With prolonged sitting she gets the feeling of numbness in her legs, worse in the right leg. \par \par Her prior imaging studies showed stable fusion at C3/4 C4/5 and L5/S1. There was mild adjacent segment spondylosis. Given these findings, SCS trials were recommended. Unfortunately she had to change her pain specialist due to insurance reasons recently. She is now under the care of Dr. Horton. She is medically managed on Morphine, Percocet, Gabapentin, and Zanaflex PRN. He recommended she follow up with me today to discuss possible surgery rather then a SCS.

## 2020-03-12 ENCOUNTER — RESULT REVIEW (OUTPATIENT)
Age: 50
End: 2020-03-12

## 2020-03-12 ENCOUNTER — TRANSCRIPTION ENCOUNTER (OUTPATIENT)
Age: 50
End: 2020-03-12

## 2020-03-12 ENCOUNTER — OUTPATIENT (OUTPATIENT)
Dept: OUTPATIENT SERVICES | Facility: HOSPITAL | Age: 50
LOS: 1 days | Discharge: HOME | End: 2020-03-12
Payer: MEDICAID

## 2020-03-12 VITALS
RESPIRATION RATE: 17 BRPM | OXYGEN SATURATION: 95 % | SYSTOLIC BLOOD PRESSURE: 126 MMHG | HEART RATE: 63 BPM | DIASTOLIC BLOOD PRESSURE: 71 MMHG

## 2020-03-12 VITALS
DIASTOLIC BLOOD PRESSURE: 51 MMHG | WEIGHT: 149.91 LBS | RESPIRATION RATE: 18 BRPM | HEART RATE: 63 BPM | TEMPERATURE: 98 F | SYSTOLIC BLOOD PRESSURE: 116 MMHG

## 2020-03-12 DIAGNOSIS — Z98.890 OTHER SPECIFIED POSTPROCEDURAL STATES: Chronic | ICD-10-CM

## 2020-03-12 PROCEDURE — 45385 COLONOSCOPY W/LESION REMOVAL: CPT

## 2020-03-12 PROCEDURE — 45380 COLONOSCOPY AND BIOPSY: CPT | Mod: 59

## 2020-03-12 PROCEDURE — 88305 TISSUE EXAM BY PATHOLOGIST: CPT | Mod: 26

## 2020-03-12 RX ORDER — METHENAMINE MANDELATE 1 G
0 TABLET ORAL
Qty: 0 | Refills: 0 | DISCHARGE

## 2020-03-13 NOTE — PLAN
[FreeTextEntry1] : #chronic back pain s/p lumbar fusion\par - c/w pain regimen as per pain management\par - being considered for spinal cord stimulator with her new pain management doctor\par \par #GERD\par - will return to omeprazole 40\par - has had some improvement over the last couple months with higher dosage of PPI\par - currently asymptomatic when she is taking her PPI\par \par # allergies\par - renew benadryl, fluticasone nasal spray\par \par #depression\par -controlled \par -c/w escitalopram\par -follow up with psychiatry \par \par # asthma\par - c/w proair, montelukast \par \par #Constipation\par - cont with current regiment\par - is being scheduled for colonoscopy\par \par #HCM\par - refused flu shot again. will defer. \par - Last PAP in 2019\par - screening mammo BIRADS 2 doen in Dec 2019\par \par . \par

## 2020-03-13 NOTE — HISTORY OF PRESENT ILLNESS
[de-identified] : 48y/o F with PMH asthma, allergic rhinitis, depression/anxiety, vit D def, chronic back/neck pain presenting for follow-up. She has no new complaints. Her nerve pain (chronic back and neck pain) is still present. She is being considered for spinal cord stimulator with her new pain specialist doctor. She needs to complete the neuropsychology testing first. Her heart burn has improved from increased PPI dosing.

## 2020-03-15 ENCOUNTER — OUTPATIENT (OUTPATIENT)
Dept: OUTPATIENT SERVICES | Facility: HOSPITAL | Age: 50
LOS: 1 days | Discharge: HOME | End: 2020-03-15
Payer: MEDICAID

## 2020-03-15 ENCOUNTER — RESULT REVIEW (OUTPATIENT)
Age: 50
End: 2020-03-15

## 2020-03-15 DIAGNOSIS — Z98.890 OTHER SPECIFIED POSTPROCEDURAL STATES: Chronic | ICD-10-CM

## 2020-03-15 DIAGNOSIS — M54.2 CERVICALGIA: ICD-10-CM

## 2020-03-15 DIAGNOSIS — M47.812 SPONDYLOSIS WITHOUT MYELOPATHY OR RADICULOPATHY, CERVICAL REGION: ICD-10-CM

## 2020-03-15 LAB — SURGICAL PATHOLOGY STUDY: SIGNIFICANT CHANGE UP

## 2020-03-15 PROCEDURE — 72141 MRI NECK SPINE W/O DYE: CPT | Mod: 26

## 2020-03-15 PROCEDURE — 72158 MRI LUMBAR SPINE W/O & W/DYE: CPT | Mod: 26

## 2020-03-18 DIAGNOSIS — F43.10 POST-TRAUMATIC STRESS DISORDER, UNSPECIFIED: ICD-10-CM

## 2020-03-18 DIAGNOSIS — K57.30 DIVERTICULOSIS OF LARGE INTESTINE WITHOUT PERFORATION OR ABSCESS WITHOUT BLEEDING: ICD-10-CM

## 2020-03-18 DIAGNOSIS — Z12.11 ENCOUNTER FOR SCREENING FOR MALIGNANT NEOPLASM OF COLON: ICD-10-CM

## 2020-03-18 DIAGNOSIS — K55.20 ANGIODYSPLASIA OF COLON WITHOUT HEMORRHAGE: ICD-10-CM

## 2020-03-18 DIAGNOSIS — K64.4 RESIDUAL HEMORRHOIDAL SKIN TAGS: ICD-10-CM

## 2020-03-18 DIAGNOSIS — K64.8 OTHER HEMORRHOIDS: ICD-10-CM

## 2020-03-18 DIAGNOSIS — J45.909 UNSPECIFIED ASTHMA, UNCOMPLICATED: ICD-10-CM

## 2020-03-18 DIAGNOSIS — K63.3 ULCER OF INTESTINE: ICD-10-CM

## 2020-03-18 DIAGNOSIS — F32.9 MAJOR DEPRESSIVE DISORDER, SINGLE EPISODE, UNSPECIFIED: ICD-10-CM

## 2020-03-18 DIAGNOSIS — D12.0 BENIGN NEOPLASM OF CECUM: ICD-10-CM

## 2020-04-06 NOTE — ED PROVIDER NOTE - PROVIDER TOKENS
VM MSg-->    Pt left  requesting abx for sinus infection. Would like call back. PROVIDER:[TOKEN:[[id number]],FOLLOWUP:[1-3 Days]]

## 2020-05-12 ENCOUNTER — APPOINTMENT (OUTPATIENT)
Dept: INTERNAL MEDICINE | Facility: CLINIC | Age: 50
End: 2020-05-12
Payer: MEDICAID

## 2020-05-12 ENCOUNTER — APPOINTMENT (OUTPATIENT)
Dept: INTERNAL MEDICINE | Facility: CLINIC | Age: 50
End: 2020-05-12

## 2020-05-12 PROCEDURE — 99213 OFFICE O/P EST LOW 20 MIN: CPT | Mod: GC,95

## 2020-06-19 ENCOUNTER — OUTPATIENT (OUTPATIENT)
Dept: OUTPATIENT SERVICES | Facility: HOSPITAL | Age: 50
LOS: 1 days | Discharge: HOME | End: 2020-06-19

## 2020-06-19 DIAGNOSIS — Z98.890 OTHER SPECIFIED POSTPROCEDURAL STATES: Chronic | ICD-10-CM

## 2020-06-23 DIAGNOSIS — M47.9 SPONDYLOSIS, UNSPECIFIED: ICD-10-CM

## 2020-06-23 DIAGNOSIS — M47.812 SPONDYLOSIS WITHOUT MYELOPATHY OR RADICULOPATHY, CERVICAL REGION: ICD-10-CM

## 2020-06-29 ENCOUNTER — APPOINTMENT (OUTPATIENT)
Dept: NEUROSURGERY | Facility: CLINIC | Age: 50
End: 2020-06-29

## 2020-06-29 ENCOUNTER — APPOINTMENT (OUTPATIENT)
Dept: NEUROSURGERY | Facility: CLINIC | Age: 50
End: 2020-06-29
Payer: MEDICAID

## 2020-06-29 VITALS — WEIGHT: 150 LBS | BODY MASS INDEX: 30.24 KG/M2 | HEIGHT: 59 IN

## 2020-06-29 DIAGNOSIS — M47.9 SPONDYLOSIS, UNSPECIFIED: ICD-10-CM

## 2020-06-29 PROCEDURE — 99214 OFFICE O/P EST MOD 30 MIN: CPT

## 2020-07-02 PROBLEM — M47.9 DEGENERATIVE JOINT DISEASE OF SPINE: Status: ACTIVE | Noted: 2019-09-17

## 2020-07-14 ENCOUNTER — OUTPATIENT (OUTPATIENT)
Dept: OUTPATIENT SERVICES | Facility: HOSPITAL | Age: 50
LOS: 1 days | Discharge: HOME | End: 2020-07-14

## 2020-07-14 ENCOUNTER — APPOINTMENT (OUTPATIENT)
Dept: INTERNAL MEDICINE | Facility: CLINIC | Age: 50
End: 2020-07-14
Payer: MEDICAID

## 2020-07-14 VITALS
WEIGHT: 152 LBS | DIASTOLIC BLOOD PRESSURE: 62 MMHG | BODY MASS INDEX: 30.64 KG/M2 | HEIGHT: 59 IN | RESPIRATION RATE: 74 BRPM | HEART RATE: 74 BPM | SYSTOLIC BLOOD PRESSURE: 121 MMHG | TEMPERATURE: 98.1 F

## 2020-07-14 DIAGNOSIS — Z98.890 OTHER SPECIFIED POSTPROCEDURAL STATES: Chronic | ICD-10-CM

## 2020-07-14 DIAGNOSIS — J30.9 ALLERGIC RHINITIS, UNSPECIFIED: ICD-10-CM

## 2020-07-14 DIAGNOSIS — H10.10 ALLERGIC RHINITIS, UNSPECIFIED: ICD-10-CM

## 2020-07-14 LAB
25(OH)D3 SERPL-MCNC: 14 NG/ML
BASOPHILS # BLD AUTO: 0.03 K/UL
BASOPHILS NFR BLD AUTO: 0.5 %
CHOLEST SERPL-MCNC: 192 MG/DL
CHOLEST/HDLC SERPL: 3.4 RATIO
EOSINOPHIL # BLD AUTO: 0.08 K/UL
EOSINOPHIL NFR BLD AUTO: 1.2 %
ESTIMATED AVERAGE GLUCOSE: 123 MG/DL
HBA1C MFR BLD HPLC: 5.9 %
HCT VFR BLD CALC: 40.2 %
HDLC SERPL-MCNC: 56 MG/DL
HGB BLD-MCNC: 12.6 G/DL
IMM GRANULOCYTES NFR BLD AUTO: 0.3 %
LDLC SERPL CALC-MCNC: 125 MG/DL
LYMPHOCYTES # BLD AUTO: 2.76 K/UL
LYMPHOCYTES NFR BLD AUTO: 42.5 %
MAN DIFF?: NORMAL
MCHC RBC-ENTMCNC: 29.1 PG
MCHC RBC-ENTMCNC: 31.3 G/DL
MCV RBC AUTO: 92.8 FL
MONOCYTES # BLD AUTO: 0.41 K/UL
MONOCYTES NFR BLD AUTO: 6.3 %
NEUTROPHILS # BLD AUTO: 3.2 K/UL
NEUTROPHILS NFR BLD AUTO: 49.2 %
PLATELET # BLD AUTO: 169 K/UL
RBC # BLD: 4.33 M/UL
RBC # FLD: 12.7 %
TRIGL SERPL-MCNC: 105 MG/DL
TSH SERPL-ACNC: 2.6 UIU/ML
WBC # FLD AUTO: 6.5 K/UL

## 2020-07-14 PROCEDURE — 99213 OFFICE O/P EST LOW 20 MIN: CPT | Mod: GC

## 2020-07-14 RX ORDER — POLYETHYLENE GLYCOL 3350 17 G/17G
17 POWDER, FOR SOLUTION ORAL DAILY
Qty: 510 | Refills: 0 | Status: DISCONTINUED | COMMUNITY
Start: 2019-12-27 | End: 2020-07-14

## 2020-07-14 RX ORDER — DICYCLOMINE HYDROCHLORIDE 10 MG/1
10 CAPSULE ORAL 3 TIMES DAILY
Qty: 90 | Refills: 3 | Status: DISCONTINUED | COMMUNITY
Start: 2019-12-27 | End: 2020-07-14

## 2020-07-14 NOTE — ASSESSMENT
[FreeTextEntry1] : \par \par #chronic back pain s/p lumbar fusion\par - c/w pain regimen as per pain management\par - being considered for spinal cord stimulator with her new pain management doctor\par \par #GERD\par - will return to omeprazole 40\par - stable on Tx\par \par \par # allergies\par - on benadryl, fluticasone nasal spray\par \par #depression\par -controlled \par -c/w escitalopram\par -follow up with psychiatry \par \par # asthma\par - on proair, and montelukast \par - reports feeling stable\par \par #Constipation\par - cont with current regiment\par - due to chronic opiate use\par \par \par # Tubular adenoma\par - s/p coloscopy in March 2020\par - repeat coloscopy in 3-5 years\par \par #HCM\par - refused flu shot again. will defer. \par - Last PAP in 2019\par - next mammo Dec 2020\par - routine labs next visit

## 2020-07-14 NOTE — PHYSICAL EXAM
[No Acute Distress] : no acute distress [Well Nourished] : well nourished [Normal Sclera/Conjunctiva] : normal sclera/conjunctiva [EOMI] : extraocular movements intact [Normal Outer Ear/Nose] : the outer ears and nose were normal in appearance [Supple] : supple [No JVD] : no jugular venous distention [No Respiratory Distress] : no respiratory distress  [Clear to Auscultation] : lungs were clear to auscultation bilaterally [Normal Rate] : normal rate  [Regular Rhythm] : with a regular rhythm [No Edema] : there was no peripheral edema [Soft] : abdomen soft [Non Tender] : non-tender [Non-distended] : non-distended [No Spinal Tenderness] : no spinal tenderness [No CVA Tenderness] : no CVA  tenderness [Grossly Normal Strength/Tone] : grossly normal strength/tone [No Rash] : no rash [No Focal Deficits] : no focal deficits [Coordination Grossly Intact] : coordination grossly intact [Normal Affect] : the affect was normal

## 2020-07-14 NOTE — HISTORY OF PRESENT ILLNESS
[Home] : at home, [unfilled] , at the time of the visit. [Medical Office: ([facility])___] : at the medical office located in  [Patient] : the patient [de-identified] : 49 year old female presenting for follow up \par she is still complaining of back pain and neck pain \par - she is stating that her pain is controlled with her recent pain management plan but the eventual plan is to decrease pain medications

## 2020-07-14 NOTE — REVIEW OF SYSTEMS
[Heartburn] : heartburn [Back Pain] : back pain [Fever] : no fever [Chills] : no chills [Fatigue] : no fatigue [Discharge] : no discharge [Pain] : no pain [Redness] : no redness [Earache] : no earache [Hearing Loss] : no hearing loss [Nosebleed] : no nosebleeds [Chest Pain] : no chest pain [Palpitations] : no palpitations [Abdominal Pain] : no abdominal pain [Nausea] : no nausea [Headache] : no headache [Suicidal] : not suicidal [Easy Bleeding] : no easy bleeding

## 2020-07-14 NOTE — PLAN
[FreeTextEntry1] : 49 year old female known to have chronic back pain, allergic rhinoconjunctivitis, Depression, cervical spondylosis s/p ACDF, GERD, presenting for follow up visit. \par \par \par #chronic back pain s/p lumbar fusion\par - Pain Mx referral\par - c/w pain regimen as per pain management\par - MRI lumbar system done, CT scheduled, fu with Neuro[facility] afterwards\par \par # Cervical spondylosis\par - fu with neurosx\par - cw pain Mx\par \par \par #GERD\par - cw omeprazole 40\par - stable on Tx\par \par \par # allergies\par - on benadryl, fluticasone nasal spray\par \par #depression\par -controlled \par -c/w escitalopram, trazodone\par -follow up with psychiatry \par \par # asthma\par - on proair, and montelukast \par - reports feeling stable\par \par #Constipation\par - cont with current regiment\par - due to chronic opiate use\par \par \par # Tubular adenoma\par - s/p coloscopy in March 2020\par - repeat coloscopy in 3-5 years\par \par \par #HCM\par - Last PAP in 2019\par - next mammo Dec 2020\par - routine labs next visit. \par

## 2020-07-14 NOTE — HISTORY OF PRESENT ILLNESS
[FreeTextEntry1] : follow up visit [de-identified] : 49 year old female known to have chronic back pain, allergic rhinoconjunctivitis, Depression, cervical spondylosis s/p ACDF, GERD, presenting for follow up visit. \par she is still complaining of back pain, was seen by NeuroSx and had MRI lumbar spine done. Pt is scheduled to have CT scan and then follow up with Neuro[facility]. Pt recently had to change her Pain Mx Dr due to insurance issues and wants the referral for the prior Dr, Dr Alonso strong. Pt states new Dr Started her on morphine which does not help. She was taking previously percocet with lidocaine patch which tremendously helped her.

## 2020-07-18 NOTE — ASU PREOP CHECKLIST - TAMPON REMOVED
Assessment/Plan:       Diagnoses and all orders for this visit:    Breast lump on right side at 12 o'clock position  -     US breast screening bilateral complete (ABUS); Future    Breast lump on right side at 11 o'clock position  -     US breast screening bilateral complete (ABUS); Future    Family history of breast cancer  -     US breast screening bilateral complete (ABUS); Future        No problem-specific Assessment & Plan notes found for this encounter  Subjective:      Patient ID: Scout Solis is a 31 y o  female  Patient noticed lumps in both sides of breasts  She has some tenderness and continues to have tenderness  She reports that she has not had unprotected sexual intercourse  LMP was 7 days ago      The following portions of the patient's history were reviewed and updated as appropriate:   She has a past medical history of Allergic, Anxiety, Asthma, Migraine, and Pneumonia ,  does not have any pertinent problems on file  ,   has a past surgical history that includes  section and Femur fracture surgery  ,  family history includes Asthma in her daughter and son; Diabetes in her mother; Hypertension in her father  ,   reports that she has been smoking cigarettes  She has been smoking about 0 25 packs per day  She has never used smokeless tobacco  She reports that she does not drink alcohol or use drugs  ,  is allergic to diphenhydramine; bee venom; ciprofloxacin; doxycycline; and sulfasalazine     Current Outpatient Medications   Medication Sig Dispense Refill    albuterol (PROVENTIL HFA,VENTOLIN HFA) 90 mcg/act inhaler INHALE 2 PUFFS EVERY 6 HOURS AS NEEDED FOR WHEEZING 18 g 0    busPIRone (BUSPAR) 5 mg tablet Take 1 tablet (5 mg total) by mouth 2 (two) times a day 180 tablet 3    ELIQUIS 5 MG TAKE 1 TABLET (5 MG TOTAL) BY MOUTH 2 (TWO) TIMES A DAY 60 tablet 0    FLOVENT  MCG/ACT inhaler INHALE 1 PUFF 2 (TWO) TIMES A DAY 1 Inhaler 1    gabapentin (NEURONTIN) 100 mg capsule Take 1 capsule (100 mg total) by mouth 2 (two) times a day 60 capsule 2    montelukast (SINGULAIR) 10 mg tablet TAKE 1 TABLET BY MOUTH DAILY AT BEDTIME 90 tablet 3     No current facility-administered medications for this visit  Review of Systems   Constitutional: Negative  Negative for fatigue and fever  HENT: Negative  Negative for congestion  Eyes: Negative  Negative for visual disturbance  Respiratory: Negative for cough, chest tightness, shortness of breath and wheezing  Cardiovascular: Negative  Gastrointestinal: Negative  Negative for abdominal pain, blood in stool, diarrhea and nausea  Endocrine: Negative for polydipsia, polyphagia and polyuria  Genitourinary: Negative for difficulty urinating and flank pain  Musculoskeletal: Negative  Negative for arthralgias, back pain and myalgias  Skin: Negative for color change, pallor and rash  Lumps in breasts   Allergic/Immunologic: Negative for immunocompromised state  Neurological: Negative  Negative for dizziness, weakness, light-headedness, numbness and headaches  Hematological: Negative for adenopathy  Psychiatric/Behavioral: Negative  Negative for confusion, decreased concentration and sleep disturbance  All other systems reviewed and are negative  Objective:  Vitals:    07/20/20 1158   BP: 124/84   BP Location: Left arm   Patient Position: Sitting   Pulse: 88   Resp: 18   SpO2: 98%   Weight: 73 5 kg (162 lb)   Height: 5' 6" (1 676 m)     Body mass index is 26 15 kg/m²  Physical Exam   Constitutional: She is oriented to person, place, and time  She appears well-developed and well-nourished  No distress  HENT:   Head: Normocephalic and atraumatic  Nose: Nose normal    Mouth/Throat: No oropharyngeal exudate  Eyes: Pupils are equal, round, and reactive to light  Conjunctivae are normal  No scleral icterus  Neck: Normal range of motion  Neck supple  No JVD present     Cardiovascular: Normal rate, regular rhythm and normal heart sounds  Exam reveals no gallop and no friction rub  No murmur heard  Pulmonary/Chest: Effort normal and breath sounds normal  No respiratory distress  Abdominal: Soft  Musculoskeletal: Normal range of motion  She exhibits no edema  Lymphadenopathy:     She has no cervical adenopathy  Neurological: She is alert and oriented to person, place, and time  Coordination normal    Skin: Skin is warm and dry  No rash noted  She is not diaphoretic  Skin is sunburned  Nipple piercing  Right breast- dense breast tissue  Discomfort upon palpation at 12 oclock position  Left breast- dense breast tissue  Discomfort at palpation at the 11 oclock position   Psychiatric: She has a normal mood and affect  Her behavior is normal  Judgment and thought content normal    Nursing note and vitals reviewed  n/a

## 2020-07-20 ENCOUNTER — OUTPATIENT (OUTPATIENT)
Dept: OUTPATIENT SERVICES | Facility: HOSPITAL | Age: 50
LOS: 1 days | Discharge: HOME | End: 2020-07-20
Payer: MEDICAID

## 2020-07-20 DIAGNOSIS — Z98.890 OTHER SPECIFIED POSTPROCEDURAL STATES: Chronic | ICD-10-CM

## 2020-07-20 DIAGNOSIS — M54.9 DORSALGIA, UNSPECIFIED: ICD-10-CM

## 2020-07-20 PROCEDURE — 72131 CT LUMBAR SPINE W/O DYE: CPT | Mod: 26

## 2020-07-20 PROCEDURE — 72110 X-RAY EXAM L-2 SPINE 4/>VWS: CPT | Mod: 26

## 2020-07-29 DIAGNOSIS — J45.909 UNSPECIFIED ASTHMA, UNCOMPLICATED: ICD-10-CM

## 2020-07-29 DIAGNOSIS — J30.9 ALLERGIC RHINITIS, UNSPECIFIED: ICD-10-CM

## 2020-07-29 DIAGNOSIS — M47.812 SPONDYLOSIS WITHOUT MYELOPATHY OR RADICULOPATHY, CERVICAL REGION: ICD-10-CM

## 2020-07-29 DIAGNOSIS — M54.9 DORSALGIA, UNSPECIFIED: ICD-10-CM

## 2020-07-29 DIAGNOSIS — K59.00 CONSTIPATION, UNSPECIFIED: ICD-10-CM

## 2020-07-29 DIAGNOSIS — K21.9 GASTRO-ESOPHAGEAL REFLUX DISEASE WITHOUT ESOPHAGITIS: ICD-10-CM

## 2020-08-01 NOTE — PHYSICAL EXAM
[FreeTextEntry1] : Pt has TTP over trapezius muscles.  She has symmetric facial muscles and full EOM's.\par Patient has full strength in upper extremities.  She is able to support her own weight and ambulate but uses a quad cane.  She has tinel's bilaterally at wrists and + phalen LLE.\par When asked to pretend to brush her hair with brush she adjusts her hair with her hand, however correctly demonstrates how to brush her teeth.\par She is able to draw a clock but has to ask examiner to repeat the time then reverses short and long hand. When asked which was which she notices error and corrects it.\par She has not heard of the proverbs "stitch in time saves nine" and "grass is always greener on the other side".
No

## 2020-09-18 ENCOUNTER — APPOINTMENT (OUTPATIENT)
Dept: INTERNAL MEDICINE | Facility: CLINIC | Age: 50
End: 2020-09-18
Payer: MEDICAID

## 2020-09-18 ENCOUNTER — OUTPATIENT (OUTPATIENT)
Dept: OUTPATIENT SERVICES | Facility: HOSPITAL | Age: 50
LOS: 1 days | Discharge: HOME | End: 2020-09-18

## 2020-09-18 VITALS
BODY MASS INDEX: 30.24 KG/M2 | TEMPERATURE: 98.1 F | HEART RATE: 94 BPM | DIASTOLIC BLOOD PRESSURE: 73 MMHG | WEIGHT: 150 LBS | SYSTOLIC BLOOD PRESSURE: 136 MMHG | HEIGHT: 59 IN

## 2020-09-18 DIAGNOSIS — Z98.890 OTHER SPECIFIED POSTPROCEDURAL STATES: Chronic | ICD-10-CM

## 2020-09-18 DIAGNOSIS — K59.00 CONSTIPATION, UNSPECIFIED: ICD-10-CM

## 2020-09-18 PROCEDURE — 99213 OFFICE O/P EST LOW 20 MIN: CPT | Mod: GC

## 2020-09-18 RX ORDER — MORPHINE SULFATE 30 MG/1
TABLET ORAL
Refills: 0 | Status: DISCONTINUED | COMMUNITY
End: 2020-09-18

## 2020-09-18 RX ORDER — CHOLECALCIFEROL (VITAMIN D3) 1250 MCG
1.25 MG CAPSULE ORAL
Qty: 12 | Refills: 3 | Status: DISCONTINUED | COMMUNITY
Start: 2020-07-14 | End: 2020-09-18

## 2020-09-18 RX ORDER — OXYCODONE HYDROCHLORIDE AND ACETAMINOPHEN 5; 325 MG/1; MG/1
5-325 TABLET ORAL
Refills: 0 | Status: DISCONTINUED | COMMUNITY
Start: 2017-08-22 | End: 2020-09-18

## 2020-09-18 RX ORDER — ESCITALOPRAM OXALATE 20 MG/1
20 TABLET ORAL
Qty: 30 | Refills: 4 | Status: DISCONTINUED | COMMUNITY
Start: 2017-05-18 | End: 2020-09-18

## 2020-09-18 NOTE — ASSESSMENT
[FreeTextEntry1] : 50 year old female known to have chronic back pain, allergic rhinoconjunctivitis, Depression, cervical spondylosis s/p ACDF, GERD, presenting for follow up visit. \par \par \par #chronic back pain s/p lumbar fusion\par - Pain management appointment pending\par - c/w pain regimen as per pain management\par - MRI, CT lumbar system completed, Patient following with neuro-surgery\par - pain management gave her methylprednisolone 4 mg daily, cautioned pt of side effects\par \par # Cervical spondylosis\par - fu with neurosx\par - cw pain Mx\par \par #GERD\par - cw omeprazole 40\par - stable on Tx\par \par # allergies\par - on benadryl, fluticasone nasal spray\par \par #depression\par -controlled \par -Patient reports she stopped escitalopram, trazodone per psych\par -follow up with psychiatry \par \par # asthma\par - on proair, and montelukast \par - reports feeling stable\par \par #Constipation\par - cont with current regiment\par - due to chronic opiate use\par \par # Tubular adenoma\par - s/p coloscopy in March 2020\par - repeat coloscopy in 3-5 years\par \par \par #HCM\par - Last PAP in 2019\par - next mammo Dec 2020, ordered\par - routine labs\par - Patient declined flu shot, said she will get next visit\par

## 2020-09-18 NOTE — HISTORY OF PRESENT ILLNESS
[FreeTextEntry1] : F/U [de-identified] : 50 year old female known to have chronic back pain, allergic rhinoconjunctivitis, Depression, cervical spondylosis s/p ACDF, GERD, presenting for follow up visit. \par Dhe is still complaining of back pain, was seen by NeuroSx and had MRI and CT lumbar spine done. Per neurosx surgery will probably not be effective but is still an option; she is still considering it.\par She just switched to new pain doctor due to insurance issues, Dr Vera; appointment is pending. She was taking previously percocet with lidocaine patch which tremendously helped her. \par \par Otherwise she denies any complaints

## 2020-09-18 NOTE — PHYSICAL EXAM
[No Acute Distress] : no acute distress [Normal Sclera/Conjunctiva] : normal sclera/conjunctiva [Normal Outer Ear/Nose] : the outer ears and nose were normal in appearance [No Respiratory Distress] : no respiratory distress  [No Accessory Muscle Use] : no accessory muscle use [Clear to Auscultation] : lungs were clear to auscultation bilaterally [Normal Rate] : normal rate  [Regular Rhythm] : with a regular rhythm [Normal S1, S2] : normal S1 and S2 [No Edema] : there was no peripheral edema [No Focal Deficits] : no focal deficits [Normal Affect] : the affect was normal [Alert and Oriented x3] : oriented to person, place, and time [Normal Insight/Judgement] : insight and judgment were intact [de-identified] : spinal tenderness

## 2020-09-22 DIAGNOSIS — K59.00 CONSTIPATION, UNSPECIFIED: ICD-10-CM

## 2020-09-22 DIAGNOSIS — M47.812 SPONDYLOSIS WITHOUT MYELOPATHY OR RADICULOPATHY, CERVICAL REGION: ICD-10-CM

## 2020-09-22 DIAGNOSIS — M54.9 DORSALGIA, UNSPECIFIED: ICD-10-CM

## 2020-09-22 DIAGNOSIS — Z00.00 ENCOUNTER FOR GENERAL ADULT MEDICAL EXAMINATION WITHOUT ABNORMAL FINDINGS: ICD-10-CM

## 2020-09-22 LAB — BACTERIA UR CULT: NORMAL

## 2020-09-22 NOTE — ED ADULT NURSE NOTE - ATTEMPT TO OOB
Immediate Post OP Note    PreOp Diagnosis: humaira-anal mass    PostOp Diagnosis: humaira-anal mass    Procedure(s):  EXAM UNDER ANESTHESIA - ANAL BIOPSY-incisional  BIOPSY, RECTUM    Surgeon(s):  Jaime Tuttle M.D.    Anesthesiologist/Type of Anesthesia:  Anesthesiologist: Mary Stanton M.D./* No anesthesia type entered *    Surgical Staff:  Circulator: Anita A Reyes, R.N.; Vidya Zapata R.N.  Scrub Person: Demetrice Dinero    Specimens removed if any:  * No specimens in log *    Estimated Blood Loss: 20 cc    Findings: plaque like mass    Complications: none        9/22/2020 10:06 AM Jaime Tuttle M.D.       no

## 2020-10-06 ENCOUNTER — NON-APPOINTMENT (OUTPATIENT)
Age: 50
End: 2020-10-06

## 2020-10-21 ENCOUNTER — OUTPATIENT (OUTPATIENT)
Dept: OUTPATIENT SERVICES | Facility: HOSPITAL | Age: 50
LOS: 1 days | Discharge: HOME | End: 2020-10-21

## 2020-10-21 ENCOUNTER — APPOINTMENT (OUTPATIENT)
Dept: PAIN MANAGEMENT | Facility: CLINIC | Age: 50
End: 2020-10-21
Payer: MEDICAID

## 2020-10-21 VITALS
DIASTOLIC BLOOD PRESSURE: 87 MMHG | WEIGHT: 150 LBS | HEIGHT: 59 IN | BODY MASS INDEX: 30.24 KG/M2 | SYSTOLIC BLOOD PRESSURE: 155 MMHG | TEMPERATURE: 97.7 F | HEART RATE: 64 BPM

## 2020-10-21 DIAGNOSIS — Z98.890 OTHER SPECIFIED POSTPROCEDURAL STATES: Chronic | ICD-10-CM

## 2020-10-21 PROCEDURE — 99203 OFFICE O/P NEW LOW 30 MIN: CPT | Mod: GC

## 2020-10-21 NOTE — HISTORY OF PRESENT ILLNESS
[de-identified] : 50 year old female with worsening lower back pain and lumbar radicular pain.  The patient notes pain since 2010 after fall.  The patient had surgery 2018.  Patient had lumbar fusion.  Additionally, patient had ACDF in 2014.  Patient has tried and failed several rounds of injections before and after surgery.  She was on opioid medications, which she stopped 1 month ago.  The patient is currently on an oral steriod pack with limited relief.  The patient has had follow up neurosurgical evaluation that suggested SCS trial.  [Pain Location] : pain [] : from right & left buttock to right & left foot [Lumbar] : lumbar region [Worsening] : worsening [9] : a current pain level of 9/10 [Bending] : worsened by bending [Lifting] : worsened by lifting [Prolonged Sitting] : worsened by prolonged sitting [Prolonged Standing] : worsened by prolonged standing [Sitting] : worsened by sitting [Standing] : worsened by standing [Walking] : worsened by walking [Weight Bearing] : worsened by weight bearing [Acetaminophen] : not relieved by acetaminophen [NSAIDs] : not relieved by nonsteroidal anti-inflammatory drugs [Opioid Analgesics] : not relieved by opioid analgesics [Physical Therapy] : not relieved by physical therapy [Ataxia] : no ataxia [Incontinence] : no incontinence

## 2020-10-21 NOTE — PHYSICAL EXAM
[Antalgic] : antalgic [Shuffling] : shuffling [Walker] : ambulates with walker [SLR] : positive straight leg raise [UE/LE] : Sensory: Intact in bilateral upper & lower extremities [ALL] : dorsalis pedis, posterior tibial, femoral, popliteal, and radial 2+ and symmetric bilaterally [Normal RUE] : Right Upper Extremity: No scars, rashes, lesions, ulcers, skin intact [Normal LUE] : Left Upper Extremity: No scars, rashes, lesions, ulcers, skin intact [Normal RLE] : Right Lower Extremity: No scars, rashes, lesions, ulcers, skin intact [Normal LLE] : Left Lower Extremity: No scars, rashes, lesions, ulcers, skin intact [Normal Finger/nose] : finger to nose coordination [Normal Heel/Knee/Shin] : heel to knee to shin coordination [Normal ROYA] : rapid alternative movement normal [Normal DTR Reflexes] : DTR reflexes normal [Normal Touch] : sensation intact for touch [Normal Pin] : sensation intact for pin [Normal Vibration] : sensation intact for vibration [Normal Proprioception] : sensation intact for proprioception [Normal] : Normal bowel sounds, soft, non-tender, no hepato-splenomegaly and no abdominal mass palpated

## 2020-10-21 NOTE — DISCUSSION/SUMMARY
[de-identified] : 1.  The patient was referred for evaluation by neurosurgery for possible SCS trial.  The patient has tried and failed conservative, opioid, infectional and surgical therapies.  The patient is a good candidate for SCS trial.  Risks and benefits of the trial were discussed. At this time we will request to proceed with the trial.

## 2020-10-22 ENCOUNTER — APPOINTMENT (OUTPATIENT)
Dept: NEUROSURGERY | Facility: CLINIC | Age: 50
End: 2020-10-22

## 2020-11-05 ENCOUNTER — NON-APPOINTMENT (OUTPATIENT)
Age: 50
End: 2020-11-05

## 2020-11-06 ENCOUNTER — OUTPATIENT (OUTPATIENT)
Dept: OUTPATIENT SERVICES | Facility: HOSPITAL | Age: 50
LOS: 1 days | Discharge: HOME | End: 2020-11-06

## 2020-11-06 ENCOUNTER — APPOINTMENT (OUTPATIENT)
Dept: INTERNAL MEDICINE | Facility: CLINIC | Age: 50
End: 2020-11-06
Payer: MEDICAID

## 2020-11-06 VITALS
TEMPERATURE: 97.6 F | HEART RATE: 59 BPM | DIASTOLIC BLOOD PRESSURE: 73 MMHG | BODY MASS INDEX: 29.64 KG/M2 | WEIGHT: 147 LBS | SYSTOLIC BLOOD PRESSURE: 121 MMHG | HEIGHT: 59 IN

## 2020-11-06 DIAGNOSIS — Z98.890 OTHER SPECIFIED POSTPROCEDURAL STATES: Chronic | ICD-10-CM

## 2020-11-06 DIAGNOSIS — Z23 ENCOUNTER FOR IMMUNIZATION: ICD-10-CM

## 2020-11-06 PROCEDURE — 99213 OFFICE O/P EST LOW 20 MIN: CPT | Mod: GC

## 2020-11-06 NOTE — HISTORY OF PRESENT ILLNESS
[FreeTextEntry1] : Back pain [de-identified] : 50 year old female patient presenting for back pain. \par \par Known with worsening lower back pain and lumbar radicular pain since 2010 after a fall.  Patient has tried and failed several rounds of injections before and after surgery, then ACDF in 2014 and lumbar fusion in 2018.\par She was on opioid medications, which she stopped 1 month ago. The patient finished a one time fill of oral methylprednisolone which gave her limited relief. The patient has had follow up neurosurgical evaluation that suggested SCS trial. \par Pain levels include a current pain level of 9/10. \par Modifying factors - worsened by bending, worsened by lifting, worsened by prolonged sitting, worsened by prolonged standing, worsened by sitting, worsened by standing, worsened by walking and worsened by weight bearing. Relieving factors include not relieved by acetaminophen, not relieved by nonsteroidal anti-inflammatory drugs, not relieved by opioid analgesics and not relieved by physical therapy. \par Associated Symptoms: no ataxia and no incontinence. \par

## 2020-11-06 NOTE — PHYSICAL EXAM
[No Acute Distress] : no acute distress [Well Nourished] : well nourished [Normal Sclera/Conjunctiva] : normal sclera/conjunctiva [Normal Outer Ear/Nose] : the outer ears and nose were normal in appearance [Normal Oropharynx] : the oropharynx was normal [No JVD] : no jugular venous distention [No Lymphadenopathy] : no lymphadenopathy [Supple] : supple [No Respiratory Distress] : no respiratory distress  [No Accessory Muscle Use] : no accessory muscle use [Clear to Auscultation] : lungs were clear to auscultation bilaterally [Normal Rate] : normal rate  [Regular Rhythm] : with a regular rhythm [Normal S1, S2] : normal S1 and S2 [No Murmur] : no murmur heard [No Carotid Bruits] : no carotid bruits [Soft] : abdomen soft [Non Tender] : non-tender [Normal Bowel Sounds] : normal bowel sounds [Normal Anterior Cervical Nodes] : no anterior cervical lymphadenopathy [Grossly Normal Strength/Tone] : grossly normal strength/tone [Coordination Grossly Intact] : coordination grossly intact [Normal Insight/Judgement] : insight and judgment were intact [de-identified] : Pain over lower back

## 2020-11-06 NOTE — ASSESSMENT
[FreeTextEntry1] : # Back pain\par - Previously referred by neurosurgery for possible SCS trial.\par - Pending insurance confirmation for coverage\par - Renewed steroid meds\par - Prescribed lidocaine patch\par - Gave 2.5mg bid marinol in light that it might help with pain\par - Warned patient about appetite stimulating effect and to watch her weight\par \par # Tubular adenoma\par - s/p coloscopy in March 2020\par - repeat coloscopy in 3-5 years\par \par #HCM\par - Last PAP in 2019\par - next mammo Dec 2020, ordered\par - routine labs reordered as patient lost her papers\par - received the flushot\par

## 2020-11-06 NOTE — REVIEW OF SYSTEMS
[Joint Pain] : joint pain [Back Pain] : back pain [Fever] : no fever [Chills] : no chills [Fatigue] : no fatigue [Night Sweats] : no night sweats [Discharge] : no discharge [Pain] : no pain [Vision Problems] : no vision problems [Earache] : no earache [Hearing Loss] : no hearing loss [Nasal Discharge] : no nasal discharge [Chest Pain] : no chest pain [Palpitations] : no palpitations [Leg Claudication] : no leg claudication [Shortness Of Breath] : no shortness of breath [Wheezing] : no wheezing [Cough] : no cough [Abdominal Pain] : no abdominal pain [Nausea] : no nausea [Constipation] : no constipation [Dysuria] : no dysuria [Incontinence] : no incontinence [Itching] : no itching [Skin Rash] : no skin rash [Headache] : no headache [Memory Loss] : no memory loss [Insomnia] : no insomnia [Easy Bruising] : no easy bruising

## 2020-11-07 DIAGNOSIS — M47.812 SPONDYLOSIS WITHOUT MYELOPATHY OR RADICULOPATHY, CERVICAL REGION: ICD-10-CM

## 2020-11-07 DIAGNOSIS — M54.9 DORSALGIA, UNSPECIFIED: ICD-10-CM

## 2020-11-07 DIAGNOSIS — Z00.00 ENCOUNTER FOR GENERAL ADULT MEDICAL EXAMINATION WITHOUT ABNORMAL FINDINGS: ICD-10-CM

## 2020-11-17 ENCOUNTER — NON-APPOINTMENT (OUTPATIENT)
Age: 50
End: 2020-11-17

## 2020-11-17 RX ORDER — DRONABINOL 2.5 MG/1
2.5 CAPSULE ORAL TWICE DAILY
Qty: 60 | Refills: 3 | Status: DISCONTINUED | COMMUNITY
Start: 2020-11-06 | End: 2020-11-17

## 2020-11-23 ENCOUNTER — NON-APPOINTMENT (OUTPATIENT)
Age: 50
End: 2020-11-23

## 2020-12-11 ENCOUNTER — NON-APPOINTMENT (OUTPATIENT)
Age: 50
End: 2020-12-11

## 2020-12-14 ENCOUNTER — NON-APPOINTMENT (OUTPATIENT)
Age: 50
End: 2020-12-14

## 2020-12-16 ENCOUNTER — APPOINTMENT (OUTPATIENT)
Dept: PAIN MANAGEMENT | Facility: CLINIC | Age: 50
End: 2020-12-16

## 2020-12-18 ENCOUNTER — OUTPATIENT (OUTPATIENT)
Dept: OUTPATIENT SERVICES | Facility: HOSPITAL | Age: 50
LOS: 1 days | Discharge: HOME | End: 2020-12-18

## 2020-12-18 ENCOUNTER — APPOINTMENT (OUTPATIENT)
Dept: INTERNAL MEDICINE | Facility: CLINIC | Age: 50
End: 2020-12-18
Payer: MEDICAID

## 2020-12-18 ENCOUNTER — NON-APPOINTMENT (OUTPATIENT)
Age: 50
End: 2020-12-18

## 2020-12-18 VITALS
WEIGHT: 147 LBS | DIASTOLIC BLOOD PRESSURE: 81 MMHG | HEART RATE: 61 BPM | BODY MASS INDEX: 29.64 KG/M2 | HEIGHT: 59 IN | SYSTOLIC BLOOD PRESSURE: 132 MMHG | TEMPERATURE: 98.4 F | OXYGEN SATURATION: 98 %

## 2020-12-18 DIAGNOSIS — Z98.890 OTHER SPECIFIED POSTPROCEDURAL STATES: Chronic | ICD-10-CM

## 2020-12-18 PROCEDURE — 99213 OFFICE O/P EST LOW 20 MIN: CPT | Mod: GC

## 2020-12-18 RX ORDER — LIDOCAINE 5% 700 MG/1
5 PATCH TOPICAL
Qty: 30 | Refills: 3 | Status: DISCONTINUED | COMMUNITY
Start: 1900-01-01 | End: 2020-12-18

## 2020-12-18 RX ORDER — CHOLECALCIFEROL (VITAMIN D3) 1250 MCG
1.25 MG CAPSULE ORAL
Qty: 10 | Refills: 0 | Status: DISCONTINUED | COMMUNITY
Start: 2020-09-18 | End: 2020-12-18

## 2020-12-18 RX ORDER — METHYLPREDNISOLONE 4 MG/1
4 TABLET ORAL
Qty: 12 | Refills: 0 | Status: DISCONTINUED | COMMUNITY
Start: 2020-09-18 | End: 2020-12-18

## 2020-12-18 NOTE — PHYSICAL EXAM
[Well Nourished] : well nourished [Well Developed] : well developed [Grossly Normal Strength/Tone] : grossly normal strength/tone [Coordination Grossly Intact] : coordination grossly intact [Normal] : affect was normal and insight and judgment were intact [de-identified] : Pt in acute distress due to neck pain

## 2020-12-18 NOTE — HISTORY OF PRESENT ILLNESS
[de-identified] : 50 year old female patient presenting for neck and back pain. \par \par Known with worsening lower back and neck pain and lumbar radicular pain since 2010 after a fall. Patient has tried and failed several rounds of injections before and after surgery, then ACDF in 2014 and lumbar fusion in 2018.\par She was on opioid medications, which she stopped 2 months ago. The patient finished a one time fill of oral methylprednisolone which gave her limited relief. The patient has had follow up neurosurgical evaluation that suggested SCS trial. Pt reports that she is scheduled for procedure on 01/13/2021.\par Pain levels include a current pain level of 9/10. Pt did not receive lidocaine patch ordered during last visit. \par Modifying factors - worsened by bending, worsened by lifting, worsened by prolonged sitting, worsened by prolonged standing, worsened by sitting, worsened by standing, worsened by walking and worsened by weight bearing. Relieving factors include not relieved by acetaminophen, not relieved by nonsteroidal anti-inflammatory drugs, not relieved by opioid analgesics and not relieved by physical therapy. \par Associated Symptoms: no ataxia and no incontinence.\par Pt has h/o reflux, relieved with omeprazole.

## 2020-12-18 NOTE — ASSESSMENT
[FreeTextEntry1] : # Back pain\par - Previously referred by neurosurgery for possible SCS trial. scheduled for procedure on 01/13/2021\par - Renewed steroid meds\par - Prescribed again lidocaine ointment\par - prescribed Tramadol for pain\par \par # Tubular adenoma\par - s/p coloscopy in March 2020\par - repeat coloscopy in 3-5 years\par \par # Anemia\par - Pt denies any dark stool, stomach pain\par - denies any bleeding site except occasional gum bleeding during brushing teeth\par - check iron, B12, folate levels\par - multivitamin supplements.\par - repeat blood work\par \par # Pre-diabetic\par - elevated HbA1c; stable\par - follow healthy diet and exercise for weight reduction\par \par # GERD\par - continue Omeprazole\par  \par #HCM\par - Last PAP in 2019\par - next mammo Dec 2020, ordered\par - routine labs reordered as patient lost her papers\par - meds renewed\par - received the flu shot on 11/06/2020\par - RTC after 1 month

## 2020-12-22 ENCOUNTER — NON-APPOINTMENT (OUTPATIENT)
Age: 50
End: 2020-12-22

## 2020-12-22 DIAGNOSIS — M54.9 DORSALGIA, UNSPECIFIED: ICD-10-CM

## 2020-12-22 DIAGNOSIS — K21.9 GASTRO-ESOPHAGEAL REFLUX DISEASE WITHOUT ESOPHAGITIS: ICD-10-CM

## 2020-12-22 DIAGNOSIS — M47.812 SPONDYLOSIS WITHOUT MYELOPATHY OR RADICULOPATHY, CERVICAL REGION: ICD-10-CM

## 2020-12-22 DIAGNOSIS — Z00.00 ENCOUNTER FOR GENERAL ADULT MEDICAL EXAMINATION WITHOUT ABNORMAL FINDINGS: ICD-10-CM

## 2020-12-29 LAB
ANION GAP SERPL CALC-SCNC: 11 MMOL/L
BASOPHILS # BLD AUTO: 0.02 K/UL
BASOPHILS NFR BLD AUTO: 0.3 %
BUN SERPL-MCNC: 10 MG/DL
CALCIUM SERPL-MCNC: 8.7 MG/DL
CHLORIDE SERPL-SCNC: 100 MMOL/L
CHOLEST SERPL-MCNC: 138 MG/DL
CO2 SERPL-SCNC: 22 MMOL/L
CREAT SERPL-MCNC: 0.5 MG/DL
EOSINOPHIL # BLD AUTO: 0.05 K/UL
EOSINOPHIL NFR BLD AUTO: 0.6 %
ESTIMATED AVERAGE GLUCOSE: 123 MG/DL
GLUCOSE SERPL-MCNC: 95 MG/DL
HBA1C MFR BLD HPLC: 5.9 %
HCT VFR BLD CALC: 36 %
HDLC SERPL-MCNC: 50 MG/DL
HGB BLD-MCNC: 11.6 G/DL
IMM GRANULOCYTES NFR BLD AUTO: 0.5 %
LDLC SERPL CALC-MCNC: 87 MG/DL
LYMPHOCYTES # BLD AUTO: 2.38 K/UL
LYMPHOCYTES NFR BLD AUTO: 30.7 %
MAN DIFF?: NORMAL
MCHC RBC-ENTMCNC: 28.9 PG
MCHC RBC-ENTMCNC: 32.2 G/DL
MCV RBC AUTO: 89.6 FL
MONOCYTES # BLD AUTO: 0.39 K/UL
MONOCYTES NFR BLD AUTO: 5 %
NEUTROPHILS # BLD AUTO: 4.86 K/UL
NEUTROPHILS NFR BLD AUTO: 62.9 %
NONHDLC SERPL-MCNC: 88 MG/DL
PLATELET # BLD AUTO: 165 K/UL
POTASSIUM SERPL-SCNC: 4.3 MMOL/L
RBC # BLD: 4.02 M/UL
RBC # FLD: 12.9 %
SODIUM SERPL-SCNC: 133 MMOL/L
TRIGL SERPL-MCNC: 100 MG/DL
TSH SERPL-ACNC: 1.06 UIU/ML
WBC # FLD AUTO: 7.74 K/UL

## 2021-01-10 ENCOUNTER — OUTPATIENT (OUTPATIENT)
Dept: OUTPATIENT SERVICES | Facility: HOSPITAL | Age: 51
LOS: 1 days | Discharge: HOME | End: 2021-01-10

## 2021-01-10 ENCOUNTER — LABORATORY RESULT (OUTPATIENT)
Age: 51
End: 2021-01-10

## 2021-01-10 DIAGNOSIS — Z11.59 ENCOUNTER FOR SCREENING FOR OTHER VIRAL DISEASES: ICD-10-CM

## 2021-01-10 DIAGNOSIS — Z98.890 OTHER SPECIFIED POSTPROCEDURAL STATES: Chronic | ICD-10-CM

## 2021-01-13 ENCOUNTER — NON-APPOINTMENT (OUTPATIENT)
Age: 51
End: 2021-01-13

## 2021-01-13 ENCOUNTER — OUTPATIENT (OUTPATIENT)
Dept: OUTPATIENT SERVICES | Facility: HOSPITAL | Age: 51
LOS: 1 days | Discharge: HOME | End: 2021-01-13

## 2021-01-13 VITALS
DIASTOLIC BLOOD PRESSURE: 81 MMHG | HEART RATE: 64 BPM | TEMPERATURE: 98 F | RESPIRATION RATE: 20 BRPM | WEIGHT: 151.02 LBS | OXYGEN SATURATION: 98 % | SYSTOLIC BLOOD PRESSURE: 142 MMHG

## 2021-01-13 VITALS — OXYGEN SATURATION: 97 % | HEART RATE: 62 BPM | RESPIRATION RATE: 16 BRPM

## 2021-01-13 DIAGNOSIS — Z98.890 OTHER SPECIFIED POSTPROCEDURAL STATES: Chronic | ICD-10-CM

## 2021-01-13 DIAGNOSIS — Z88.5 ALLERGY STATUS TO NARCOTIC AGENT: ICD-10-CM

## 2021-01-13 DIAGNOSIS — M96.1 POSTLAMINECTOMY SYNDROME, NOT ELSEWHERE CLASSIFIED: ICD-10-CM

## 2021-01-13 DIAGNOSIS — M54.16 RADICULOPATHY, LUMBAR REGION: ICD-10-CM

## 2021-01-13 DIAGNOSIS — J45.909 UNSPECIFIED ASTHMA, UNCOMPLICATED: ICD-10-CM

## 2021-01-13 DIAGNOSIS — K21.9 GASTRO-ESOPHAGEAL REFLUX DISEASE WITHOUT ESOPHAGITIS: ICD-10-CM

## 2021-01-13 RX ORDER — SODIUM CHLORIDE 9 MG/ML
1000 INJECTION, SOLUTION INTRAVENOUS
Refills: 0 | Status: DISCONTINUED | OUTPATIENT
Start: 2021-01-13 | End: 2021-01-13

## 2021-01-13 RX ORDER — HYDROMORPHONE HYDROCHLORIDE 2 MG/ML
0.5 INJECTION INTRAMUSCULAR; INTRAVENOUS; SUBCUTANEOUS
Refills: 0 | Status: DISCONTINUED | OUTPATIENT
Start: 2021-01-13 | End: 2021-01-13

## 2021-01-13 RX ORDER — ONDANSETRON 8 MG/1
4 TABLET, FILM COATED ORAL ONCE
Refills: 0 | Status: DISCONTINUED | OUTPATIENT
Start: 2021-01-13 | End: 2021-01-13

## 2021-01-13 RX ADMIN — HYDROMORPHONE HYDROCHLORIDE 0.5 MILLIGRAM(S): 2 INJECTION INTRAMUSCULAR; INTRAVENOUS; SUBCUTANEOUS at 13:09

## 2021-01-13 RX ADMIN — HYDROMORPHONE HYDROCHLORIDE 0.5 MILLIGRAM(S): 2 INJECTION INTRAMUSCULAR; INTRAVENOUS; SUBCUTANEOUS at 13:32

## 2021-01-13 NOTE — CHART NOTE - NSCHARTNOTEFT_GEN_A_CORE
PACU ANESTHESIA ADMISSION NOTE      ____  Intubated  TV:______       Rate: ______      FiO2: ______    __x__  Patent Airway    __x__  Full return of protective reflexes    __x__  Full recovery from anesthesia / back to baseline status    Vitals:  T(C): 36.4 (01-13-21 @ 11:29), Max: 36.4 (01-13-21 @ 10:56)  HR: 64 (01-13-21 @ 11:29) (64 - 64)  BP: 142/81 (01-13-21 @ 11:29) (142/81 - 142/81)  RR: 20 (01-13-21 @ 11:29) (20 - 20)  SpO2: 98% (01-13-21 @ 11:29) (98% - 98%)    Mental Status:  __x__ Awake   ___x__ Alert   _____ Drowsy   _____ Sedated    Nausea/Vomiting:  __x__ NO  ______Yes,   See Post - Op Orders          Pain Scale (0-10):  _2____    Treatment: ____ None    __x__ See Post - Op/PCA Orders    Post - Operative Fluids:   ____ Oral   __x__ See Post - Op Orders    Plan: Discharge:   _x___Home       _____Floor     _____Critical Care    _____  Other:_________________    Comments: Patient had smooth intraoperative event, no anesthesia complication.  PACU Vital signs: HR:  56          BP: 139       /   74       RR: 16            O2 Sat: 99      %     Temp 36

## 2021-01-13 NOTE — H&P PST ADULT - HISTORY OF PRESENT ILLNESS
51 y/o F with persistent history of lower back pain and lumbar radicular pain not improved with conservative, interventional and surgical treatment.  The patient presents today for thoracolumbar spinal cord stimulator trial.

## 2021-01-13 NOTE — H&P PST ADULT - NSICDXPASTSURGICALHX_GEN_ALL_CORE_FT
PAST SURGICAL HISTORY:  H/O neck surgery     History of back surgery     History of cholecystectomy

## 2021-01-13 NOTE — H&P PST ADULT - NSICDXPASTMEDICALHX_GEN_ALL_CORE_FT
PAST MEDICAL HISTORY:  Anxiety     Asthma, unspecified asthma severity, unspecified whether complicated, unspecified whether persistent     Chronic back pain, unspecified back location, unspecified back pain laterality     Depression, unspecified depression type     Gastroesophageal reflux disease, esophagitis presence not specified     History of cholecystectomy     PTSD (post-traumatic stress disorder)

## 2021-01-13 NOTE — ASU DISCHARGE PLAN (ADULT/PEDIATRIC) - CARE PROVIDER_API CALL
Butch Suazo  ANESTHESIOLOGY  [address] [facility] [place] [address]  Phone: [phone]  Fax: [phone]  Established Patient  Follow Up Time: 1 week

## 2021-01-20 ENCOUNTER — APPOINTMENT (OUTPATIENT)
Dept: NEUROSURGERY | Facility: CLINIC | Age: 51
End: 2021-01-20
Payer: MEDICAID

## 2021-01-20 PROCEDURE — 63661 REMOVE SPINE ELTRD PERQ ARAY: CPT

## 2021-01-20 PROCEDURE — 99214 OFFICE O/P EST MOD 30 MIN: CPT | Mod: 25

## 2021-01-22 RX ORDER — LIDOCAINE 5 G/100G
5 OINTMENT TOPICAL
Qty: 1 | Refills: 4 | Status: DISCONTINUED | COMMUNITY
Start: 2020-11-23 | End: 2021-01-22

## 2021-01-22 RX ORDER — TRAMADOL HYDROCHLORIDE 50 MG/1
50 TABLET, COATED ORAL TWICE DAILY
Qty: 60 | Refills: 2 | Status: DISCONTINUED | COMMUNITY
Start: 2020-12-18 | End: 2021-01-22

## 2021-01-25 NOTE — PLAN
[FreeTextEntry1] : After leads x2 removed without incident we discussed the risk, benefits and alternatives (include revision of fusion) with Ms. Guevara including but not limited to bleeding, infection, CSF leak, nerve damage , spinal cord injury, lead migration, hardware failure, pocket pain, lack of pain relief, need for further procedures. She would like to proceed to percutaneous implant of the [employer]tronic SCS system.

## 2021-01-25 NOTE — HISTORY OF PRESENT ILLNESS
[de-identified] : I am seeing Ms. Guevara at the end of her medtronic thoracic SCS trial. She is s/p lumbar TLIF several years ago by Dr. Strange for back and leg pain with no relief of back and RLE more than LLE leg pain. 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 scar tissue at L4-5 . She has failed further conservative measures. She underwent trial of SCS through Dr. CRUZ at the [facility]. This is day 7 of her trial and she has had over 65% relief in both back and leg pain. She reports improved ambulation, stamina and sleep. She would like to proceed with implantation. Leads were pulled without incidence in the office after retention suture removed.

## 2021-01-28 ENCOUNTER — OUTPATIENT (OUTPATIENT)
Dept: OUTPATIENT SERVICES | Facility: HOSPITAL | Age: 51
LOS: 1 days | Discharge: HOME | End: 2021-01-28
Payer: MEDICAID

## 2021-01-28 ENCOUNTER — RESULT REVIEW (OUTPATIENT)
Age: 51
End: 2021-01-28

## 2021-01-28 VITALS
HEART RATE: 62 BPM | RESPIRATION RATE: 18 BRPM | DIASTOLIC BLOOD PRESSURE: 78 MMHG | WEIGHT: 139.33 LBS | HEIGHT: 60 IN | SYSTOLIC BLOOD PRESSURE: 142 MMHG | OXYGEN SATURATION: 96 % | TEMPERATURE: 97 F

## 2021-01-28 DIAGNOSIS — Z01.818 ENCOUNTER FOR OTHER PREPROCEDURAL EXAMINATION: ICD-10-CM

## 2021-01-28 DIAGNOSIS — G89.4 CHRONIC PAIN SYNDROME: ICD-10-CM

## 2021-01-28 DIAGNOSIS — Z98.890 OTHER SPECIFIED POSTPROCEDURAL STATES: Chronic | ICD-10-CM

## 2021-01-28 LAB
A1C WITH ESTIMATED AVERAGE GLUCOSE RESULT: 5.8 % — HIGH (ref 4–5.6)
ALBUMIN SERPL ELPH-MCNC: 4.6 G/DL — SIGNIFICANT CHANGE UP (ref 3.5–5.2)
ALP SERPL-CCNC: 115 U/L — SIGNIFICANT CHANGE UP (ref 30–115)
ALT FLD-CCNC: 43 U/L — HIGH (ref 0–41)
ANION GAP SERPL CALC-SCNC: 11 MMOL/L — SIGNIFICANT CHANGE UP (ref 7–14)
APPEARANCE UR: CLEAR — SIGNIFICANT CHANGE UP
APTT BLD: 35.4 SEC — SIGNIFICANT CHANGE UP (ref 27–39.2)
AST SERPL-CCNC: 23 U/L — SIGNIFICANT CHANGE UP (ref 0–41)
BACTERIA # UR AUTO: NEGATIVE — SIGNIFICANT CHANGE UP
BASOPHILS # BLD AUTO: 0.03 K/UL — SIGNIFICANT CHANGE UP (ref 0–0.2)
BASOPHILS NFR BLD AUTO: 0.6 % — SIGNIFICANT CHANGE UP (ref 0–1)
BILIRUB SERPL-MCNC: 0.3 MG/DL — SIGNIFICANT CHANGE UP (ref 0.2–1.2)
BILIRUB UR-MCNC: NEGATIVE — SIGNIFICANT CHANGE UP
BLD GP AB SCN SERPL QL: SIGNIFICANT CHANGE UP
BUN SERPL-MCNC: 14 MG/DL — SIGNIFICANT CHANGE UP (ref 10–20)
CALCIUM SERPL-MCNC: 9.5 MG/DL — SIGNIFICANT CHANGE UP (ref 8.5–10.1)
CHLORIDE SERPL-SCNC: 104 MMOL/L — SIGNIFICANT CHANGE UP (ref 98–110)
CO2 SERPL-SCNC: 22 MMOL/L — SIGNIFICANT CHANGE UP (ref 17–32)
COLOR SPEC: SIGNIFICANT CHANGE UP
CREAT SERPL-MCNC: 0.5 MG/DL — LOW (ref 0.7–1.5)
DIFF PNL FLD: ABNORMAL
EOSINOPHIL # BLD AUTO: 0.07 K/UL — SIGNIFICANT CHANGE UP (ref 0–0.7)
EOSINOPHIL NFR BLD AUTO: 1.4 % — SIGNIFICANT CHANGE UP (ref 0–8)
EPI CELLS # UR: 1 /HPF — SIGNIFICANT CHANGE UP (ref 0–5)
ESTIMATED AVERAGE GLUCOSE: 120 MG/DL — HIGH (ref 68–114)
GLUCOSE SERPL-MCNC: 96 MG/DL — SIGNIFICANT CHANGE UP (ref 70–99)
GLUCOSE UR QL: NEGATIVE — SIGNIFICANT CHANGE UP
HCT VFR BLD CALC: 39.4 % — SIGNIFICANT CHANGE UP (ref 37–47)
HGB BLD-MCNC: 13.2 G/DL — SIGNIFICANT CHANGE UP (ref 12–16)
HYALINE CASTS # UR AUTO: 0 /LPF — SIGNIFICANT CHANGE UP (ref 0–7)
IMM GRANULOCYTES NFR BLD AUTO: 0.2 % — SIGNIFICANT CHANGE UP (ref 0.1–0.3)
INR BLD: 1.03 RATIO — SIGNIFICANT CHANGE UP (ref 0.65–1.3)
KETONES UR-MCNC: NEGATIVE — SIGNIFICANT CHANGE UP
LEUKOCYTE ESTERASE UR-ACNC: NEGATIVE — SIGNIFICANT CHANGE UP
LYMPHOCYTES # BLD AUTO: 2.09 K/UL — SIGNIFICANT CHANGE UP (ref 1.2–3.4)
LYMPHOCYTES # BLD AUTO: 42 % — SIGNIFICANT CHANGE UP (ref 20.5–51.1)
MCHC RBC-ENTMCNC: 29.5 PG — SIGNIFICANT CHANGE UP (ref 27–31)
MCHC RBC-ENTMCNC: 33.5 G/DL — SIGNIFICANT CHANGE UP (ref 32–37)
MCV RBC AUTO: 87.9 FL — SIGNIFICANT CHANGE UP (ref 81–99)
MONOCYTES # BLD AUTO: 0.29 K/UL — SIGNIFICANT CHANGE UP (ref 0.1–0.6)
MONOCYTES NFR BLD AUTO: 5.8 % — SIGNIFICANT CHANGE UP (ref 1.7–9.3)
NEUTROPHILS # BLD AUTO: 2.49 K/UL — SIGNIFICANT CHANGE UP (ref 1.4–6.5)
NEUTROPHILS NFR BLD AUTO: 50 % — SIGNIFICANT CHANGE UP (ref 42.2–75.2)
NITRITE UR-MCNC: NEGATIVE — SIGNIFICANT CHANGE UP
NRBC # BLD: 0 /100 WBCS — SIGNIFICANT CHANGE UP (ref 0–0)
PH UR: 6 — SIGNIFICANT CHANGE UP (ref 5–8)
PLATELET # BLD AUTO: 186 K/UL — SIGNIFICANT CHANGE UP (ref 130–400)
POTASSIUM SERPL-MCNC: 4.4 MMOL/L — SIGNIFICANT CHANGE UP (ref 3.5–5)
POTASSIUM SERPL-SCNC: 4.4 MMOL/L — SIGNIFICANT CHANGE UP (ref 3.5–5)
PROT SERPL-MCNC: 7.5 G/DL — SIGNIFICANT CHANGE UP (ref 6–8)
PROT UR-MCNC: NEGATIVE — SIGNIFICANT CHANGE UP
PROTHROM AB SERPL-ACNC: 11.8 SEC — SIGNIFICANT CHANGE UP (ref 9.95–12.87)
RBC # BLD: 4.48 M/UL — SIGNIFICANT CHANGE UP (ref 4.2–5.4)
RBC # FLD: 12.3 % — SIGNIFICANT CHANGE UP (ref 11.5–14.5)
RBC CASTS # UR COMP ASSIST: 1 /HPF — SIGNIFICANT CHANGE UP (ref 0–4)
SODIUM SERPL-SCNC: 137 MMOL/L — SIGNIFICANT CHANGE UP (ref 135–146)
SP GR SPEC: 1.01 — SIGNIFICANT CHANGE UP (ref 1.01–1.03)
UROBILINOGEN FLD QL: SIGNIFICANT CHANGE UP
WBC # BLD: 4.98 K/UL — SIGNIFICANT CHANGE UP (ref 4.8–10.8)
WBC # FLD AUTO: 4.98 K/UL — SIGNIFICANT CHANGE UP (ref 4.8–10.8)
WBC UR QL: 1 /HPF — SIGNIFICANT CHANGE UP (ref 0–5)

## 2021-01-28 PROCEDURE — 93010 ELECTROCARDIOGRAM REPORT: CPT

## 2021-01-28 PROCEDURE — 71046 X-RAY EXAM CHEST 2 VIEWS: CPT | Mod: 26

## 2021-01-28 RX ORDER — DOCUSATE SODIUM 100 MG
1 CAPSULE ORAL
Qty: 0 | Refills: 0 | DISCHARGE

## 2021-01-28 RX ORDER — ESCITALOPRAM OXALATE 10 MG/1
1 TABLET, FILM COATED ORAL
Qty: 0 | Refills: 0 | DISCHARGE

## 2021-01-28 RX ORDER — GABAPENTIN 400 MG/1
1 CAPSULE ORAL
Qty: 0 | Refills: 0 | DISCHARGE

## 2021-01-28 RX ORDER — SENNA PLUS 8.6 MG/1
1 TABLET ORAL
Qty: 0 | Refills: 0 | DISCHARGE

## 2021-01-28 RX ORDER — RANITIDINE HYDROCHLORIDE 150 MG/1
1 TABLET, FILM COATED ORAL
Qty: 0 | Refills: 0 | DISCHARGE

## 2021-01-28 RX ORDER — TRAZODONE HCL 50 MG
0 TABLET ORAL
Qty: 0 | Refills: 0 | DISCHARGE

## 2021-01-28 RX ORDER — CYCLOBENZAPRINE HYDROCHLORIDE 10 MG/1
1 TABLET, FILM COATED ORAL
Qty: 0 | Refills: 0 | DISCHARGE

## 2021-01-28 NOTE — H&P PST ADULT - REASON FOR ADMISSION
Marta Guevara is a 52 yo f presents to PAST for percutaneous placement of spinal cord stimulator under GA by Dr. Pichardo on 02/18/2021 at [facility].   Covid testing on 02/8/2021 10:30am
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
## 2021-01-28 NOTE — H&P PST ADULT - NSICDXPASTSURGICALHX_GEN_ALL_CORE_FT
Pt A&O x4, bedrest, VSS, afebrile, RA. Denies n/v/d. Pt CO indigestion, prn Maalox given x1 w/ good relief. Pt CO 2/10 pain, relieved with scheduled Oxy.   All medications given as scheduled. R PIV flushes well, saline locked. Loja in place, good urine output. UA sent to lab.   Ortho boot/ bunny boot rotated q4h. Will CTM & notify MD of any changes.    PAST SURGICAL HISTORY:  H/O neck surgery     History of back surgery     History of cholecystectomy

## 2021-02-08 ENCOUNTER — OUTPATIENT (OUTPATIENT)
Dept: OUTPATIENT SERVICES | Facility: HOSPITAL | Age: 51
LOS: 1 days | Discharge: HOME | End: 2021-02-08

## 2021-02-08 ENCOUNTER — LABORATORY RESULT (OUTPATIENT)
Age: 51
End: 2021-02-08

## 2021-02-08 DIAGNOSIS — Z11.59 ENCOUNTER FOR SCREENING FOR OTHER VIRAL DISEASES: ICD-10-CM

## 2021-02-08 DIAGNOSIS — Z98.890 OTHER SPECIFIED POSTPROCEDURAL STATES: Chronic | ICD-10-CM

## 2021-02-08 PROBLEM — M54.2 CERVICALGIA: Chronic | Status: ACTIVE | Noted: 2021-01-28
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
## 2021-02-09 ENCOUNTER — NON-APPOINTMENT (OUTPATIENT)
Age: 51
End: 2021-02-09

## 2021-02-09 NOTE — PROCEDURE NOTE - ADDITIONAL PROCEDURE DETAILS
Spinal Cord Stimulator Trial    DIAGNOSIS: Post-Laminectomy Syndrome  POST-PROCEDURE DIAGNOSIS: Same as above    PROCEDURE:  1) Insertion of spinal cord stimulator leads x 2  2) Fluoroscopic needle guidance  3) Initial programming of device    PHYSICIAN: Butch Suazo MD  ASSISTANT: None  LOCAL ANESTHETIC INJECTED: 3 mL of 1% lidocaine  SEDATION MEDICATIONS: MAC  ESTIMATED BLOOD LOSS: None  COMPLICATIONS: None    TECHNIQUE:  Time-out was taken to identify the correct patient, procedure and side prior to starting the procedure. With the patient lying in the prone position on the fluoroscopy table, the area was prepped and draped in the usual sterile fashion using Duraprep and a fenestrated drape.  Antibiotics were administered by the anesthesiologist.  Routine vital sign monitors were applied and the anesthesia was initiated.  The patient remained conversant throughout the procedure.        The area to be injected was determined using fluoroscopy.  Local anesthetic was given via 25G 3.5 inch needle.  A 14G Tuohy needle was then advanced to the right L1 Lamina.  It was walked off in a superior medial direction until it entered the T12/L1 epidural space using loss of resistance.  The Medtronic spinal cord stimulator lead was advanced through the Tuohy needle and directed midline to rest the tip of the lead at the top T8 vertebral body.  The same procedure was repeated in detail for the left side to insert a second lead within the epidural space to reside adjacent to the first lead.  AP and lateral images were preformed to insure the placement of the leads.  Once the leads were assured to be in the correct position, the needles were withdrawn leaving the leads in place.  Leads were secured to the patient's skin via silk sutures, Steristrips and Tegaderm.  The patient's back was cleaned.  The patient was allowed to fully recover from anesthesia and taken to the recovery room in good position.      The procedure was completed without complication and was tolerated well.  The patient was monitored after the procedure.  Final stimulation programming and testing the device was done in the recovery room by the device representative.  The patient was given post-procedure and discharge instructions.  The patient was discharged home in stable condition.

## 2021-02-11 ENCOUNTER — OUTPATIENT (OUTPATIENT)
Dept: OUTPATIENT SERVICES | Facility: HOSPITAL | Age: 51
LOS: 1 days | Discharge: HOME | End: 2021-02-11
Payer: MEDICAID

## 2021-02-11 ENCOUNTER — APPOINTMENT (OUTPATIENT)
Dept: NEUROSURGERY | Facility: HOSPITAL | Age: 51
End: 2021-02-11
Payer: MEDICAID

## 2021-02-11 VITALS
HEIGHT: 60 IN | SYSTOLIC BLOOD PRESSURE: 105 MMHG | TEMPERATURE: 98 F | RESPIRATION RATE: 15 BRPM | OXYGEN SATURATION: 100 % | DIASTOLIC BLOOD PRESSURE: 61 MMHG | HEART RATE: 58 BPM | WEIGHT: 151.9 LBS

## 2021-02-11 VITALS
SYSTOLIC BLOOD PRESSURE: 118 MMHG | OXYGEN SATURATION: 95 % | RESPIRATION RATE: 12 BRPM | HEART RATE: 68 BPM | DIASTOLIC BLOOD PRESSURE: 63 MMHG

## 2021-02-11 DIAGNOSIS — Z98.890 OTHER SPECIFIED POSTPROCEDURAL STATES: Chronic | ICD-10-CM

## 2021-02-11 PROCEDURE — 95972 ALYS CPLX SP/PN NPGT W/PRGRM: CPT | Mod: AS,59

## 2021-02-11 PROCEDURE — 63650 IMPLANT NEUROELECTRODES: CPT

## 2021-02-11 PROCEDURE — 95972 ALYS CPLX SP/PN NPGT W/PRGRM: CPT | Mod: NC,AS,59

## 2021-02-11 PROCEDURE — 63685 INS/RPLC SPI NPG/RCVR POCKET: CPT | Mod: AS,LT

## 2021-02-11 PROCEDURE — 63685 INS/RPLC SPI NPG/RCVR POCKET: CPT | Mod: NC,AS,LT

## 2021-02-11 RX ORDER — HYDROMORPHONE HYDROCHLORIDE 2 MG/ML
1 INJECTION INTRAMUSCULAR; INTRAVENOUS; SUBCUTANEOUS
Refills: 0 | Status: DISCONTINUED | OUTPATIENT
Start: 2021-02-11 | End: 2021-02-11

## 2021-02-11 RX ORDER — ONDANSETRON 8 MG/1
4 TABLET, FILM COATED ORAL ONCE
Refills: 0 | Status: DISCONTINUED | OUTPATIENT
Start: 2021-02-11 | End: 2021-02-11

## 2021-02-11 RX ORDER — MORPHINE SULFATE 50 MG/1
4 CAPSULE, EXTENDED RELEASE ORAL
Refills: 0 | Status: DISCONTINUED | OUTPATIENT
Start: 2021-02-11 | End: 2021-02-11

## 2021-02-11 RX ORDER — SODIUM CHLORIDE 9 MG/ML
1000 INJECTION, SOLUTION INTRAVENOUS
Refills: 0 | Status: DISCONTINUED | OUTPATIENT
Start: 2021-02-11 | End: 2021-02-11

## 2021-02-11 RX ORDER — IBUPROFEN 200 MG
1 TABLET ORAL
Qty: 0 | Refills: 0 | DISCHARGE

## 2021-02-11 RX ORDER — HYDROMORPHONE HYDROCHLORIDE 2 MG/ML
0.5 INJECTION INTRAMUSCULAR; INTRAVENOUS; SUBCUTANEOUS
Refills: 0 | Status: DISCONTINUED | OUTPATIENT
Start: 2021-02-11 | End: 2021-02-11

## 2021-02-11 RX ADMIN — SODIUM CHLORIDE 125 MILLILITER(S): 9 INJECTION, SOLUTION INTRAVENOUS at 10:41

## 2021-02-11 RX ADMIN — HYDROMORPHONE HYDROCHLORIDE 1 MILLIGRAM(S): 2 INJECTION INTRAMUSCULAR; INTRAVENOUS; SUBCUTANEOUS at 11:25

## 2021-02-11 RX ADMIN — MORPHINE SULFATE 4 MILLIGRAM(S): 50 CAPSULE, EXTENDED RELEASE ORAL at 11:55

## 2021-02-11 RX ADMIN — HYDROMORPHONE HYDROCHLORIDE 1 MILLIGRAM(S): 2 INJECTION INTRAMUSCULAR; INTRAVENOUS; SUBCUTANEOUS at 10:45

## 2021-02-11 RX ADMIN — HYDROMORPHONE HYDROCHLORIDE 1 MILLIGRAM(S): 2 INJECTION INTRAMUSCULAR; INTRAVENOUS; SUBCUTANEOUS at 10:59

## 2021-02-11 NOTE — ASU PATIENT PROFILE, ADULT - PRO MENTAL HEALTH SX RECENT
- none
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
## 2021-02-11 NOTE — CHART NOTE - NSCHARTNOTEFT_GEN_A_CORE
PACU ANESTHESIA ADMISSION NOTE      Procedure: Trial, spinal cord stimulator      Post op diagnosis:  Post laminectomy syndrome        ____  Intubated  TV:______       Rate: ______      FiO2: ______    __x__  Patent Airway    __x__  Full return of protective reflexes    __x__  Full recovery from anesthesia / back to baseline status    Vitals:  T(C): 36.7 (02-11-21 @ 07:19), Max: 36.7 (02-11-21 @ 06:00)  HR: 58 (02-11-21 @ 07:19) (58 - 58)  BP: 105/61 (02-11-21 @ 07:19) (105/61 - 105/61)  RR: 15 (02-11-21 @ 07:19) (15 - 15)  SpO2: 100% (02-11-21 @ 07:19) (100% - 100%)    Mental Status:  __x__ Awake   ___x__ Alert   _____ Drowsy   _____ Sedated    Nausea/Vomiting:  __x__ NO  ______Yes,   See Post - Op Orders          Pain Scale (0-10):  _____    Treatment: ____ None    __x__ See Post - Op/PCA Orders    Post - Operative Fluids:   ____ Oral   __x__ See Post - Op Orders    Plan: Discharge:   _X___Home       _____Floor     _____Critical Care    _____  Other:_________________    Comments: Patient had smooth intraoperative event, no anesthesia complication.  PACU Vital signs: HR:     77       BP:    110    /59          RR:  18           O2 Sat:   98   %     Temp 97.9f

## 2021-02-11 NOTE — ASU DISCHARGE PLAN (ADULT/PEDIATRIC) - CARE PROVIDER_API CALL
Queenie Wilson)  [facility]  [address] [place] [address], [address]  [place] [address]  Phone: [phone]  Fax: [phone]  Follow Up Time:

## 2021-02-17 ENCOUNTER — APPOINTMENT (OUTPATIENT)
Dept: PAIN MANAGEMENT | Facility: CLINIC | Age: 51
End: 2021-02-17

## 2021-02-17 ENCOUNTER — NON-APPOINTMENT (OUTPATIENT)
Age: 51
End: 2021-02-17

## 2021-02-17 ENCOUNTER — APPOINTMENT (OUTPATIENT)
Dept: NEUROSURGERY | Facility: CLINIC | Age: 51
End: 2021-02-17
Payer: MEDICAID

## 2021-02-17 VITALS
DIASTOLIC BLOOD PRESSURE: 69 MMHG | OXYGEN SATURATION: 97 % | TEMPERATURE: 98.4 F | SYSTOLIC BLOOD PRESSURE: 104 MMHG | RESPIRATION RATE: 17 BRPM | WEIGHT: 152 LBS | HEART RATE: 89 BPM | BODY MASS INDEX: 29.84 KG/M2 | HEIGHT: 60 IN

## 2021-02-17 DIAGNOSIS — G89.29 OTHER CHRONIC PAIN: ICD-10-CM

## 2021-02-17 DIAGNOSIS — J45.909 UNSPECIFIED ASTHMA, UNCOMPLICATED: ICD-10-CM

## 2021-02-17 DIAGNOSIS — M54.9 DORSALGIA, UNSPECIFIED: ICD-10-CM

## 2021-02-17 DIAGNOSIS — M54.16 RADICULOPATHY, LUMBAR REGION: ICD-10-CM

## 2021-02-17 DIAGNOSIS — F32.9 MAJOR DEPRESSIVE DISORDER, SINGLE EPISODE, UNSPECIFIED: ICD-10-CM

## 2021-02-17 DIAGNOSIS — F41.9 ANXIETY DISORDER, UNSPECIFIED: ICD-10-CM

## 2021-02-17 DIAGNOSIS — K21.9 GASTRO-ESOPHAGEAL REFLUX DISEASE WITHOUT ESOPHAGITIS: ICD-10-CM

## 2021-02-17 DIAGNOSIS — F43.10 POST-TRAUMATIC STRESS DISORDER, UNSPECIFIED: ICD-10-CM

## 2021-02-17 DIAGNOSIS — M96.1 POSTLAMINECTOMY SYNDROME, NOT ELSEWHERE CLASSIFIED: ICD-10-CM

## 2021-02-17 PROCEDURE — 99024 POSTOP FOLLOW-UP VISIT: CPT

## 2021-02-17 NOTE — ASSESSMENT
[FreeTextEntry1] : 1. Chronic Low back pain with radiculopathy:\par - Patient is s/p SCS implant on 2/11 with 100 percent pain relief.\par - Complaining of some constitutional symptoms, subjective fever, has not taken temp at home, site is clean, dressings c/d/i, no sign of infection. \par - Pt has follow up with Dr. Wilson for 3/1, will likely see her today as there was confusion by the patient about possibly needing other medications \par - Denies other symptoms, otherwise doing well, no fever in the office today\par - Vitals all WNL, she was made aware that if she is having a fever to contact Pain Management, Neurosurgery, or her PCP or she would be advised to see urgent care/ER\par - Patient will follow up in 2 months\par \par \par

## 2021-02-17 NOTE — REASON FOR VISIT
[Follow-Up Visit] : a follow-up pain management visit [FreeTextEntry2] : Back pain with radicular symptoms

## 2021-02-17 NOTE — HISTORY OF PRESENT ILLNESS
[Back Pain] : back pain [___ yrs] : [unfilled] year(s) ago [Constant] : constant [Throbbing] : throbbing [Shooting] : shooting [0] : a current pain level of 0/10 [Other: ___] : [unfilled] [FreeTextEntry1] : Pt had a trial of Medtronic SCS with 65% pain relief. The implant was on 2/11, she says she has 100 percent pain relief, she has no pain at this time in her lower back. She said has been prescribed antibiotics but she says she has not obtained them at this time. She says she was prescribed abx for her trial, but not for this procedure. I explained she does not necessarily need abx for implant. She says she has felt, at times, like she has had a fever, but did not take her temperature, has been subjective fevers at this time. Today she does not have a fever in office. Dressings c/d/i.\par  [FreeTextEntry7] : Low back, bilateral legs [FreeTextEntry4] : SCS implant, 100 percent lower back and lower extremity pain relief.

## 2021-02-17 NOTE — PHYSICAL EXAM
[Normal muscle bulk without asymmetry] : normal muscle bulk without asymmetry [Walker] : ambulates with walker [UE Motor Strength NL] : Motor strength of the bilateral upper extremities is normal [LE Motor Strength NL] : Motor strength of the bilateral lower extremities was normal [Normal] : Peripheral pulses intact, no edema in b/I LE, no cyanosis [Spinous Process Tenderness] : no spinous process tenderness [Facet Tenderness] : no facet tenderness [] : Sensory: [Paraspinal Tenderness] : no paraspinal tenderness [L1-Bilat] : L1 [L2-Bilat] : L2 [L3-Bilat] : L3 [L4-Bilat] : L4 [L5-Bilat] : L5 [de-identified] : C [de-identified] : Skin examined where implant performed, non-tender, no redness/swelling. No sign of infection. Dressings c/d/i.

## 2021-02-19 ENCOUNTER — LABORATORY RESULT (OUTPATIENT)
Age: 51
End: 2021-02-19

## 2021-02-22 ENCOUNTER — OUTPATIENT (OUTPATIENT)
Dept: OUTPATIENT SERVICES | Facility: HOSPITAL | Age: 51
LOS: 1 days | Discharge: HOME | End: 2021-02-22

## 2021-02-22 DIAGNOSIS — Z98.890 OTHER SPECIFIED POSTPROCEDURAL STATES: Chronic | ICD-10-CM

## 2021-02-22 DIAGNOSIS — Z11.59 ENCOUNTER FOR SCREENING FOR OTHER VIRAL DISEASES: ICD-10-CM

## 2021-02-23 NOTE — HISTORY OF PRESENT ILLNESS
[FreeTextEntry1] : I am seeing Ms. Guevara s/p perc SCS implant. She has failed ? fever at home but did not take temperature. In pain management follow up today she was afebrila and looks well. she has minimal back and radicular pain as compared to pre op. INC are c/d/i no concerns for infection.

## 2021-02-24 LAB
BASOPHILS # BLD AUTO: 0 K/UL
BASOPHILS NFR BLD AUTO: 0 %
EOSINOPHIL # BLD AUTO: 0 K/UL
EOSINOPHIL NFR BLD AUTO: 0 %
FOLATE SERPL-MCNC: 13.1 NG/ML
HCT VFR BLD CALC: 37.7 %
HGB BLD-MCNC: 12 G/DL
IMM GRANULOCYTES NFR BLD AUTO: 0.3 %
IRON SATN MFR SERPL: 8 %
IRON SERPL-MCNC: 23 UG/DL
LYMPHOCYTES # BLD AUTO: 1.31 K/UL
LYMPHOCYTES NFR BLD AUTO: 33.5 %
MAN DIFF?: NORMAL
MCHC RBC-ENTMCNC: 29.1 PG
MCHC RBC-ENTMCNC: 31.8 G/DL
MCV RBC AUTO: 91.5 FL
MONOCYTES # BLD AUTO: 0.31 K/UL
MONOCYTES NFR BLD AUTO: 7.9 %
NEUTROPHILS # BLD AUTO: 2.28 K/UL
NEUTROPHILS NFR BLD AUTO: 58.3 %
PLATELET # BLD AUTO: 92 K/UL
RBC # BLD: 4.12 M/UL
RBC # FLD: 13 %
SARS-COV-2 N GENE NPH QL NAA+PROBE: DETECTED
TIBC SERPL-MCNC: 277 UG/DL
UIBC SERPL-MCNC: 254 UG/DL
VIT B12 SERPL-MCNC: 676 PG/ML
WBC # FLD AUTO: 3.91 K/UL

## 2021-03-01 ENCOUNTER — APPOINTMENT (OUTPATIENT)
Dept: NEUROSURGERY | Facility: CLINIC | Age: 51
End: 2021-03-01
Payer: MEDICAID

## 2021-03-01 PROCEDURE — 99213 OFFICE O/P EST LOW 20 MIN: CPT

## 2021-03-02 ENCOUNTER — NON-APPOINTMENT (OUTPATIENT)
Age: 51
End: 2021-03-02

## 2021-03-03 NOTE — ASSESSMENT
[FreeTextEntry1] : Marta was instructed to go to the hospital for any concerning worsening of symptoms. To stay hydrated and contact her PMD and remain in her quarantine. I will see her back post quarantine. She will call for any concerns.

## 2021-03-03 NOTE — HISTORY OF PRESENT ILLNESS
[Home] : at home, [unfilled] , at the time of the visit. [Medical Office: ([facility])___] : at the medical office located in  [Verbal consent obtained from patient] : the patient, [unfilled] [FreeTextEntry1] : Ms. Guevara has subsequently tested positive for COVID since her last visit. She denies SOB and endorses overall fatigue and diffuse myalgias so is unable to determine any pain relief from the stimulator.

## 2021-03-16 ENCOUNTER — NON-APPOINTMENT (OUTPATIENT)
Age: 51
End: 2021-03-16

## 2021-03-19 ENCOUNTER — NON-APPOINTMENT (OUTPATIENT)
Age: 51
End: 2021-03-19

## 2021-03-29 ENCOUNTER — APPOINTMENT (OUTPATIENT)
Dept: NEUROSURGERY | Facility: CLINIC | Age: 51
End: 2021-03-29
Payer: MEDICAID

## 2021-03-29 VITALS — HEIGHT: 60 IN | BODY MASS INDEX: 28.86 KG/M2 | WEIGHT: 147 LBS

## 2021-03-29 VITALS — BODY MASS INDEX: 28.86 KG/M2 | WEIGHT: 147 LBS | HEIGHT: 60 IN

## 2021-03-29 PROCEDURE — 99213 OFFICE O/P EST LOW 20 MIN: CPT

## 2021-03-29 NOTE — ASSESSMENT
[FreeTextEntry1] : Ms. Guevara will return to the office on Friday to meet with the [employer] SCS rep for reprogramming.

## 2021-03-29 NOTE — HISTORY OF PRESENT ILLNESS
[FreeTextEntry1] : Ms. Guevara is s/p percutaneous placement of a SCS. Surgery date: 2/11/2021. Her incision sites are healing well with no signs of erythema, infection, or discharge. Although the stimulator is working well she does not feel its giving her adequate pain coverage. In early March she was found to be COVID + therefore, her SCS has not be reprogrammed yet.

## 2021-03-29 NOTE — PHYSICAL EXAM
[FreeTextEntry1] : Alert / Oriented \par NAD \par Gait has improved since placement of SCS. + Cane. \par Decreased ROM lumbar spine \par Incisions: as stated above\par Motor intact \par

## 2021-04-02 ENCOUNTER — APPOINTMENT (OUTPATIENT)
Dept: NEUROSURGERY | Facility: CLINIC | Age: 51
End: 2021-04-02
Payer: MEDICAID

## 2021-04-02 PROCEDURE — 99212 OFFICE O/P EST SF 10 MIN: CPT

## 2021-04-02 NOTE — HISTORY OF PRESENT ILLNESS
[FreeTextEntry1] : Ms. Guevara, s/p percutaneous SCS placement on 2/11, presents today to be programmed by [employer]. Reports of chronic low back pain radiating to b/l legs,worse on the right with cramping. [employer] rep is here to assist with programming. Surgical site is healed.

## 2021-04-09 ENCOUNTER — APPOINTMENT (OUTPATIENT)
Dept: NEUROSURGERY | Facility: CLINIC | Age: 51
End: 2021-04-09
Payer: MEDICAID

## 2021-04-09 ENCOUNTER — APPOINTMENT (OUTPATIENT)
Dept: NEUROSURGERY | Facility: CLINIC | Age: 51
End: 2021-04-09

## 2021-04-09 PROCEDURE — 99441: CPT

## 2021-04-09 NOTE — HISTORY OF PRESENT ILLNESS
[FreeTextEntry1] : Ms. Guevara, s/p [employer]tronic SCS placed in Feb 2020 for intractable low back into bilateral legs, was programmed last week by Gillian. Patient reports of bilateral leg pain the same as friday but different pain from prior to surgery, associated with numbness, tingling, and muscle cramps. She does not have a pain management physician currently. \par Appears patient does not know how to change the program or amplitude.

## 2021-04-09 NOTE — REASON FOR VISIT
[Home] : at home, [unfilled] , at the time of the visit. [Medical Office: ([facility]– [place]___] : at the medical office located in  [Verbal consent obtained from patient] : the patient, [unfilled]

## 2021-04-21 ENCOUNTER — APPOINTMENT (OUTPATIENT)
Dept: PAIN MANAGEMENT | Facility: CLINIC | Age: 51
End: 2021-04-21

## 2021-04-21 ENCOUNTER — OUTPATIENT (OUTPATIENT)
Dept: OUTPATIENT SERVICES | Facility: HOSPITAL | Age: 51
LOS: 1 days | Discharge: HOME | End: 2021-04-21

## 2021-04-21 DIAGNOSIS — Z98.890 OTHER SPECIFIED POSTPROCEDURAL STATES: Chronic | ICD-10-CM

## 2021-04-21 NOTE — REASON FOR VISIT
[Follow-Up Visit] : a follow-up pain management visit [FreeTextEntry2] : presenting after SCS implant.

## 2021-04-21 NOTE — PHYSICAL EXAM
[Normal muscle bulk without asymmetry] : normal muscle bulk without asymmetry [Cane] : ambulates with cane [] : Motor: [LE Motor Strength NL] : Motor strength of the bilateral lower extremities was normal [Normal] : Normal affect [Antalgic] : not antalgic [Spurling] : negative Spurling's Test [SLR] : negative straight leg raise

## 2021-04-21 NOTE — HISTORY OF PRESENT ILLNESS
[Back Pain] : back pain [Paroxysmal] : paroxysmal [8] : a maximum pain level of 8/10 [Aching] : aching [Burning] : burning [Shooting] : shooting [Sitting] : sitting [Walking] : walking [Medications] : medications [FreeTextEntry7] : Lower extremity pain  [de-identified] : b

## 2021-04-21 NOTE — ASSESSMENT
[FreeTextEntry1] : - Spoke with [employer] rep, will try to adjust setting\par - MRI L spine without contrast to see if there is other lumbar radicular pathology\par - F/U after MRI L spine, pt will contact [employer] about turning into MRI mode\par - Pt no signs fever or infection

## 2021-04-22 ENCOUNTER — NON-APPOINTMENT (OUTPATIENT)
Age: 51
End: 2021-04-22

## 2021-04-23 ENCOUNTER — NON-APPOINTMENT (OUTPATIENT)
Age: 51
End: 2021-04-23

## 2021-04-28 ENCOUNTER — OUTPATIENT (OUTPATIENT)
Dept: OUTPATIENT SERVICES | Facility: HOSPITAL | Age: 51
LOS: 1 days | Discharge: HOME | End: 2021-04-28

## 2021-04-28 DIAGNOSIS — Z98.890 OTHER SPECIFIED POSTPROCEDURAL STATES: Chronic | ICD-10-CM

## 2021-04-30 DIAGNOSIS — M54.5 LOW BACK PAIN: ICD-10-CM

## 2021-05-13 ENCOUNTER — NON-APPOINTMENT (OUTPATIENT)
Age: 51
End: 2021-05-13

## 2021-06-01 ENCOUNTER — NON-APPOINTMENT (OUTPATIENT)
Age: 51
End: 2021-06-01

## 2021-06-09 ENCOUNTER — EMERGENCY (EMERGENCY)
Facility: HOSPITAL | Age: 51
LOS: 0 days | Discharge: HOME | End: 2021-06-09
Attending: STUDENT IN AN ORGANIZED HEALTH CARE EDUCATION/TRAINING PROGRAM | Admitting: STUDENT IN AN ORGANIZED HEALTH CARE EDUCATION/TRAINING PROGRAM
Payer: MEDICAID

## 2021-06-09 ENCOUNTER — APPOINTMENT (OUTPATIENT)
Dept: NEUROSURGERY | Facility: CLINIC | Age: 51
End: 2021-06-09
Payer: MEDICAID

## 2021-06-09 VITALS
SYSTOLIC BLOOD PRESSURE: 152 MMHG | HEIGHT: 60 IN | RESPIRATION RATE: 16 BRPM | OXYGEN SATURATION: 96 % | DIASTOLIC BLOOD PRESSURE: 71 MMHG | HEART RATE: 73 BPM | TEMPERATURE: 98 F

## 2021-06-09 DIAGNOSIS — Z86.59 PERSONAL HISTORY OF OTHER MENTAL AND BEHAVIORAL DISORDERS: ICD-10-CM

## 2021-06-09 DIAGNOSIS — G89.29 OTHER CHRONIC PAIN: ICD-10-CM

## 2021-06-09 DIAGNOSIS — Z98.890 OTHER SPECIFIED POSTPROCEDURAL STATES: Chronic | ICD-10-CM

## 2021-06-09 DIAGNOSIS — Z87.39 PERSONAL HISTORY OF OTHER DISEASES OF THE MUSCULOSKELETAL SYSTEM AND CONNECTIVE TISSUE: ICD-10-CM

## 2021-06-09 DIAGNOSIS — J45.909 UNSPECIFIED ASTHMA, UNCOMPLICATED: ICD-10-CM

## 2021-06-09 DIAGNOSIS — Z88.5 ALLERGY STATUS TO NARCOTIC AGENT: ICD-10-CM

## 2021-06-09 DIAGNOSIS — M54.5 LOW BACK PAIN: ICD-10-CM

## 2021-06-09 DIAGNOSIS — K21.9 GASTRO-ESOPHAGEAL REFLUX DISEASE WITHOUT ESOPHAGITIS: ICD-10-CM

## 2021-06-09 DIAGNOSIS — Z98.890 OTHER SPECIFIED POSTPROCEDURAL STATES: ICD-10-CM

## 2021-06-09 PROCEDURE — 99213 OFFICE O/P EST LOW 20 MIN: CPT

## 2021-06-09 PROCEDURE — 99284 EMERGENCY DEPT VISIT MOD MDM: CPT

## 2021-06-09 RX ORDER — GABAPENTIN 400 MG/1
600 CAPSULE ORAL ONCE
Refills: 0 | Status: COMPLETED | OUTPATIENT
Start: 2021-06-09 | End: 2021-06-09

## 2021-06-09 RX ORDER — OXYCODONE HYDROCHLORIDE 5 MG/1
10 TABLET ORAL ONCE
Refills: 0 | Status: DISCONTINUED | OUTPATIENT
Start: 2021-06-09 | End: 2021-06-09

## 2021-06-09 RX ORDER — OXYCODONE AND ACETAMINOPHEN 5; 325 MG/1; MG/1
1 TABLET ORAL ONCE
Refills: 0 | Status: DISCONTINUED | OUTPATIENT
Start: 2021-06-09 | End: 2021-06-09

## 2021-06-09 RX ADMIN — OXYCODONE HYDROCHLORIDE 10 MILLIGRAM(S): 5 TABLET ORAL at 11:38

## 2021-06-09 RX ADMIN — GABAPENTIN 600 MILLIGRAM(S): 400 CAPSULE ORAL at 11:38

## 2021-06-09 RX ADMIN — OXYCODONE AND ACETAMINOPHEN 1 TABLET(S): 5; 325 TABLET ORAL at 13:44

## 2021-06-09 NOTE — ED PROVIDER NOTE - CLINICAL SUMMARY MEDICAL DECISION MAKING FREE TEXT BOX
51 yr old f that presents with chronic back pain. will give pt pain meds. pt reports improvement. will dc pt with pain management follow up and strict return precautions.

## 2021-06-09 NOTE — HISTORY OF PRESENT ILLNESS
[FreeTextEntry1] : Ms. Guevara feels pain in her whole back post SCS placement. She has not been adequately reprogrammed secondary to her covid infection and no in face meetings with [employer] rep. She feels she needs medication but Dr. Cardoso does not prescribe those through the clinic. Her pain is diffuse and does not follow a radicular pattern. Her wounds are well healed and no other signs of infection are noted.

## 2021-06-09 NOTE — ED PROVIDER NOTE - PROVIDER TOKENS
PROVIDER:[TOKEN:[[id number]],FOLLOWUP:[1-3 Days]],PROVIDER:[TOKEN:[[id number]],FOLLOWUP:[1-3 Days]]

## 2021-06-09 NOTE — ED PROVIDER NOTE - PROGRESS NOTE DETAILS
pt reports improvement. will dc pt with neurosurgery and pain management Discussed results with pt.  All questions were answered and return precautions discussed.  Pt is asx and comfortable at this time.  Unremarkable re-exam.  No further concerns at this time from pt.  Will follow up with PMD and NS.  Pt understands and agrees with tx plan.

## 2021-06-09 NOTE — PHYSICAL EXAM
[FreeTextEntry1] : Constitutional: tearful in discomfort\par HEENT: Normocephalic Atraumatic\par Psychiatric: Alert and oriented to all spheres, depressed, anxious\par Pulmonary: no respiratory distress\par Abdomen: non-distended\par Vascular/Extremities: no edema, no cyanosis, no clubbing\par \par \par Neurologic: \par CN II-XII grossly intact\par ROM: Full in cervical and lumbar spine\par Palpation: sever pain to LT over entireity of thoracolumbar spine\par Strength: pain limited exam, no obvious weakness\par Sensation:hypersensitivity over entireity of cervical/thoracic/lumbar spine\par Reflexes: \par               2+ patellar\par               2+ biceps\par               2+ ankle jerk\par              No Mari's\par              No clonus\par              No babinski\par \par Signs:\par SLR negative\par L'hermitte's negative\par \par Gait: antalgic\par \par \par \par \par

## 2021-06-09 NOTE — ED PROVIDER NOTE - OBJECTIVE STATEMENT
51 y.o female w/ hx of asthma, chronic back pain presents to the ED for evaluation of chronic low back pain x 2 weeks. States over past 2 weeks worsening pain, intermittent, throbbing, radiates down both legs, mild severity.  no saddle anesthesia, bowel/bladder incontinence/ retention, weakness of lower extremities, abd pain, flank pain, urinary sxs.

## 2021-06-09 NOTE — ED ADULT TRIAGE NOTE - CHIEF COMPLAINT QUOTE
Pt c/o back pain from neck to lower back; has chronic issue but worsened over the last 2 days. Pt has electro stim wires in her back but her MD said they can't go any higher on the settings. Next appt to be seen in office was 2 weeks from now, pt having difficulty walking d/t pain.

## 2021-06-09 NOTE — ED PROVIDER NOTE - PHYSICAL EXAMINATION
CONST: Well appearing in NAD  EYES:  Sclera and conjunctiva clear.  MS: mild TTP L lumbar paravertebral region, no midline tenderness, negative straight leg raise bilaterally, no weakness of lower extremities. Normal ROM in all extremities. No edema of lower extremities, no calf pain, radial pulses 2+ bilaterally  SKIN: Warm, dry, no acute rashes. Good turgor  NEURO: A&Ox3, No focal deficits. Strength 5/5 with no sensory deficits. Steady gait

## 2021-06-09 NOTE — ED PROVIDER NOTE - CARE PROVIDER_API CALL
Queenie Wilson)  [facility]  [address] [place] [address], [address]  [place] [address]  Phone: [phone]  Fax: [phone]  Follow Up Time: 1-3 Days    Zeb Forrest)  Anesthesiology; Pain Medicine  [address] [place] [address]  Phone: [phone]  Fax: [phone]  Follow Up Time: 1-3 Days

## 2021-06-09 NOTE — ED PROVIDER NOTE - CARE PROVIDERS DIRECT ADDRESSES
[email or web address]
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
## 2021-06-09 NOTE — ED PROVIDER NOTE - ATTENDING CONTRIBUTION TO CARE
51 yr old f w/ a pmh significant for asthma, chronic back pain, GERD, neck pain, PTSD who presents with back pain.  Pt states that she recently had a back stimulator placed (Feb 2021). Pt was taken off of her oral pain medications since the event and states that her pain has progressively gotten worse. Pt denies any urinary/fecal incontinence, IVDU, FEVERS, chills or any other complaints.     VITAL SIGNS: I have reviewed nursing notes and confirm.  CONSTITUTIONAL: non-toxic, well appearing  SKIN: no rash, no petechiae.  EYES: PERRL, EOMI, pink conjunctiva, anicteric  ENT: tongue midline, no exudates, MMM  NECK: Supple; no meningismus, no JVD  EXT: Normal ROM x4. No edema. No calves tenderness.  NEURO: Alert, oriented. CN2-12 intact, equal strength bilaterally, nl gait.  PSYCH: Cooperative, appropriate.    a/p  51 yr old f that presents with chronic back pain. will give pt pain meds. reassess. dispo pending

## 2021-06-09 NOTE — ED PROVIDER NOTE - NS ED ROS FT
Constitutional: See HPI.  Eyes: No visual changes, eye pain or discharge.  ENMT: No hearing changes, pain, discharge or infections  Cardiac: No SOB or edema. No chest pain with exertion.  Respiratory: No cough or respiratory distress  GI: No nausea, vomiting, diarrhea or abdominal pain.  : No dysuria, frequency or burning. No Discharge  MS: + back pain. No myalgia, muscle weakness, joint pain  Neuro: No headache or weakness  Skin: No skin rash.  Except as documented in the HPI, all other systems are negative.

## 2021-06-09 NOTE — ED PROVIDER NOTE - PATIENT PORTAL LINK FT
You can access the FollowMyHealth Patient Portal offered by [facility] by registering at the following website: [email or web address]. By joining [employer]’s FollowMyHealth portal, you will also be able to view your health information using other applications (apps) compatible with our system.

## 2021-06-16 ENCOUNTER — NON-APPOINTMENT (OUTPATIENT)
Age: 51
End: 2021-06-16

## 2021-06-16 ENCOUNTER — OUTPATIENT (OUTPATIENT)
Dept: OUTPATIENT SERVICES | Facility: HOSPITAL | Age: 51
LOS: 1 days | Discharge: HOME | End: 2021-06-16

## 2021-06-16 ENCOUNTER — APPOINTMENT (OUTPATIENT)
Dept: PAIN MANAGEMENT | Facility: CLINIC | Age: 51
End: 2021-06-16

## 2021-06-16 DIAGNOSIS — Z98.890 OTHER SPECIFIED POSTPROCEDURAL STATES: Chronic | ICD-10-CM

## 2021-06-16 NOTE — HISTORY OF PRESENT ILLNESS
[FreeTextEntry1] : She says that she had been seen in the hospital because she was in such bad pain in the original pattern she had. Since the reprogramming this past Monday, the original pain prior to the SCS was gone and the pain since the SCS is much more reduced. \par \par She says at times, when she is speaking too loudly she can get pain that goes up and down her spine, all the way to her feet. No urinary, fecal incontinence. She was doing physical therapy and had some pain.

## 2021-06-16 NOTE — REASON FOR VISIT
[Follow-Up Visit] : a follow-up pain management visit [FreeTextEntry2] : She recently had reprogramming on Monday, she says that she is feeling better. She no longer has severe pain. She says that with loud talking or going to the bathroom she can feel electrical vibtrations.

## 2021-06-16 NOTE — ASSESSMENT
[FreeTextEntry1] : 51 F w/ PMH SCS placement presenting for follow up\par \par - Did not yet have the MRI \par - Reprogrammed this past monday with elimination of the original pain and reduction of the pain aftrer SCS\par - Encouraged to have MRI T spine as ordered by NSGY\par - Gave patient letter she can return to physical therapy\par - Pt will follow up as needed if pain returns \par \par Patient came back to the room saying she has pain in the right side of the neck going into the R arm. A/w dropping items. 5/5 strength. Hoffmans negative. Sensation is normal. Complaing of pain in all of her fingers and tingling in all of her fingers on the right side. \par \par - MRI C spine without contast. No conchita flags

## 2021-06-16 NOTE — PHYSICAL EXAM
[Normal] : Abdomen soft, nontender [Normal muscle bulk without asymmetry] : normal muscle bulk without asymmetry [Walker] : ambulates with walker [SLR] : positive straight leg raise [LE] : Sensory: Intact in bilateral lower extremities

## 2021-06-17 ENCOUNTER — NON-APPOINTMENT (OUTPATIENT)
Age: 51
End: 2021-06-17

## 2021-06-18 ENCOUNTER — NON-APPOINTMENT (OUTPATIENT)
Age: 51
End: 2021-06-18

## 2021-06-18 ENCOUNTER — OUTPATIENT (OUTPATIENT)
Dept: OUTPATIENT SERVICES | Facility: HOSPITAL | Age: 51
LOS: 1 days | Discharge: HOME | End: 2021-06-18

## 2021-06-18 ENCOUNTER — APPOINTMENT (OUTPATIENT)
Dept: INTERNAL MEDICINE | Facility: CLINIC | Age: 51
End: 2021-06-18
Payer: MEDICAID

## 2021-06-18 VITALS
DIASTOLIC BLOOD PRESSURE: 77 MMHG | OXYGEN SATURATION: 98 % | WEIGHT: 154 LBS | SYSTOLIC BLOOD PRESSURE: 116 MMHG | HEIGHT: 60 IN | HEART RATE: 71 BPM | TEMPERATURE: 98 F | BODY MASS INDEX: 30.23 KG/M2

## 2021-06-18 DIAGNOSIS — Z98.890 OTHER SPECIFIED POSTPROCEDURAL STATES: Chronic | ICD-10-CM

## 2021-06-18 DIAGNOSIS — R68.89 OTHER GENERAL SYMPTOMS AND SIGNS: ICD-10-CM

## 2021-06-18 PROCEDURE — 99213 OFFICE O/P EST LOW 20 MIN: CPT | Mod: GC
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
## 2021-06-18 NOTE — REVIEW OF SYSTEMS
[Joint Pain] : joint pain [Joint Stiffness] : joint stiffness [Back Pain] : back pain [Fever] : no fever [Chills] : no chills [Night Sweats] : no night sweats [Recent Change In Weight] : ~T no recent weight change [Discharge] : no discharge [Earache] : no earache [Nasal Discharge] : no nasal discharge [Chest Pain] : no chest pain [Palpitations] : no palpitations [Orthopnea] : no orthopnea [Paroxysmal Nocturnal Dyspnea] : no paroxysmal nocturnal dyspnea [Shortness Of Breath] : no shortness of breath [Wheezing] : no wheezing [Cough] : no cough [Abdominal Pain] : no abdominal pain [Nausea] : no nausea [Constipation] : no constipation [Heartburn] : no heartburn [Vomiting] : no vomiting [Incontinence] : no incontinence [Dysuria] : no dysuria [Hematuria] : no hematuria [Frequency] : no frequency [Joint Swelling] : no joint swelling [Muscle Weakness] : no muscle weakness [Muscle Pain] : no muscle pain [Itching] : no itching [Skin Rash] : no skin rash [Headache] : no headache [Dizziness] : no dizziness [Fainting] : no fainting [Memory Loss] : no memory loss [Unsteady Walking] : no ataxia

## 2021-06-18 NOTE — PHYSICAL EXAM
[No Acute Distress] : no acute distress [Well Developed] : well developed [Well Nourished] : well nourished [Normal Voice/Communication] : normal voice/communication [Normal Sclera/Conjunctiva] : normal sclera/conjunctiva [Normal Outer Ear/Nose] : the outer ears and nose were normal in appearance [Normal Oropharynx] : the oropharynx was normal [No JVD] : no jugular venous distention [No Lymphadenopathy] : no lymphadenopathy [Supple] : supple [No Respiratory Distress] : no respiratory distress  [No Accessory Muscle Use] : no accessory muscle use [Clear to Auscultation] : lungs were clear to auscultation bilaterally [Normal Rate] : normal rate  [Regular Rhythm] : with a regular rhythm [Normal S1, S2] : normal S1 and S2 [No Murmur] : no murmur heard [No Carotid Bruits] : no carotid bruits [No Abdominal Bruit] : a ~M bruit was not heard ~T in the abdomen [No Varicosities] : no varicosities [Pedal Pulses Present] : the pedal pulses are present [No Edema] : there was no peripheral edema [No Palpable Aorta] : no palpable aorta [No Rash] : no rash [Coordination Grossly Intact] : coordination grossly intact [No Focal Deficits] : no focal deficits [Normal Gait] : normal gait [Deep Tendon Reflexes (DTR)] : deep tendon reflexes were 2+ and symmetric

## 2021-06-18 NOTE — HISTORY OF PRESENT ILLNESS
[FreeTextEntry1] : Follow up [de-identified] : 51 year old female patient\par - Pre DM Last hba1c 5.9 12/09/20. Not on meds\par - GERD. home med Omerpazole 40mg QD\par - Asthma Controlled. Home meds Albuterol, Montelukast, Diphenhydramine\par - History of fall in 2010 followed by neck, back, and shoulder pain status post multiple steroid injections and spinal surgeries and lumbar fusion in 2018 s/p neurosurgery follow up with Dr Wilson s/p SCS implant with some improvement. Recently followed up on 06/09/21 s/p referral to new pain maganement Dr Diana s/p recommendation for MR FELIPE. Home meds Robaxin 750mg, Gabapentin 800mg TID, Naproxen 500mg BID PRN, Acetaminophen 500mg BID PRN (off oxycodone since her insurance no longer covers the pain management team she used to follow up with)\par \par Patient is presenting for follow up.\par She reports some diffuse sharp pain along posterior aspect of neck and shoulder with improvement in pain along lower back.\par She denies radiation of pain.\par Pain is exacerbated with movement and partially relieved with pain meds. \par She denies lower extremity tingling or urinary incontinence or recent trauma.\par ROS negative for fever or chills or  or URTI symptoms.

## 2021-06-28 DIAGNOSIS — M54.9 DORSALGIA, UNSPECIFIED: ICD-10-CM

## 2021-06-28 DIAGNOSIS — Z98.1 ARTHRODESIS STATUS: ICD-10-CM

## 2021-06-28 DIAGNOSIS — Z00.00 ENCOUNTER FOR GENERAL ADULT MEDICAL EXAMINATION WITHOUT ABNORMAL FINDINGS: ICD-10-CM

## 2021-06-28 DIAGNOSIS — R68.89 OTHER GENERAL SYMPTOMS AND SIGNS: ICD-10-CM

## 2021-06-28 DIAGNOSIS — J45.909 UNSPECIFIED ASTHMA, UNCOMPLICATED: ICD-10-CM

## 2021-06-28 DIAGNOSIS — K21.9 GASTRO-ESOPHAGEAL REFLUX DISEASE WITHOUT ESOPHAGITIS: ICD-10-CM

## 2021-06-28 DIAGNOSIS — F41.1 GENERALIZED ANXIETY DISORDER: ICD-10-CM

## 2021-07-07 ENCOUNTER — OUTPATIENT (OUTPATIENT)
Dept: OUTPATIENT SERVICES | Facility: HOSPITAL | Age: 51
LOS: 1 days | Discharge: HOME | End: 2021-07-07

## 2021-07-07 ENCOUNTER — APPOINTMENT (OUTPATIENT)
Dept: NEUROSURGERY | Facility: CLINIC | Age: 51
End: 2021-07-07
Payer: MEDICAID

## 2021-07-07 DIAGNOSIS — Z98.890 OTHER SPECIFIED POSTPROCEDURAL STATES: Chronic | ICD-10-CM

## 2021-07-07 DIAGNOSIS — M54.5 LOW BACK PAIN: ICD-10-CM

## 2021-07-07 PROCEDURE — 99213 OFFICE O/P EST LOW 20 MIN: CPT

## 2021-07-07 NOTE — HISTORY OF PRESENT ILLNESS
[FreeTextEntry1] : Marta SCS was reprogrammed under my supervision today with [employer] CST with good relief of both back and leg pain. SHe is no walking just with a cane. Pre implant she was usign wheelchair and walker. She is pending MRI images.

## 2021-07-07 NOTE — PHYSICAL EXAM
[FreeTextEntry1] : Constitutional: Well appearing, no distress\par HEENT: Normocephalic Atraumatic\par Psychiatric: Alert and oriented to all spheres, normal mood\par Pulmonary: no respiratory distress\par Abdomen: non-distended\par Vascular/Extremities: no edema, no cyanosis, no clubbing\par \par \par Neurologic: \par CN II-XII grossly intact\par ROM: Full in cervical and lumbar spine\par Palpation: no pain to palpation in cervical spine, no pain to palpation in lumbar spine\par Strength: Full strength in all major muscle groups, no atrophy\par Sensation: Full sensation to light touch in all extremities\par Reflexes: \par               2+ patellar\par               2+ biceps\par               2+ ankle jerk\par              No Mari's\par              No clonus\par              No babinski\par \par Signs:\par SLR negative\par L'hermitte's negative\par \par Gait: antalgic\par \par \par \par \par

## 2021-07-08 ENCOUNTER — RESULT REVIEW (OUTPATIENT)
Age: 51
End: 2021-07-08

## 2021-07-08 ENCOUNTER — APPOINTMENT (OUTPATIENT)
Dept: INTERNAL MEDICINE | Facility: CLINIC | Age: 51
End: 2021-07-08
Payer: MEDICAID

## 2021-07-08 ENCOUNTER — OUTPATIENT (OUTPATIENT)
Dept: OUTPATIENT SERVICES | Facility: HOSPITAL | Age: 51
LOS: 1 days | Discharge: HOME | End: 2021-07-08
Payer: MEDICAID

## 2021-07-08 ENCOUNTER — NON-APPOINTMENT (OUTPATIENT)
Age: 51
End: 2021-07-08

## 2021-07-08 ENCOUNTER — OUTPATIENT (OUTPATIENT)
Dept: OUTPATIENT SERVICES | Facility: HOSPITAL | Age: 51
LOS: 1 days | Discharge: HOME | End: 2021-07-08

## 2021-07-08 VITALS
DIASTOLIC BLOOD PRESSURE: 70 MMHG | WEIGHT: 153 LBS | HEART RATE: 67 BPM | SYSTOLIC BLOOD PRESSURE: 110 MMHG | BODY MASS INDEX: 30.04 KG/M2 | HEIGHT: 60 IN | OXYGEN SATURATION: 97 % | TEMPERATURE: 98.3 F

## 2021-07-08 DIAGNOSIS — Z98.890 OTHER SPECIFIED POSTPROCEDURAL STATES: Chronic | ICD-10-CM

## 2021-07-08 DIAGNOSIS — M47.812 SPONDYLOSIS WITHOUT MYELOPATHY OR RADICULOPATHY, CERVICAL REGION: ICD-10-CM

## 2021-07-08 DIAGNOSIS — M54.9 DORSALGIA, UNSPECIFIED: ICD-10-CM

## 2021-07-08 DIAGNOSIS — N39.3 STRESS INCONTINENCE (FEMALE) (MALE): ICD-10-CM

## 2021-07-08 DIAGNOSIS — R92.8 OTHER ABNORMAL AND INCONCLUSIVE FINDINGS ON DIAGNOSTIC IMAGING OF BREAST: ICD-10-CM

## 2021-07-08 PROCEDURE — 72148 MRI LUMBAR SPINE W/O DYE: CPT | Mod: 26

## 2021-07-08 PROCEDURE — 72141 MRI NECK SPINE W/O DYE: CPT | Mod: 26

## 2021-07-08 PROCEDURE — 99212 OFFICE O/P EST SF 10 MIN: CPT | Mod: GC

## 2021-07-08 NOTE — PHYSICAL EXAM
[Examination Of The Breasts] : a normal appearance [No Masses] : no breast masses were palpable [Labia Majora] : normal [Labia Minora] : normal [Atrophy] : atrophy [Cystocele] : a cystocele [Rectocele] : a rectocele [Normal] : normal [Uterine Adnexae] : normal

## 2021-07-10 ENCOUNTER — RESULT REVIEW (OUTPATIENT)
Age: 51
End: 2021-07-10

## 2021-07-10 ENCOUNTER — OUTPATIENT (OUTPATIENT)
Dept: OUTPATIENT SERVICES | Facility: HOSPITAL | Age: 51
LOS: 1 days | Discharge: HOME | End: 2021-07-10
Payer: MEDICAID

## 2021-07-10 DIAGNOSIS — Z98.890 OTHER SPECIFIED POSTPROCEDURAL STATES: Chronic | ICD-10-CM

## 2021-07-10 DIAGNOSIS — Z12.31 ENCOUNTER FOR SCREENING MAMMOGRAM FOR MALIGNANT NEOPLASM OF BREAST: ICD-10-CM

## 2021-07-10 PROCEDURE — 77067 SCR MAMMO BI INCL CAD: CPT | Mod: 26

## 2021-07-10 PROCEDURE — 77063 BREAST TOMOSYNTHESIS BI: CPT | Mod: 26

## 2021-07-12 DIAGNOSIS — M54.9 DORSALGIA, UNSPECIFIED: ICD-10-CM

## 2021-07-12 DIAGNOSIS — Z02.9 ENCOUNTER FOR ADMINISTRATIVE EXAMINATIONS, UNSPECIFIED: ICD-10-CM

## 2021-07-12 DIAGNOSIS — Z01.419 ENCOUNTER FOR GYNECOLOGICAL EXAMINATION (GENERAL) (ROUTINE) WITHOUT ABNORMAL FINDINGS: ICD-10-CM

## 2021-07-12 DIAGNOSIS — R92.8 OTHER ABNORMAL AND INCONCLUSIVE FINDINGS ON DIAGNOSTIC IMAGING OF BREAST: ICD-10-CM

## 2021-07-12 DIAGNOSIS — M47.812 SPONDYLOSIS WITHOUT MYELOPATHY OR RADICULOPATHY, CERVICAL REGION: ICD-10-CM

## 2021-07-13 ENCOUNTER — RX RENEWAL (OUTPATIENT)
Age: 51
End: 2021-07-13

## 2021-07-13 NOTE — REVIEW OF SYSTEMS
[Constipation] : constipation [Negative] : Heme/Lymph [Abdominal Pain] : no abdominal pain [Diarrhea] : diarrhea [Heartburn] : no heartburn [Vomiting] : no vomiting [Melena] : no melena [Bloating] : no bloating [Nausea] : no nausea [FreeTextEntry7] : constipation for past year

## 2021-07-13 NOTE — PLAN
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

## 2021-07-13 NOTE — HISTORY OF PRESENT ILLNESS
[Patient reported mammogram was normal] : Patient reported mammogram was normal [Patient reported PAP Smear was normal] : Patient reported PAP Smear was normal [postmenopausal] : postmenopausal [N] : Patient is not sexually active [Previously active] : previously active [Men] : men [Vaginal] : vaginal [No] : No [FreeTextEntry1] : Patient is a 50yo female with PMH of pre-DM, GERD, asthma, and history of fall 2010 with resultant neck, back, and shoulder pain s/p multiple steroid injections and spinal surgeries and lumbar fusion in 2018 s/p neurosurgery follow up with Dr Wilson s/p SCS implant presenting to the office for annual GYN visit, including mammography and Pap smear. She was previously sexually active with men. Denies any previous STDs. Has been pregnant 7 times with two miscarriages and 5 fullterm births. Has been postmenopausal since 1992. Patient also endorses some urinary incontinence with coughing and sneezing.  [TextBox_4] : No history of abnormal pap smears, no history of STDs, no abnormal bleeding [Mammogramdate] : 2019 [PapSmeardate] : 2019 [LMPDate] : 1992 [PGxTotal] : 7 [[facility]] : 5 [TextBox_6] : 1

## 2021-07-22 LAB
25(OH)D3 SERPL-MCNC: 31 NG/ML
ALBUMIN SERPL ELPH-MCNC: 4.6 G/DL
ALP BLD-CCNC: 113 U/L
ALT SERPL-CCNC: 42 U/L
ANION GAP SERPL CALC-SCNC: 11 MMOL/L
AST SERPL-CCNC: 18 U/L
BASOPHILS # BLD AUTO: 0.01 K/UL
BASOPHILS NFR BLD AUTO: 0.2 %
BILIRUB SERPL-MCNC: 0.4 MG/DL
BUN SERPL-MCNC: 9 MG/DL
CALCIUM SERPL-MCNC: 9.4 MG/DL
CHLORIDE SERPL-SCNC: 105 MMOL/L
CHOLEST SERPL-MCNC: 181 MG/DL
CO2 SERPL-SCNC: 26 MMOL/L
CREAT SERPL-MCNC: 0.6 MG/DL
CYTOLOGY CVX/VAG DOC THIN PREP: ABNORMAL
EOSINOPHIL # BLD AUTO: 0.05 K/UL
EOSINOPHIL NFR BLD AUTO: 0.9 %
ESTIMATED AVERAGE GLUCOSE: 117 MG/DL
GLUCOSE SERPL-MCNC: 119 MG/DL
HBA1C MFR BLD HPLC: 5.7 %
HCT VFR BLD CALC: 41.1 %
HDLC SERPL-MCNC: 53 MG/DL
HGB BLD-MCNC: 13.2 G/DL
HPV HIGH+LOW RISK DNA PNL CVX: NOT DETECTED
IMM GRANULOCYTES NFR BLD AUTO: 0.2 %
LDLC SERPL CALC-MCNC: 107 MG/DL
LYMPHOCYTES # BLD AUTO: 1.7 K/UL
LYMPHOCYTES NFR BLD AUTO: 32.3 %
MAN DIFF?: NORMAL
MCHC RBC-ENTMCNC: 29.3 PG
MCHC RBC-ENTMCNC: 32.1 G/DL
MCV RBC AUTO: 91.1 FL
MONOCYTES # BLD AUTO: 0.42 K/UL
MONOCYTES NFR BLD AUTO: 8 %
NEUTROPHILS # BLD AUTO: 3.08 K/UL
NEUTROPHILS NFR BLD AUTO: 58.4 %
NONHDLC SERPL-MCNC: 128 MG/DL
PLATELET # BLD AUTO: 167 K/UL
POTASSIUM SERPL-SCNC: 4.6 MMOL/L
PROT SERPL-MCNC: 7.7 G/DL
RBC # BLD: 4.51 M/UL
RBC # FLD: 12.5 %
SODIUM SERPL-SCNC: 142 MMOL/L
TRIGL SERPL-MCNC: 121 MG/DL
TSH SERPL-ACNC: 1.5 UIU/ML
WBC # FLD AUTO: 5.27 K/UL

## 2021-08-04 ENCOUNTER — APPOINTMENT (OUTPATIENT)
Dept: NEUROSURGERY | Facility: CLINIC | Age: 51
End: 2021-08-04
Payer: MEDICAID

## 2021-08-04 DIAGNOSIS — M47.817 SPONDYLOSIS W/OUT MYELOPATHY OR RADICULOPATHY, LUMBOSACRAL REGION: ICD-10-CM

## 2021-08-04 PROCEDURE — 99214 OFFICE O/P EST MOD 30 MIN: CPT

## 2021-08-04 NOTE — ASSESSMENT
[FreeTextEntry1] : She would like to have a FAWN with pain management for her neck pain. If no improvement with injections then a CT c-spine will be ordered. \par \par Defers PT for now. \par \par Will monitor pain relief with SCS stimulator.

## 2021-08-04 NOTE — PHYSICAL EXAM
[FreeTextEntry1] : Constitutional: Well appearing, no distress\par HEENT: Normocephalic Atraumatic\par Psychiatric: Alert and oriented to all spheres, normal mood. \par Pulmonary: no respiratory distress\par \par Neurologic: \par CN II-XII grossly intact\par ROM: Mild restriction in ROM cervical and lumbar spine. \par Palpation: trapezial spasms palpable. \par Strength: intact. \par Sensation: intact. \par Reflexes: symmetric. \par No Mari's\par SLR negative\par Gait: antalgic, + cane. \par

## 2021-08-04 NOTE — HISTORY OF PRESENT ILLNESS
[FreeTextEntry1] : Patients SCS was reprogrammed today with the [employer] rep. She notes improvement of her back and leg pain with reprogramming. She continues to have neck pain into her shoulders. No recent conservative management. \par \par Updated MRIs were reviewed today. \par - MRI lumbar spine VZ: lumbar spondylosis, postop changes L5/S1, foraminal stenosis L4/5. \par - MRI cervical spine VZ: post op changes C3/4 C4/5, adjacent spondylosis, no cord compression. \par Incidental prominent lymph nodes, nonspecific per report. Will refer to patients PCP for evaluation. \par \par \par

## 2021-08-06 ENCOUNTER — OUTPATIENT (OUTPATIENT)
Dept: OUTPATIENT SERVICES | Facility: HOSPITAL | Age: 51
LOS: 1 days | Discharge: HOME | End: 2021-08-06

## 2021-08-06 ENCOUNTER — NON-APPOINTMENT (OUTPATIENT)
Age: 51
End: 2021-08-06

## 2021-08-06 ENCOUNTER — APPOINTMENT (OUTPATIENT)
Dept: GASTROENTEROLOGY | Facility: CLINIC | Age: 51
End: 2021-08-06
Payer: MEDICAID

## 2021-08-06 VITALS
HEART RATE: 61 BPM | OXYGEN SATURATION: 97 % | WEIGHT: 153 LBS | RESPIRATION RATE: 6 BRPM | DIASTOLIC BLOOD PRESSURE: 76 MMHG | TEMPERATURE: 97.8 F | HEIGHT: 60 IN | BODY MASS INDEX: 30.04 KG/M2 | SYSTOLIC BLOOD PRESSURE: 111 MMHG

## 2021-08-06 DIAGNOSIS — Z98.890 OTHER SPECIFIED POSTPROCEDURAL STATES: Chronic | ICD-10-CM

## 2021-08-06 PROCEDURE — 99213 OFFICE O/P EST LOW 20 MIN: CPT | Mod: GC

## 2021-08-06 NOTE — PHYSICAL EXAM
[General Appearance - Alert] : alert [General Appearance - In No Acute Distress] : in no acute distress [Auscultation Breath Sounds / Voice Sounds] : lungs were clear to auscultation bilaterally [Heart Rate And Rhythm] : heart rate was normal and rhythm regular [Heart Sounds] : normal S1 and S2 [Heart Sounds Gallop] : no gallops [Murmurs] : no murmurs [Heart Sounds Pericardial Friction Rub] : no pericardial rub [Bowel Sounds] : normal bowel sounds [Abdomen Soft] : soft [Abdomen Tenderness] : non-tender [Abdomen Mass (___ Cm)] : no abdominal mass palpated [No CVA Tenderness] : no ~M costovertebral angle tenderness [No Spinal Tenderness] : no spinal tenderness [Abnormal Walk] : normal gait [Nail Clubbing] : no clubbing  or cyanosis of the fingernails [Musculoskeletal - Swelling] : no joint swelling seen [Motor Tone] : muscle strength and tone were normal [Skin Color & Pigmentation] : normal skin color and pigmentation [Skin Turgor] : normal skin turgor [] : no rash [Deep Tendon Reflexes (DTR)] : deep tendon reflexes were 2+ and symmetric [Sensation] : the sensory exam was normal to light touch and pinprick [No Focal Deficits] : no focal deficits

## 2021-08-11 NOTE — ED ADULT NURSE NOTE - NS ED NURSE LEVEL OF CONSCIOUSNESS ORIENTATION
Oriented - self; Oriented - place; Oriented - time Staged Advancement Flap Text: The defect edges were debeveled with a #15 scalpel blade.  Given the location of the defect, shape of the defect and the proximity to free margins a staged advancement flap was deemed most appropriate.  Using a sterile surgical marker, an appropriate advancement flap was drawn incorporating the defect and placing the expected incisions within the relaxed skin tension lines where possible. The area thus outlined was incised deep to adipose tissue with a #15 scalpel blade.  The skin margins were undermined to an appropriate distance in all directions utilizing iris scissors.

## 2021-08-12 NOTE — HISTORY OF PRESENT ILLNESS
[de-identified] : 50 y/o FM w/ pMHx of GERD on Omperazole 40 Qdaily, asthma, Several spine surgeries on multiple pain medication including ibuprofen and HTN presents for evaluation of epigastric pain. Patient is non complaint with her medication and takes it an 1 hour after food consumption. She has been endorsing epigastric pain for the past few weeks that occurs 2-3 after food which is crampy in nature and without radiation. She also endorses occasional nocturnal symptoms. Sometimes sx are a/w dysphagia but without vomiting, odynophagia or weight loss or red blood per rectum

## 2021-08-12 NOTE — ASSESSMENT
[FreeTextEntry1] : 50 y/o FM w/ pMHx of GERD on Omperazole 40 Qdaily, asthma, Several spine surgeries on multiple pain medication including ibuprofen and HTN presents for evaulation of epigastric pain. \par \par #Epigastric pain w/ occasional dysphagia to solid\par - EGD (2018): moderate to severe gastritsi. no hp\par - patient re-educated about proper use of omperazole - will increase to 40 BID\par - will schedule for EGD and RUQ sono\par - if EGD negative will consider CTAP\par \par #Colon cancer surveillance \par - CF (2020): good prep. diverticulosis in AC. I/E hemorrhiods. small avm in TC. 7mm and 5mm TAs in cecum w/o removal\par - repeat colonoscopy in 5 years as per endoscopist who performed procedure

## 2021-08-13 ENCOUNTER — APPOINTMENT (OUTPATIENT)
Dept: OBGYN | Facility: CLINIC | Age: 51
End: 2021-08-13

## 2021-08-13 DIAGNOSIS — R10.9 UNSPECIFIED ABDOMINAL PAIN: ICD-10-CM

## 2021-08-18 ENCOUNTER — RX RENEWAL (OUTPATIENT)
Age: 51
End: 2021-08-18

## 2021-08-29 ENCOUNTER — OUTPATIENT (OUTPATIENT)
Dept: OUTPATIENT SERVICES | Facility: HOSPITAL | Age: 51
LOS: 1 days | Discharge: HOME | End: 2021-08-29
Payer: MEDICAID

## 2021-08-29 DIAGNOSIS — Z98.890 OTHER SPECIFIED POSTPROCEDURAL STATES: Chronic | ICD-10-CM

## 2021-08-29 DIAGNOSIS — R10.9 UNSPECIFIED ABDOMINAL PAIN: ICD-10-CM

## 2021-08-29 PROCEDURE — 76705 ECHO EXAM OF ABDOMEN: CPT | Mod: 26

## 2021-09-24 ENCOUNTER — NON-APPOINTMENT (OUTPATIENT)
Age: 51
End: 2021-09-24

## 2021-09-26 ENCOUNTER — LABORATORY RESULT (OUTPATIENT)
Age: 51
End: 2021-09-26

## 2021-09-26 ENCOUNTER — OUTPATIENT (OUTPATIENT)
Dept: OUTPATIENT SERVICES | Facility: HOSPITAL | Age: 51
LOS: 1 days | Discharge: HOME | End: 2021-09-26

## 2021-09-26 DIAGNOSIS — Z11.59 ENCOUNTER FOR SCREENING FOR OTHER VIRAL DISEASES: ICD-10-CM

## 2021-09-26 DIAGNOSIS — Z98.890 OTHER SPECIFIED POSTPROCEDURAL STATES: Chronic | ICD-10-CM

## 2021-09-29 ENCOUNTER — OUTPATIENT (OUTPATIENT)
Dept: OUTPATIENT SERVICES | Facility: HOSPITAL | Age: 51
LOS: 1 days | Discharge: HOME | End: 2021-09-29
Payer: MEDICAID

## 2021-09-29 ENCOUNTER — TRANSCRIPTION ENCOUNTER (OUTPATIENT)
Age: 51
End: 2021-09-29

## 2021-09-29 ENCOUNTER — RESULT REVIEW (OUTPATIENT)
Age: 51
End: 2021-09-29

## 2021-09-29 VITALS
SYSTOLIC BLOOD PRESSURE: 111 MMHG | DIASTOLIC BLOOD PRESSURE: 67 MMHG | RESPIRATION RATE: 18 BRPM | WEIGHT: 154.1 LBS | HEART RATE: 69 BPM | TEMPERATURE: 98 F

## 2021-09-29 VITALS — DIASTOLIC BLOOD PRESSURE: 76 MMHG | HEART RATE: 60 BPM | RESPIRATION RATE: 18 BRPM | SYSTOLIC BLOOD PRESSURE: 126 MMHG

## 2021-09-29 DIAGNOSIS — Z98.890 OTHER SPECIFIED POSTPROCEDURAL STATES: Chronic | ICD-10-CM

## 2021-09-29 PROCEDURE — 88305 TISSUE EXAM BY PATHOLOGIST: CPT | Mod: 26

## 2021-09-29 PROCEDURE — 88312 SPECIAL STAINS GROUP 1: CPT | Mod: 26

## 2021-09-29 PROCEDURE — 43239 EGD BIOPSY SINGLE/MULTIPLE: CPT

## 2021-09-29 NOTE — CHART NOTE - NSCHARTNOTEFT_GEN_A_CORE
PACU ANESTHESIA ADMISSION NOTE      Procedure:   Post op diagnosis:      ____  Intubated  TV:______       Rate: ______      FiO2: ______    ___x_  Patent Airway    _x___  Full return of protective reflexes    __x__  Full recovery from anesthesia / back to baseline     Vitals:   T:           R: 15                 BP:    131/81              Sat: 99                  P: 79      Mental Status:  x____ Awake   __x___ Alert   _____ Drowsy   _____ Sedated    Nausea/Vomiting:  ____ NO  ______Yes,   See Post - Op Orders          Pain Scale (0-10):  _____    Treatment: ____ None    ____ See Post - Op/PCA Orders    Post - Operative Fluids:   ____ Oral   ____ See Post - Op Orders    Plan: Discharge:   x____Home       _____Floor     _____Critical Care    _____  Other:_________________    Comments:

## 2021-09-29 NOTE — H&P PST ADULT - HISTORY OF PRESENT ILLNESS
48 y/o FM w/ pMHx of GERD on Omperazole 40 Qdaily, asthma, Several spine surgeries on multiple pain medication including ibuprofen and HTN presents for evaluation of epigastric pain. Patient is non complaint with her medication and takes it an 1 hour after food consumption. She has been endorsing epigastric pain for the past few weeks that occurs 2-3 after food which is crampy in nature and without radiation. She also endorses occasional nocturnal symptoms. Sometimes sx are a/w dysphagia but without vomiting, odynophagia or weight loss or red blood per rectum.

## 2021-09-29 NOTE — ASU DISCHARGE PLAN (ADULT/PEDIATRIC) - ASU DC SPECIAL INSTRUCTIONSFT
Await pathology results   Follow up in [facility] clinic next available Await pathology results   Follow up in [facility] clinic next available  omeprazole 20 mg po daily on empty stomach 30 min before food

## 2021-09-29 NOTE — ASU DISCHARGE PLAN (ADULT/PEDIATRIC) - FOR NEXT 24 HOURS DO NOT:
Plan: Given written information on HS from visual[facility] and reviewed with pt
Initiate Treatment: clindamycin 1% lotion to AA qd after shower
Detail Level: Detailed
Otc Regimen: Panoxyl 10% creamy wash qd in shower
Statement Selected

## 2021-10-04 LAB — SURGICAL PATHOLOGY STUDY: SIGNIFICANT CHANGE UP

## 2021-10-05 DIAGNOSIS — K29.50 UNSPECIFIED CHRONIC GASTRITIS WITHOUT BLEEDING: ICD-10-CM

## 2021-10-05 DIAGNOSIS — K44.9 DIAPHRAGMATIC HERNIA WITHOUT OBSTRUCTION OR GANGRENE: ICD-10-CM

## 2021-10-05 DIAGNOSIS — Z91.013 ALLERGY TO SEAFOOD: ICD-10-CM

## 2021-10-05 DIAGNOSIS — B96.81 HELICOBACTER PYLORI [H. PYLORI] AS THE CAUSE OF DISEASES CLASSIFIED ELSEWHERE: ICD-10-CM

## 2021-10-05 DIAGNOSIS — K29.80 DUODENITIS WITHOUT BLEEDING: ICD-10-CM

## 2021-10-05 DIAGNOSIS — R13.10 DYSPHAGIA, UNSPECIFIED: ICD-10-CM

## 2021-10-05 DIAGNOSIS — D13.1 BENIGN NEOPLASM OF STOMACH: ICD-10-CM

## 2021-10-06 ENCOUNTER — OUTPATIENT (OUTPATIENT)
Dept: OUTPATIENT SERVICES | Facility: HOSPITAL | Age: 51
LOS: 1 days | Discharge: HOME | End: 2021-10-06

## 2021-10-06 ENCOUNTER — APPOINTMENT (OUTPATIENT)
Dept: PAIN MANAGEMENT | Facility: CLINIC | Age: 51
End: 2021-10-06

## 2021-10-06 ENCOUNTER — NON-APPOINTMENT (OUTPATIENT)
Age: 51
End: 2021-10-06

## 2021-10-06 DIAGNOSIS — Z98.890 OTHER SPECIFIED POSTPROCEDURAL STATES: Chronic | ICD-10-CM

## 2021-10-06 NOTE — PHYSICAL EXAM
[Normal muscle bulk without asymmetry] : normal muscle bulk without asymmetry [Cane] : ambulates with cane [LE] : 5/5 motor strength in bilateral lower extremities [Normal] : Normal affect [Ataxic] : not ataxic [Antalgic] : not antalgic [SLR] : negative straight leg raise [de-identified] : Pain with forward flexion of cervical spine [de-identified] : Decrease sensation in R c6

## 2021-10-06 NOTE — REVIEW OF SYSTEMS
[Neck Pain] : neck pain [Numbness] : numbness [Negative] : Heme/Lymph [FreeTextEntry9] : AS HPI [de-identified] : In the R hand at times

## 2021-10-06 NOTE — HISTORY OF PRESENT ILLNESS
[Neck Pain] : neck pain [___ yrs] : [unfilled] year(s) ago [Constant] : constant [8] : a current pain level of 8/10 [10] : a maximum pain level of 10/10 [Sharp] : sharp [Shooting] : shooting [Lifting] : lifting [Looking Down] : looking down [Coughing] : coughing [Sneezing] : sneezing [Looking Up] : looking up [FreeTextEntry7] : R and L side of the neck into the b/l UE R>L [de-identified] : with exacerbations in which the pain shoots down the arms, but R>L

## 2021-10-06 NOTE — REASON FOR VISIT
[Follow-Up Visit] : a follow-up pain management visit [FreeTextEntry2] : for cervical radicular pain in the b/l UE

## 2021-10-13 ENCOUNTER — APPOINTMENT (OUTPATIENT)
Dept: NEUROSURGERY | Facility: CLINIC | Age: 51
End: 2021-10-13
Payer: MEDICAID

## 2021-10-13 DIAGNOSIS — M54.16 RADICULOPATHY, LUMBAR REGION: ICD-10-CM

## 2021-10-13 PROCEDURE — 99213 OFFICE O/P EST LOW 20 MIN: CPT

## 2021-10-13 RX ORDER — OXYCODONE AND ACETAMINOPHEN 5; 325 MG/1; MG/1
5-325 TABLET ORAL EVERY 8 HOURS
Qty: 21 | Refills: 0 | Status: DISCONTINUED | COMMUNITY
Start: 2021-04-09 | End: 2021-10-13

## 2021-10-13 NOTE — HISTORY OF PRESENT ILLNESS
[FreeTextEntry1] : Ms. Guevara's SCS was reprogrammed today with the [employer] rep. She notes improvement of her back and leg pain with reprogramming. She now feels coverage in her feet as well. She has chronic neck pain which remains stable.

## 2021-10-13 NOTE — PHYSICAL EXAM
[FreeTextEntry1] : \par Constitutional: Well appearing, no distress\par HEENT: Normocephalic Atraumatic\par Psychiatric: Alert and oriented to all spheres, normal mood. \par Pulmonary: no respiratory distress\par \par Neurologic: \par CN II-XII grossly intact\par ROM: Mild restriction in ROM cervical and lumbar spine. \par Palpation: trapezial spasms palpable. \par Strength: intact. \par Sensation: intact. \par Reflexes: symmetric. \par No Mari's\par SLR negative\par Gait: antalgic, + cane. \par

## 2021-10-15 ENCOUNTER — OUTPATIENT (OUTPATIENT)
Dept: OUTPATIENT SERVICES | Facility: HOSPITAL | Age: 51
LOS: 1 days | Discharge: HOME | End: 2021-10-15

## 2021-10-15 ENCOUNTER — NON-APPOINTMENT (OUTPATIENT)
Age: 51
End: 2021-10-15

## 2021-10-15 ENCOUNTER — APPOINTMENT (OUTPATIENT)
Dept: GASTROENTEROLOGY | Facility: CLINIC | Age: 51
End: 2021-10-15
Payer: MEDICAID

## 2021-10-15 VITALS
WEIGHT: 156 LBS | HEIGHT: 60 IN | TEMPERATURE: 96.2 F | RESPIRATION RATE: 16 BRPM | SYSTOLIC BLOOD PRESSURE: 143 MMHG | HEART RATE: 65 BPM | DIASTOLIC BLOOD PRESSURE: 78 MMHG | BODY MASS INDEX: 30.63 KG/M2 | OXYGEN SATURATION: 98 %

## 2021-10-15 DIAGNOSIS — Z98.890 OTHER SPECIFIED POSTPROCEDURAL STATES: Chronic | ICD-10-CM

## 2021-10-15 PROCEDURE — 99213 OFFICE O/P EST LOW 20 MIN: CPT | Mod: GC

## 2021-10-15 NOTE — PHYSICAL EXAM
[General Appearance - Alert] : alert [General Appearance - In No Acute Distress] : in no acute distress [Sclera] : the sclera and conjunctiva were normal [Outer Ear] : the ears and nose were normal in appearance [Hearing Threshold Finger Rub Not [place]] : hearing was normal [Neck Appearance] : the appearance of the neck was normal [Neck Cervical Mass (___cm)] : no neck mass was observed [Exaggerated Use Of Accessory Muscles For Inspiration] : no accessory muscle use [Abdomen Tenderness] : non-tender [Abdomen Soft] : soft [No CVA Tenderness] : no ~M costovertebral angle tenderness [No Spinal Tenderness] : no spinal tenderness [Skin Color & Pigmentation] : normal skin color and pigmentation [] : no rash [Oriented To Time, Place, And Person] : oriented to person, place, and time

## 2021-10-19 DIAGNOSIS — D13.1 BENIGN NEOPLASM OF STOMACH: ICD-10-CM

## 2021-10-19 DIAGNOSIS — R10.13 EPIGASTRIC PAIN: ICD-10-CM

## 2021-10-19 DIAGNOSIS — K29.70 GASTRITIS, UNSPECIFIED, WITHOUT BLEEDING: ICD-10-CM

## 2021-10-22 NOTE — HISTORY OF PRESENT ILLNESS
[Heartburn] : resolved heartburn [Nausea] : denies nausea [Vomiting] : denies vomiting [Diarrhea] : denies diarrhea [Constipation] : denies constipation [Yellow Skin Or Eyes (Jaundice)] : denies jaundice [Abdominal Pain] : stable abdominal pain [Abdominal Swelling] : denies abdominal swelling [Good Compliance] : good compliance with treatment [de-identified] : 50 YO F with pMHx of GERD on Omperazole 40 Qdaily, asthma, Several spine surgeries on multiple pain medication including ibuprofen and HTN presents s/p EGD for chronic epigastric pain. Patient is having chronic epigastric pain for more than 20 years, intermittent, crushing, a/s with sweating, no triggering factors, no relieving factors, no vomiting or weight loss. in 2014 she had CCY for same reason but pain is still persistent.  Last documentation said she has dysphagia to solid food but when I asked now she denies.\par \par She underwent EGD recently in Sept 2021 showed no EOE, 1cm TA s/p hot snare in incisura, HP+\par \par \par \par \par \par

## 2021-10-22 NOTE — ASSESSMENT
[FreeTextEntry1] : 52 YO F with pMHx of GERD on Omperazole 40 Qdaily, asthma, Several spine surgeries on multiple pain medication including ibuprofen and HTN presents s/p EGD for chronic epigastric pain. \par \par \par \par \par #Chronic epigastric pain radiating to chest, intermittent lasting for few minutes: R/O Esophageal spasms/RICKY/ or cardiac etiology or dyspepsia from HP\par LFTS normal except ALT 42, s/p CCY\par \par \par Rec:\par heartburn is relieved from omeprazole (even then epigastric pain is not resolved)\par get XR- esophagogram \par manometry and EGD for gastric mapping at the same time, check for eradication also for HP in 4 weeks after ABX and PPI.\par Then yearly surveillance for gastric adenoma\par Treat with triple therapy.\par Will hold off on CT abdomen as of now.\par \par # \par #Colon cancer surveillance \par - CF (2020): good prep. diverticulosis in AC. I/E hemorrhiods. small avm in TC. 7mm and 5mm TAs in cecum w/o removal\par - repeat colonoscopy in 5 years as per endoscopist who performed procedure.

## 2021-10-22 NOTE — REVIEW OF SYSTEMS
[Chest Pain] : chest pain [Abdominal Pain] : abdominal pain [Heartburn] : heartburn [Fever] : no fever [Chills] : no chills [Eyesight Problems] : no eyesight problems [Discharge From Eyes] : no purulent discharge from the eyes [Nosebleeds] : no nosebleeds [Nasal Discharge] : no nasal discharge [Heart Rate Is Slow] : the heart rate was not slow [Heart Rate Is Fast] : the heart rate was not fast [Palpitations] : no palpitations [Vomiting] : no vomiting [Constipation] : no constipation [Diarrhea] : no diarrhea [Melena] : no melena [Dizziness] : no dizziness [Fainting] : no fainting [Deepening Of The Voice] : no deepening of the voice

## 2021-10-22 NOTE — END OF VISIT
[] : Fellow [FreeTextEntry3] : 51 F history of gastric adenoma and intestinal metaplasia and HP.  We will treat HP repeat EGD for mapping.  Endorses dysphagia EOE biopsies negative.  Obtain barium esophagram.  After the esophagram will perform manometry possibly to be performed on the same day with the EGD.

## 2021-10-25 NOTE — PATIENT PROFILE ADULT. - DOES PATIENT HAVE ADVANCE DIRECTIVE
Can you please adjust the medications as requested and send in with 4 refills.  Thanks  
Pharmacy called regarding the Lantus medication. Pharmacy asking if Dr could send in 30 mL instead of 15 mL since insurance will only cover the 30 mL for Lantus. Pls advise.  
No

## 2021-10-26 ENCOUNTER — NON-APPOINTMENT (OUTPATIENT)
Age: 51
End: 2021-10-26

## 2021-10-26 ENCOUNTER — RESULT REVIEW (OUTPATIENT)
Age: 51
End: 2021-10-26

## 2021-10-26 ENCOUNTER — OUTPATIENT (OUTPATIENT)
Dept: OUTPATIENT SERVICES | Facility: HOSPITAL | Age: 51
LOS: 1 days | Discharge: HOME | End: 2021-10-26
Payer: MEDICAID

## 2021-10-26 DIAGNOSIS — Z98.890 OTHER SPECIFIED POSTPROCEDURAL STATES: Chronic | ICD-10-CM

## 2021-10-26 DIAGNOSIS — R13.10 DYSPHAGIA, UNSPECIFIED: ICD-10-CM

## 2021-10-26 PROCEDURE — 74220 X-RAY XM ESOPHAGUS 1CNTRST: CPT | Mod: 26

## 2021-10-27 ENCOUNTER — NON-APPOINTMENT (OUTPATIENT)
Age: 51
End: 2021-10-27

## 2021-10-27 RX ORDER — SENNA 8.6 MG/1
8.6 TABLET, FILM COATED ORAL
Qty: 30 | Refills: 3 | Status: COMPLETED | COMMUNITY
Start: 2018-08-31 | End: 2021-10-27

## 2021-10-27 RX ORDER — OXYCODONE AND ACETAMINOPHEN 5; 325 MG/1; MG/1
5-325 TABLET ORAL
Qty: 42 | Refills: 0 | Status: COMPLETED | COMMUNITY
Start: 2021-02-08 | End: 2021-02-15

## 2021-10-27 RX ORDER — DOCUSATE SODIUM 100 MG/1
100 CAPSULE ORAL
Qty: 1 | Refills: 4 | Status: COMPLETED | COMMUNITY
Start: 2021-02-08 | End: 2021-10-27

## 2021-10-27 RX ORDER — OXYCODONE AND ACETAMINOPHEN 5; 325 MG/1; MG/1
5-325 TABLET ORAL
Qty: 20 | Refills: 0 | Status: COMPLETED | COMMUNITY
Start: 2021-01-20 | End: 2021-01-24

## 2021-10-28 ENCOUNTER — NON-APPOINTMENT (OUTPATIENT)
Age: 51
End: 2021-10-28

## 2021-11-18 ENCOUNTER — NON-APPOINTMENT (OUTPATIENT)
Age: 51
End: 2021-11-18

## 2021-12-02 ENCOUNTER — APPOINTMENT (OUTPATIENT)
Dept: NEUROSURGERY | Facility: CLINIC | Age: 51
End: 2021-12-02
Payer: MEDICAID

## 2021-12-02 PROCEDURE — 99214 OFFICE O/P EST MOD 30 MIN: CPT

## 2021-12-02 NOTE — REASON FOR VISIT
[Follow-Up: _____] : a [unfilled] follow-up visit [Family Member] : family member Helical Rim Text: The closure involved the helical rim.

## 2021-12-02 NOTE — HISTORY OF PRESENT ILLNESS
[FreeTextEntry1] : Ms. Guevara presents for follow up s/p SCS placement. The SCS was reprogrammed today with the [employer] rep. She notes improvement of her back and leg pain with reprogramming. However, her main concern is continued pain in her feet. She was provided with new programs to see if this will cover this pain. \par

## 2021-12-02 NOTE — DATA REVIEWED
[de-identified] : \par - MRI lumbar spine VZ: lumbar spondylosis, postop changes L5/S1, foraminal stenosis L4/5. \par - MRI cervical spine VZ: post op changes C3/4 C4/5, adjacent spondylosis, no cord compression. \par Incidental prominent lymph nodes, nonspecific per report. Will refer to patients PCP for evaluation.

## 2021-12-02 NOTE — ASSESSMENT
[FreeTextEntry1] : Ms. RIOS is concerned with the persistent pain / neuropathy in her feet. She will monitor her response to the new SCS programs, however will proceed with an updated MRI lumbar spine and a new EMG of the LE's. I will see her back in 2-3 weeks. \par

## 2021-12-17 ENCOUNTER — OUTPATIENT (OUTPATIENT)
Dept: OUTPATIENT SERVICES | Facility: HOSPITAL | Age: 51
LOS: 1 days | Discharge: HOME | End: 2021-12-17

## 2021-12-17 ENCOUNTER — APPOINTMENT (OUTPATIENT)
Dept: INTERNAL MEDICINE | Facility: CLINIC | Age: 51
End: 2021-12-17
Payer: MEDICAID

## 2021-12-17 ENCOUNTER — NON-APPOINTMENT (OUTPATIENT)
Age: 51
End: 2021-12-17

## 2021-12-17 VITALS
TEMPERATURE: 98.6 F | BODY MASS INDEX: 30.43 KG/M2 | OXYGEN SATURATION: 97 % | HEIGHT: 60 IN | DIASTOLIC BLOOD PRESSURE: 69 MMHG | SYSTOLIC BLOOD PRESSURE: 118 MMHG | WEIGHT: 155 LBS | HEART RATE: 60 BPM

## 2021-12-17 DIAGNOSIS — Z98.890 OTHER SPECIFIED POSTPROCEDURAL STATES: Chronic | ICD-10-CM

## 2021-12-17 DIAGNOSIS — R41.3 OTHER AMNESIA: ICD-10-CM

## 2021-12-17 DIAGNOSIS — R11.0 NAUSEA: ICD-10-CM

## 2021-12-17 PROCEDURE — 99214 OFFICE O/P EST MOD 30 MIN: CPT | Mod: GC

## 2021-12-17 RX ORDER — OMEPRAZOLE 40 MG/1
40 CAPSULE, DELAYED RELEASE ORAL TWICE DAILY
Qty: 60 | Refills: 2 | Status: DISCONTINUED | COMMUNITY
Start: 2021-08-06 | End: 2021-12-17

## 2021-12-17 RX ORDER — OMEPRAZOLE 20 MG/1
20 TABLET, DELAYED RELEASE ORAL
Qty: 1 | Refills: 0 | Status: DISCONTINUED | COMMUNITY
Start: 2021-10-15 | End: 2021-12-17

## 2021-12-20 DIAGNOSIS — J45.909 UNSPECIFIED ASTHMA, UNCOMPLICATED: ICD-10-CM

## 2021-12-20 DIAGNOSIS — R11.0 NAUSEA: ICD-10-CM

## 2021-12-20 DIAGNOSIS — Z00.00 ENCOUNTER FOR GENERAL ADULT MEDICAL EXAMINATION WITHOUT ABNORMAL FINDINGS: ICD-10-CM

## 2021-12-20 DIAGNOSIS — K21.9 GASTRO-ESOPHAGEAL REFLUX DISEASE WITHOUT ESOPHAGITIS: ICD-10-CM

## 2021-12-20 DIAGNOSIS — G89.29 OTHER CHRONIC PAIN: ICD-10-CM

## 2021-12-22 NOTE — ASSESSMENT
[FreeTextEntry1] : Case of 51 year old female patient with pre-DM, GERD, ASTHMA, and History of fall in 2010 followed by neck, back, and shoulder pain status post multiple steroid injections and spinal surgeries and lumbar fusion in 2018 s/p neurosurgery follow up with Dr Wilson s/p SCS implant presenting for follow up\par \par \par History of fall in 2010 followed by neck, back, and shoulder pain \par * Status post multiple steroid injections and spinal surgeries and lumbar fusion in 2018 \par * s/p neurosurgery follow up with Dr Wilson \par * s/p SCS implant with some improvement, reprogrammed on 12/2/21 \par - Continue tizanidine 4mg BID, Gabapentin 800mg TID, Naproxen 500mg BID PRN, Acetaminophen 500mg BID PRN (off oxycodone since her insurance no longer covers the pain management team she used to follow up with)\par - Follow up with pain management\par - referral for PT\par \par Pre DM \par * Last hba1c 5.7 on 7/8/2021\par * Not on meds\par - Continue life style modifications, weight loss\par - Will order repeat labs for next visit\par \par GERD.\par * Home med Omerpazole 40mg QD not working\par - Pantoprazole 40mg BID started\par \par H. Pylori\par - Diagnosed but not treated\par - Abx resent to pharmacy\par - Endoscopy with GI 12/30/21\par \par Asthma \par * Controlled.\par * Home meds Albuterol, Montelukast, Diphenhydramine\par - Continue Albuterol, Montelukast, Diphenhydramine\par - Keep off smoking\par \par HCM\par - Last colonoscopy 03/2020: 7mm cecal and 3mm cecal polyps (tubular adenomas)-> repeat in 3-5 years from 2020\par - Last mammography Jul 2021- BIRADS 2- Next Mammo July 2022\par - Last PAP smear July 2021\par - Refusing flu shot\par - Recieved COVID vaccine Pfizer 7/6/21, 7/21/21

## 2021-12-22 NOTE — HISTORY OF PRESENT ILLNESS
[FreeTextEntry1] : Follow Up [de-identified] : 51 year old female patient with PMH prediabetes, GERD, asthma, fall with chronic pain presenting for follow up. \par \par States that she feels pain in her back, neck, shoulders and feet. States that the pain management doctor does not give medications but is the only one she has found that takes her insurance. Pain medications given previously do not work. States pain is sharp, burning pain that starts in her neck and radiates to shoulders and hands being feeling tingly and numb. Cannot hold things because they fall out of her hands.\par Patient also states that she has been experiencing problems with her memory, feels that she has to write everything down or will forget. States that began a while ago but worse after her bout with COVID this year. She also states that she feels the need to take her omeprazole 2-3 times a day, has been experiencing increased heartburn and will run out of this medication as a result.\par \par - History of fall in 2010 followed by neck, back, and shoulder pain status post multiple steroid injections and spinal surgeries and lumbar fusion in 2018 s/p neurosurgery follow up with Dr Wilson s/p SCS implant with some improvement. Recently followed up on 06/09/21 s/p referral to new pain maganement Dr Diana s/p recommendation for MR FELIPE. Home meds Robaxin 750mg, Gabapentin 800mg TID, Naproxen 500mg BID PRN, Acetaminophen 500mg BID PRN (off oxycodone since her insurance no longer covers the pain management team she used to follow up with)\par

## 2021-12-22 NOTE — REVIEW OF SYSTEMS
[Abdominal Pain] : abdominal pain [Constipation] : constipation [Joint Pain] : joint pain [Back Pain] : back pain [Headache] : headache [Dizziness] : dizziness [Memory Loss] : memory loss [Negative] : Integumentary [Nausea] : no nausea [Diarrhea] : diarrhea [Vomiting] : no vomiting [de-identified] : headache and diziness when neck hurts

## 2021-12-27 ENCOUNTER — LABORATORY RESULT (OUTPATIENT)
Age: 51
End: 2021-12-27

## 2022-01-07 ENCOUNTER — NON-APPOINTMENT (OUTPATIENT)
Age: 52
End: 2022-01-07

## 2022-01-07 ENCOUNTER — LABORATORY RESULT (OUTPATIENT)
Age: 52
End: 2022-01-07

## 2022-01-11 ENCOUNTER — NON-APPOINTMENT (OUTPATIENT)
Age: 52
End: 2022-01-11

## 2022-01-14 ENCOUNTER — APPOINTMENT (OUTPATIENT)
Dept: GASTROENTEROLOGY | Facility: CLINIC | Age: 52
End: 2022-01-14
Payer: MEDICAID

## 2022-01-14 ENCOUNTER — OUTPATIENT (OUTPATIENT)
Dept: OUTPATIENT SERVICES | Facility: HOSPITAL | Age: 52
LOS: 1 days | Discharge: HOME | End: 2022-01-14

## 2022-01-14 ENCOUNTER — NON-APPOINTMENT (OUTPATIENT)
Age: 52
End: 2022-01-14

## 2022-01-14 DIAGNOSIS — Z98.890 OTHER SPECIFIED POSTPROCEDURAL STATES: Chronic | ICD-10-CM

## 2022-01-14 DIAGNOSIS — R10.9 UNSPECIFIED ABDOMINAL PAIN: ICD-10-CM

## 2022-01-14 DIAGNOSIS — K29.70 GASTRITIS, UNSPECIFIED, W/OUT BLEEDING: ICD-10-CM

## 2022-01-14 DIAGNOSIS — K21.9 GASTRO-ESOPHAGEAL REFLUX DISEASE WITHOUT ESOPHAGITIS: ICD-10-CM

## 2022-01-14 DIAGNOSIS — B96.81 GASTRITIS, UNSPECIFIED, W/OUT BLEEDING: ICD-10-CM

## 2022-01-14 PROCEDURE — 99213 OFFICE O/P EST LOW 20 MIN: CPT | Mod: GC,95

## 2022-01-21 ENCOUNTER — LABORATORY RESULT (OUTPATIENT)
Age: 52
End: 2022-01-21

## 2022-01-21 NOTE — ASSESSMENT
[FreeTextEntry1] : 53 y/o Fm w/ PMhx of GERD on protonix 40 big, Asthma, multiple spinal surgeries is being followed up by GI for epigsatric pain and solid food dysphagia. Pt had EGD done in 9/21 which showed H. Pylori gastritis s/p treatment with unconfirmed eradication, and 1cm TA w/ hot snare polypectomy in incisura. Pt's dysphagia symptoms have resolved. however pt endorses sharp intermittent epigastric pain that is not related to food and is spasmodic in nature without any particular triggers. denies any N/V/diarrhe fever or chills. and occasionally has nocturnal symptoms as wel\par \par #acute on chronic epigastric pain s/p EGD in 9/21\par #H. Pylori gastritis s/p treatment\par #1cm TA in incisura s/p hot snare polypectomy\par #Solid food dysphagia - resolved\par - LFTS normal except ALT 42, s/p CCY\par - EGD (9/21): hpylori gastritis. 1cm TA w/ hot snare polypectomy in incisura w/ intestinal metaplasia. \par - Xray esophagogram - wnl\par \par Rec:\par - c/w protonix 40mg bid\par - will schedule for EGD for gastric mapping and for confirmation of eradication of H. Pylori +/- chromoendoscopy\par - Then yearly surveillance for gastric adenoma\par - will get RUQ sono\par \par \par #Colon cancer surveillance \par - CF (2020): good prep. diverticulosis in AC. I/E hemorrhiods. small avm in TC. 7mm and 5mm TAs in cecum w/o removal\par - repeat colonoscopy in 5 years as per endoscopist who performed procedure. \par \par

## 2022-01-21 NOTE — HISTORY OF PRESENT ILLNESS
[Home] : at home, [unfilled] , at the time of the visit. [Medical Office: ([facility])___] : at the medical office located in  [de-identified] : 51 y/o Fm w/ PMhx of GERD on protonix 40 big, Asthma, multiple spinal surgeries is being followed up by GI for epigsatric pain and solid food dysphagia. Pt had EGD done in 9/21 which showed H. Pylori gastritis s/p treatment with unconfirmed eradication, and 1cm TA w/ hot snare polypectomy in incisura. Pt's dysphagia symptoms have resolved. however pt endorses sharp intermittent epigastric pain that is not related to food and is spasmodic in nature without any particular triggers. denies any N/V/diarrhe fever or chills. and occasionally has nocturnal symptoms as wel detailed exam

## 2022-01-21 NOTE — END OF VISIT
[] : Fellow [FreeTextEntry3] : 53 yo F gastric adenoma, Dysphagia, HP and epigastric pain. Will repeat EGD to surveil the adenoma site, biopsy the esophagus (dysphagia) with possible chromo and resection if deemed necessary. treat HP and confirm eradication

## 2022-01-24 ENCOUNTER — OUTPATIENT (OUTPATIENT)
Dept: OUTPATIENT SERVICES | Facility: HOSPITAL | Age: 52
LOS: 1 days | Discharge: HOME | End: 2022-01-24
Payer: MEDICAID

## 2022-01-24 ENCOUNTER — TRANSCRIPTION ENCOUNTER (OUTPATIENT)
Age: 52
End: 2022-01-24

## 2022-01-24 ENCOUNTER — RESULT REVIEW (OUTPATIENT)
Age: 52
End: 2022-01-24

## 2022-01-24 VITALS
DIASTOLIC BLOOD PRESSURE: 59 MMHG | SYSTOLIC BLOOD PRESSURE: 119 MMHG | OXYGEN SATURATION: 99 % | RESPIRATION RATE: 18 BRPM | HEART RATE: 75 BPM

## 2022-01-24 VITALS
HEART RATE: 61 BPM | TEMPERATURE: 97 F | SYSTOLIC BLOOD PRESSURE: 145 MMHG | DIASTOLIC BLOOD PRESSURE: 97 MMHG | WEIGHT: 154.1 LBS | RESPIRATION RATE: 18 BRPM

## 2022-01-24 DIAGNOSIS — Z98.890 OTHER SPECIFIED POSTPROCEDURAL STATES: Chronic | ICD-10-CM

## 2022-01-24 PROCEDURE — 43239 EGD BIOPSY SINGLE/MULTIPLE: CPT

## 2022-01-24 PROCEDURE — 88313 SPECIAL STAINS GROUP 2: CPT | Mod: 26

## 2022-01-24 PROCEDURE — 88305 TISSUE EXAM BY PATHOLOGIST: CPT | Mod: 26

## 2022-01-24 NOTE — ASU PATIENT PROFILE, ADULT - FALL HARM RISK - UNIVERSAL INTERVENTIONS
Bed in lowest position, wheels locked, appropriate side rails in place/Call bell, personal items and telephone in reach/Instruct patient to call for assistance before getting out of bed or chair/Non-slip footwear when patient is out of bed/[place] to call system/Physically safe environment - no spills, clutter or unnecessary equipment/Purposeful Proactive Rounding/Room/bathroom lighting operational, light cord in reach

## 2022-01-24 NOTE — ASU DISCHARGE PLAN (ADULT/PEDIATRIC) - NS MD DC FALL RISK RISK
For information on Fall & Injury Prevention, visit: [email or web address] OR  [email or web address] OR  https://www.cdc.gov/steadi/patient.html

## 2022-01-24 NOTE — CHART NOTE - NSCHARTNOTEFT_GEN_A_CORE
PACU ANESTHESIA ADMISSION NOTE      Procedure: EGD  Post op diagnosis:  polyps    ____  Intubated  TV:______       Rate: ______      FiO2: ______    ___X_  Patent Airway    ___x_  Full return of protective reflexes    __x__  Full recovery from anesthesia / back to baseline     Vitals:   T: 98F          R: 15                 BP: 101/58                 Sat: 99%                  P: 75      Mental Status:  __X__ Awake   __X___ Alert   _____ Drowsy   _____ Sedated    Nausea/Vomiting:  _X___ NO  ______Yes,   See Post - Op Orders          Pain Scale (0-10):  __0___    Treatment: __X__ None    ____ See Post - Op/PCA Orders    Post - Operative Fluids:   ____ Oral   ___X_ See Post - Op Orders    Plan: Discharge:   __X__Home       _____Floor     _____Critical Care    _____  Other:_________________    Comments: Patient dropped off to PACU. VSS. Airway patent. Pt awake and responsive. Report to PACU RN at bedside. No anesthesia complications.

## 2022-01-25 ENCOUNTER — TRANSCRIPTION ENCOUNTER (OUTPATIENT)
Age: 52
End: 2022-01-25

## 2022-01-25 ENCOUNTER — OUTPATIENT (OUTPATIENT)
Dept: OUTPATIENT SERVICES | Facility: HOSPITAL | Age: 52
LOS: 1 days | Discharge: HOME | End: 2022-01-25
Payer: MEDICAID

## 2022-01-25 VITALS
RESPIRATION RATE: 18 BRPM | HEART RATE: 65 BPM | TEMPERATURE: 98 F | DIASTOLIC BLOOD PRESSURE: 69 MMHG | HEIGHT: 60 IN | WEIGHT: 154.1 LBS | SYSTOLIC BLOOD PRESSURE: 132 MMHG

## 2022-01-25 VITALS
SYSTOLIC BLOOD PRESSURE: 131 MMHG | RESPIRATION RATE: 18 BRPM | DIASTOLIC BLOOD PRESSURE: 73 MMHG | OXYGEN SATURATION: 99 % | TEMPERATURE: 98 F | HEART RATE: 59 BPM

## 2022-01-25 DIAGNOSIS — Z98.890 OTHER SPECIFIED POSTPROCEDURAL STATES: Chronic | ICD-10-CM

## 2022-01-25 PROCEDURE — 91037 ESOPH IMPED FUNCTION TEST: CPT | Mod: 26

## 2022-01-25 PROCEDURE — 91010 ESOPHAGUS MOTILITY STUDY: CPT | Mod: 26

## 2022-01-25 NOTE — ASU PATIENT PROFILE, ADULT - FALL HARM RISK - HARM RISK INTERVENTIONS
Assistance with ambulation/Communicate Risk of Fall with Harm to all staff/Monitor gait and stability/Reinforce activity limits and safety measures with patient and family/Tailored Fall Risk Interventions/Visual Cue: Yellow wristband and red socks/Bed in lowest position, wheels locked, appropriate side rails in place/Call bell, personal items and telephone in reach/Instruct patient to call for assistance before getting out of bed or chair/Non-slip footwear when patient is out of bed/[place] to call system/Physically safe environment - no spills, clutter or unnecessary equipment/Purposeful Proactive Rounding/Room/bathroom lighting operational, light cord in reach

## 2022-01-25 NOTE — ASU DISCHARGE PLAN (ADULT/PEDIATRIC) - NS MD DC FALL RISK RISK
For information on Fall & Injury Prevention, visit: [email or web address] OR  [email or web address] OR  https://www.cdc.gov/steadi/patient.html

## 2022-01-26 LAB
25(OH)D3 SERPL-MCNC: 32 NG/ML
ALBUMIN SERPL ELPH-MCNC: 4.4 G/DL
ALP BLD-CCNC: 131 U/L
ALT SERPL-CCNC: 20 U/L
ANION GAP SERPL CALC-SCNC: 12 MMOL/L
AST SERPL-CCNC: 10 U/L
BASOPHILS # BLD AUTO: 0.01 K/UL
BASOPHILS NFR BLD AUTO: 0.2 %
BILIRUB SERPL-MCNC: 0.3 MG/DL
BUN SERPL-MCNC: 10 MG/DL
CALCIUM SERPL-MCNC: 9.5 MG/DL
CHLORIDE SERPL-SCNC: 106 MMOL/L
CHOLEST SERPL-MCNC: 158 MG/DL
CO2 SERPL-SCNC: 23 MMOL/L
CREAT SERPL-MCNC: 0.6 MG/DL
CREAT SPEC-SCNC: 118 MG/DL
EOSINOPHIL # BLD AUTO: 0.04 K/UL
EOSINOPHIL NFR BLD AUTO: 0.8 %
ESTIMATED AVERAGE GLUCOSE: 131 MG/DL
GLUCOSE SERPL-MCNC: 108 MG/DL
HBA1C MFR BLD HPLC: 6.2 %
HCT VFR BLD CALC: 37.2 %
HDLC SERPL-MCNC: 45 MG/DL
HGB BLD-MCNC: 12.2 G/DL
IMM GRANULOCYTES NFR BLD AUTO: 0.2 %
LDLC SERPL CALC-MCNC: 108 MG/DL
LYMPHOCYTES # BLD AUTO: 1.93 K/UL
LYMPHOCYTES NFR BLD AUTO: 40.6 %
MAN DIFF?: NORMAL
MCHC RBC-ENTMCNC: 29.7 PG
MCHC RBC-ENTMCNC: 32.8 G/DL
MCV RBC AUTO: 90.5 FL
MICROALBUMIN 24H UR DL<=1MG/L-MCNC: <1.2 MG/DL
MICROALBUMIN/CREAT 24H UR-RTO: NORMAL MG/G
MONOCYTES # BLD AUTO: 0.33 K/UL
MONOCYTES NFR BLD AUTO: 6.9 %
NEUTROPHILS # BLD AUTO: 2.43 K/UL
NEUTROPHILS NFR BLD AUTO: 51.3 %
NONHDLC SERPL-MCNC: 113 MG/DL
PLATELET # BLD AUTO: 187 K/UL
POTASSIUM SERPL-SCNC: 4.8 MMOL/L
PROT SERPL-MCNC: 7.5 G/DL
RBC # BLD: 4.11 M/UL
RBC # FLD: 12.9 %
SODIUM SERPL-SCNC: 141 MMOL/L
TRIGL SERPL-MCNC: 89 MG/DL
TSH SERPL-ACNC: 0.92 UIU/ML
WBC # FLD AUTO: 4.75 K/UL

## 2022-01-28 DIAGNOSIS — R10.13 EPIGASTRIC PAIN: ICD-10-CM

## 2022-01-28 LAB — SURGICAL PATHOLOGY STUDY: SIGNIFICANT CHANGE UP

## 2022-01-31 DIAGNOSIS — Z87.19 PERSONAL HISTORY OF OTHER DISEASES OF THE DIGESTIVE SYSTEM: ICD-10-CM

## 2022-01-31 DIAGNOSIS — K29.50 UNSPECIFIED CHRONIC GASTRITIS WITHOUT BLEEDING: ICD-10-CM

## 2022-01-31 DIAGNOSIS — K31.89 OTHER DISEASES OF STOMACH AND DUODENUM: ICD-10-CM

## 2022-02-10 ENCOUNTER — APPOINTMENT (OUTPATIENT)
Dept: INTERNAL MEDICINE | Facility: CLINIC | Age: 52
End: 2022-02-10

## 2022-03-08 ENCOUNTER — RESULT REVIEW (OUTPATIENT)
Age: 52
End: 2022-03-08

## 2022-03-08 ENCOUNTER — OUTPATIENT (OUTPATIENT)
Dept: OUTPATIENT SERVICES | Facility: HOSPITAL | Age: 52
LOS: 1 days | Discharge: HOME | End: 2022-03-08
Payer: MEDICAID

## 2022-03-08 DIAGNOSIS — Z98.890 OTHER SPECIFIED POSTPROCEDURAL STATES: Chronic | ICD-10-CM

## 2022-03-08 DIAGNOSIS — M47.9 SPONDYLOSIS, UNSPECIFIED: ICD-10-CM

## 2022-03-08 PROCEDURE — 72148 MRI LUMBAR SPINE W/O DYE: CPT | Mod: 26

## 2022-03-10 ENCOUNTER — APPOINTMENT (OUTPATIENT)
Dept: INTERNAL MEDICINE | Facility: CLINIC | Age: 52
End: 2022-03-10
Payer: MEDICAID

## 2022-03-10 ENCOUNTER — OUTPATIENT (OUTPATIENT)
Dept: OUTPATIENT SERVICES | Facility: HOSPITAL | Age: 52
LOS: 1 days | Discharge: HOME | End: 2022-03-10

## 2022-03-10 VITALS
HEART RATE: 70 BPM | DIASTOLIC BLOOD PRESSURE: 78 MMHG | TEMPERATURE: 98.7 F | HEIGHT: 60 IN | SYSTOLIC BLOOD PRESSURE: 121 MMHG | OXYGEN SATURATION: 97 % | WEIGHT: 155 LBS | BODY MASS INDEX: 30.43 KG/M2

## 2022-03-10 DIAGNOSIS — K21.9 GASTRO-ESOPHAGEAL REFLUX DISEASE WITHOUT ESOPHAGITIS: ICD-10-CM

## 2022-03-10 DIAGNOSIS — Z98.890 OTHER SPECIFIED POSTPROCEDURAL STATES: Chronic | ICD-10-CM

## 2022-03-10 DIAGNOSIS — J45.909 UNSPECIFIED ASTHMA, UNCOMPLICATED: ICD-10-CM

## 2022-03-10 LAB
ALBUMIN SERPL ELPH-MCNC: 4.6 G/DL
ALP BLD-CCNC: 117 U/L
ALT SERPL-CCNC: 19 U/L
AST SERPL-CCNC: 12 U/L
BILIRUB DIRECT SERPL-MCNC: <0.2 MG/DL
BILIRUB INDIRECT SERPL-MCNC: >0.1 MG/DL
BILIRUB SERPL-MCNC: 0.3 MG/DL
PROT SERPL-MCNC: 7.9 G/DL

## 2022-03-10 PROCEDURE — 99214 OFFICE O/P EST MOD 30 MIN: CPT | Mod: GC

## 2022-03-10 RX ORDER — METHOCARBAMOL 750 MG/1
750 TABLET, FILM COATED ORAL EVERY 8 HOURS
Qty: 21 | Refills: 0 | Status: DISCONTINUED | COMMUNITY
Start: 2021-02-08 | End: 2022-03-10

## 2022-03-21 DIAGNOSIS — M54.9 DORSALGIA, UNSPECIFIED: ICD-10-CM

## 2022-03-22 NOTE — HISTORY OF PRESENT ILLNESS
[FreeTextEntry1] : follow up  for back pain [de-identified] : 52 year old female patient with PMH prediabetes, GERD, asthma, fall with chronic back pain presenting for follow up. The patient reports she was prescribed dronabinol however was told from the pharmacy that the medication is not approved by insurance as it is not approved for pain but only for appetite stimulation and n/v. Pharmacy spoke to patient on 1/11/22 and informed the patient. Neurosx for reprogramming and evaluation was planned at the end of December but patient did not follow up. She notes upper and lower back pain which is not well controlled. Also notes heartburn.  all other ros negative \par

## 2022-03-22 NOTE — PLAN
[FreeTextEntry1] : \par 52 year old female patient with PMH prediabetes, GERD, asthma, fall with chronic back pain presenting for follow up. The patient reports she was prescribed dronabinol however was told from the pharmacy that the medication is not approved by insurance as it is not approved for pain but only for appetite stimulation and n/v. Pharmacy spoke to patient on 1/11/22 and informed the patient. Neurosx for reprogramming and evaluation was planned at the end of December but patient did not follow up. She notes upper and lower back pain which is not well controlled. Also notes heartburn.  all other ros negative \par \par \par # chronic back pain\par - dronabinol not covered\par - reviewed imaging again\par - patient to follow up with neurosx for reprogramming of SCS with updated MRI results.\par - c/w gabapentin, diclofenac gel \par - change robaxin to baclofen\par - start lidocaine patch\par - f/u neurosx \par \par \par # Asthma\par -stable continue Tx\par # GERD \par - start ppi qd\par - no weight loss or alarm symptoms\par \par RTC in 3 months \par \par

## 2022-04-13 ENCOUNTER — APPOINTMENT (OUTPATIENT)
Dept: NEUROSURGERY | Facility: CLINIC | Age: 52
End: 2022-04-13
Payer: MEDICAID

## 2022-04-13 DIAGNOSIS — M25.512 PAIN IN LEFT SHOULDER: ICD-10-CM

## 2022-04-13 PROCEDURE — 99213 OFFICE O/P EST LOW 20 MIN: CPT

## 2022-04-13 NOTE — PHYSICAL EXAM
[FreeTextEntry1] : Constitutional: Well appearing, no distress\par HEENT: Normocephalic Atraumatic\par Psychiatric: Alert and oriented to all spheres, normal mood\par Pulmonary: no respiratory distress\par Abdomen: non-distended\par Vascular/Extremities: no edema, no cyanosis, no clubbing\par \par \par Neurologic: \par CN II-XII grossly intact\par ROM: decreased in C spine, decreased ROM due to pain especially on external rotation at left shoulder\par Palpation: pain to palpation cervicla musculature\par Strength: Full strength in all major muscle groups, no atrophy\par Sensation: Full sensation to light touch in all extremities\par Reflexes: \par               2+ patellar\par               2+ biceps\par               2+ ankle jerk\par              No Mari's\par              No clonus\par              No babinski\par \par Signs:\par SLR negative\par L'hermitte's negative\par \par Gait: antalgic but improved, using cane, no longer with wheelchair\par \par \par \par \par

## 2022-04-13 NOTE — HISTORY OF PRESENT ILLNESS
[FreeTextEntry1] : I am seeing Ms. Guevara in f/u overall she has significant relief of both back and leg pain with her [employer] SCS. She is having some issues with the remote and we will have her communicate directly regarding this with her CST. \par \par Recently she is experiencing pain in her neck radiating to bilateral shoulders. She reports dropping things. no radiation of pain in to the hands. She does report some nubness in the fingers. She has pain at the left shoulder.

## 2022-04-22 ENCOUNTER — APPOINTMENT (OUTPATIENT)
Dept: ORTHOPEDIC SURGERY | Facility: CLINIC | Age: 52
End: 2022-04-22
Payer: MEDICAID

## 2022-04-22 VITALS
TEMPERATURE: 97.9 F | WEIGHT: 156 LBS | HEART RATE: 68 BPM | OXYGEN SATURATION: 98 % | DIASTOLIC BLOOD PRESSURE: 80 MMHG | HEIGHT: 60 IN | SYSTOLIC BLOOD PRESSURE: 138 MMHG | BODY MASS INDEX: 30.63 KG/M2

## 2022-04-22 DIAGNOSIS — M75.02 ADHESIVE CAPSULITIS OF LEFT SHOULDER: ICD-10-CM

## 2022-04-22 DIAGNOSIS — R20.2 PARESTHESIA OF SKIN: ICD-10-CM

## 2022-04-22 PROCEDURE — 72050 X-RAY EXAM NECK SPINE 4/5VWS: CPT

## 2022-04-22 PROCEDURE — 73030 X-RAY EXAM OF SHOULDER: CPT | Mod: LT

## 2022-04-22 PROCEDURE — 99203 OFFICE O/P NEW LOW 30 MIN: CPT

## 2022-04-27 ENCOUNTER — NON-APPOINTMENT (OUTPATIENT)
Age: 52
End: 2022-04-27

## 2022-05-12 ENCOUNTER — NON-APPOINTMENT (OUTPATIENT)
Age: 52
End: 2022-05-12

## 2022-05-12 ENCOUNTER — APPOINTMENT (OUTPATIENT)
Dept: INTERNAL MEDICINE | Facility: CLINIC | Age: 52
End: 2022-05-12
Payer: MEDICAID

## 2022-05-12 ENCOUNTER — OUTPATIENT (OUTPATIENT)
Dept: OUTPATIENT SERVICES | Facility: HOSPITAL | Age: 52
LOS: 1 days | Discharge: HOME | End: 2022-05-12

## 2022-05-12 VITALS
BODY MASS INDEX: 30.63 KG/M2 | HEART RATE: 67 BPM | SYSTOLIC BLOOD PRESSURE: 136 MMHG | OXYGEN SATURATION: 98 % | DIASTOLIC BLOOD PRESSURE: 91 MMHG | WEIGHT: 156 LBS | HEIGHT: 60 IN | TEMPERATURE: 97.1 F

## 2022-05-12 DIAGNOSIS — J45.909 UNSPECIFIED ASTHMA, UNCOMPLICATED: ICD-10-CM

## 2022-05-12 DIAGNOSIS — R73.03 PREDIABETES: ICD-10-CM

## 2022-05-12 DIAGNOSIS — Z98.890 OTHER SPECIFIED POSTPROCEDURAL STATES: Chronic | ICD-10-CM

## 2022-05-12 DIAGNOSIS — M54.9 DORSALGIA, UNSPECIFIED: ICD-10-CM

## 2022-05-12 PROCEDURE — 99214 OFFICE O/P EST MOD 30 MIN: CPT

## 2022-05-12 RX ORDER — BACLOFEN 10 MG/1
10 TABLET ORAL TWICE DAILY
Qty: 60 | Refills: 1 | Status: DISCONTINUED | COMMUNITY
Start: 2022-03-10 | End: 2022-05-12

## 2022-05-12 RX ORDER — OMEPRAZOLE 40 MG/1
40 CAPSULE, DELAYED RELEASE ORAL DAILY
Qty: 30 | Refills: 3 | Status: DISCONTINUED | COMMUNITY
Start: 2019-11-05 | End: 2022-05-12

## 2022-05-12 RX ORDER — CLARITHROMYCIN 500 MG/1
500 TABLET, FILM COATED ORAL
Qty: 28 | Refills: 0 | Status: DISCONTINUED | COMMUNITY
Start: 2021-10-15 | End: 2022-05-12

## 2022-05-12 RX ORDER — AMOXICILLIN 500 MG/1
500 CAPSULE ORAL TWICE DAILY
Qty: 108 | Refills: 0 | Status: DISCONTINUED | COMMUNITY
Start: 2021-10-15 | End: 2022-05-12

## 2022-05-26 NOTE — COUNSELING
[Fall prevention counseling provided] : Fall prevention counseling provided [No throw rugs] : No throw rugs [Use proper foot wear] : Use proper foot wear [Potential consequences of obesity discussed] : Potential consequences of obesity discussed

## 2022-05-26 NOTE — ASSESSMENT
[FreeTextEntry1] : \par 52 year old female patient with pre-DM, GERD, ASTHMA, and History of fall in 2010 s/p spinal surgeries and lumbar fusion in 2018 s/p neuro\par \par # Chronic back pain\par - Status post multiple steroid injections \par - s/p SCS implant with some improvement, reprogrammed on 12/2/21 \par - Continue all current meds tizanidine 4mg BID, Gabapentin 800mg TID, Naproxen 500mg BID PRN, Acetaminophen 500mg BID PRN\par - Suggest also using lidocaine patches\par \par - Follow up with pain management\par - referral for PT\par \par Pre DM \par - Last hba1c 6.2 in Jan 2022\par - suggest repeat labs, not done for this visit\par - continue diet control\par \par \par Asthma \par * Controlled.\par * Home meds Albuterol, Montelukast, Diphenhydramine\par - Continue Albuterol, Montelukast, Diphenhydramine\par - Keep off smoking\par \par HCM\par - Last colonoscopy 03/2020, 2 polyps (tubular adenomas)-> repeat in 2023\par - Next Mammo July 2022\par - Last PAP smear July 2021\par - Recieved COVID vaccine Pfizer 7/6/21, 7/21/21. x 2 only\par

## 2022-05-26 NOTE — PHYSICAL EXAM
[No Acute Distress] : no acute distress [Well Nourished] : well nourished [Well Developed] : well developed [Well-Appearing] : well-appearing [Normal Sclera/Conjunctiva] : normal sclera/conjunctiva [PERRL] : pupils equal round and reactive to light [EOMI] : extraocular movements intact [Normal Outer Ear/Nose] : the outer ears and nose were normal in appearance [Normal Oropharynx] : the oropharynx was normal [No JVD] : no jugular venous distention [No Lymphadenopathy] : no lymphadenopathy [Supple] : supple [Thyroid Normal, No Nodules] : the thyroid was normal and there were no nodules present [No Respiratory Distress] : no respiratory distress  [No Accessory Muscle Use] : no accessory muscle use [Clear to Auscultation] : lungs were clear to auscultation bilaterally [Normal Rate] : normal rate  [Regular Rhythm] : with a regular rhythm [Normal S1, S2] : normal S1 and S2 [No Murmur] : no murmur heard [No Carotid Bruits] : no carotid bruits [No Abdominal Bruit] : a ~M bruit was not heard ~T in the abdomen [No Varicosities] : no varicosities [Pedal Pulses Present] : the pedal pulses are present [No Edema] : there was no peripheral edema [No Palpable Aorta] : no palpable aorta [No Extremity Clubbing/Cyanosis] : no extremity clubbing/cyanosis [Soft] : abdomen soft [Non Tender] : non-tender [Non-distended] : non-distended [No Masses] : no abdominal mass palpated [No HSM] : no HSM [Normal Bowel Sounds] : normal bowel sounds [Normal Posterior Cervical Nodes] : no posterior cervical lymphadenopathy [Normal Anterior Cervical Nodes] : no anterior cervical lymphadenopathy [No CVA Tenderness] : no CVA  tenderness [Normal] : no joint swelling and grossly normal strength and tone [No Rash] : no rash [Coordination Grossly Intact] : coordination grossly intact [No Focal Deficits] : no focal deficits [Normal Gait] : normal gait [Deep Tendon Reflexes (DTR)] : deep tendon reflexes were 2+ and symmetric [Normal Affect] : the affect was normal [Normal Insight/Judgement] : insight and judgment were intact [de-identified] : surgical scare present, muscle spasm and tender

## 2022-05-26 NOTE — HISTORY OF PRESENT ILLNESS
[FreeTextEntry1] : -persistent back pain\par - pigmented skin lesions [de-identified] : 52 year old female patient with pre-DM, GERD, ASTHMA, and History of fall in 2010 followed by neck, back, and shoulder pain status post multiple steroid injections and spinal surgeries and lumbar fusion in 2018 s/p neurosurgery follow up with Dr Wilson s/p SCS implant presenting for follow up. Still c/o back pain

## 2022-06-10 ENCOUNTER — NON-APPOINTMENT (OUTPATIENT)
Age: 52
End: 2022-06-10

## 2022-06-10 NOTE — ASU PATIENT PROFILE, ADULT - HARM RISK FACTORS
Omental infarction is a rare cause of acute abdomen pain with reported incidence being less than 4 per 1000 cases of appendicitis. Omental infarction usually presents as right-sided abdominal pain although seldom causing left-sided abdominal pain and even epigastric pain. The dominion of right-sided abdominal pain in omental infarction has been attributed to right segmental infarction as a result of the tenuous blood vessels in this part of the omentum as well as its longer size and higher mobility in comparison to the left side which subjects it to torsion.     Historically, omental infarction was diagnosed only intraoperatively during surgery for presumed appendicitis or other causes of acute abdomen. But with the increase in the use of imaging, especially abdominal computed tomography (CT) scan in the work-up for acute abdomen, more cases of omental infarction are being diagnosed preoperatively. This has also led to the observation that omental infarction is a self-limiting condition which can be managed conservatively. Currently, conservative management and surgery are the only treatment options for omental infarction with no consensus as to the best treatment modality.    Usually this condition does not require surgery. You should follow-up with your primary physician and a general surgeon for an evaluation. If you experience worsening discomfort that in unbearable at home, inability to keep down food/liquids, bloody stools, fever/chills, or other new concerning symptoms please return to the ER for an evaluation.  
no

## 2022-06-14 ENCOUNTER — NON-APPOINTMENT (OUTPATIENT)
Age: 52
End: 2022-06-14

## 2022-06-15 ENCOUNTER — NON-APPOINTMENT (OUTPATIENT)
Age: 52
End: 2022-06-15

## 2022-07-01 ENCOUNTER — NON-APPOINTMENT (OUTPATIENT)
Age: 52
End: 2022-07-01

## 2022-07-06 ENCOUNTER — LABORATORY RESULT (OUTPATIENT)
Age: 52
End: 2022-07-06

## 2022-07-06 ENCOUNTER — NON-APPOINTMENT (OUTPATIENT)
Age: 52
End: 2022-07-06

## 2022-07-07 ENCOUNTER — NON-APPOINTMENT (OUTPATIENT)
Age: 52
End: 2022-07-07

## 2022-07-14 ENCOUNTER — APPOINTMENT (OUTPATIENT)
Dept: INTERNAL MEDICINE | Facility: CLINIC | Age: 52
End: 2022-07-14

## 2022-07-14 ENCOUNTER — OUTPATIENT (OUTPATIENT)
Dept: OUTPATIENT SERVICES | Facility: HOSPITAL | Age: 52
LOS: 1 days | Discharge: HOME | End: 2022-07-14

## 2022-07-14 ENCOUNTER — NON-APPOINTMENT (OUTPATIENT)
Age: 52
End: 2022-07-14

## 2022-07-14 VITALS
SYSTOLIC BLOOD PRESSURE: 132 MMHG | DIASTOLIC BLOOD PRESSURE: 74 MMHG | OXYGEN SATURATION: 100 % | HEIGHT: 60 IN | WEIGHT: 152 LBS | TEMPERATURE: 98 F | BODY MASS INDEX: 29.84 KG/M2 | HEART RATE: 68 BPM

## 2022-07-14 DIAGNOSIS — R20.2 PARESTHESIA OF SKIN: ICD-10-CM

## 2022-07-14 DIAGNOSIS — Z98.890 OTHER SPECIFIED POSTPROCEDURAL STATES: Chronic | ICD-10-CM

## 2022-07-14 DIAGNOSIS — R10.13 EPIGASTRIC PAIN: ICD-10-CM

## 2022-07-14 DIAGNOSIS — G89.29 EPIGASTRIC PAIN: ICD-10-CM

## 2022-07-14 PROCEDURE — 99214 OFFICE O/P EST MOD 30 MIN: CPT | Mod: GC

## 2022-07-14 RX ORDER — PANTOPRAZOLE 40 MG/1
40 TABLET, DELAYED RELEASE ORAL DAILY
Qty: 30 | Refills: 3 | Status: DISCONTINUED | COMMUNITY
Start: 2022-03-10 | End: 2022-07-14

## 2022-07-14 RX ORDER — OXYCODONE AND ACETAMINOPHEN TABLETS 5; 300 MG/1; MG/1
5-300 TABLET ORAL TWICE DAILY
Qty: 40 | Refills: 0 | Status: DISCONTINUED | COMMUNITY
Start: 2022-07-14 | End: 2022-07-14

## 2022-07-15 ENCOUNTER — OUTPATIENT (OUTPATIENT)
Dept: OUTPATIENT SERVICES | Facility: HOSPITAL | Age: 52
LOS: 1 days | Discharge: HOME | End: 2022-07-15

## 2022-07-15 ENCOUNTER — RESULT REVIEW (OUTPATIENT)
Age: 52
End: 2022-07-15

## 2022-07-15 ENCOUNTER — NON-APPOINTMENT (OUTPATIENT)
Age: 52
End: 2022-07-15

## 2022-07-15 DIAGNOSIS — Z12.31 ENCOUNTER FOR SCREENING MAMMOGRAM FOR MALIGNANT NEOPLASM OF BREAST: ICD-10-CM

## 2022-07-15 DIAGNOSIS — Z98.890 OTHER SPECIFIED POSTPROCEDURAL STATES: Chronic | ICD-10-CM

## 2022-07-15 PROCEDURE — 77063 BREAST TOMOSYNTHESIS BI: CPT | Mod: 26

## 2022-07-15 PROCEDURE — 77067 SCR MAMMO BI INCL CAD: CPT | Mod: 26

## 2022-07-18 DIAGNOSIS — M54.9 DORSALGIA, UNSPECIFIED: ICD-10-CM

## 2022-07-18 DIAGNOSIS — R73.03 PREDIABETES: ICD-10-CM

## 2022-07-18 DIAGNOSIS — R10.9 UNSPECIFIED ABDOMINAL PAIN: ICD-10-CM

## 2022-07-18 DIAGNOSIS — J45.909 UNSPECIFIED ASTHMA, UNCOMPLICATED: ICD-10-CM

## 2022-07-18 DIAGNOSIS — G62.9 POLYNEUROPATHY, UNSPECIFIED: ICD-10-CM

## 2022-07-18 DIAGNOSIS — R20.2 PARESTHESIA OF SKIN: ICD-10-CM

## 2022-07-18 DIAGNOSIS — R10.13 EPIGASTRIC PAIN: ICD-10-CM

## 2022-07-25 PROBLEM — R20.2 RIGHT LEG PARESTHESIAS: Status: ACTIVE | Noted: 2018-11-02

## 2022-07-25 PROBLEM — R10.13 CHRONIC EPIGASTRIC PAIN: Status: ACTIVE | Noted: 2018-05-22

## 2022-07-25 NOTE — PHYSICAL EXAM
[No Respiratory Distress] : no respiratory distress  [No Accessory Muscle Use] : no accessory muscle use [Clear to Auscultation] : lungs were clear to auscultation bilaterally [Normal Rate] : normal rate  [Regular Rhythm] : with a regular rhythm [Normal S1, S2] : normal S1 and S2 [No Murmur] : no murmur heard [Soft] : abdomen soft [de-identified] : Appears uncomfortable and  in pain  [de-identified] : Epigastric tenderness  [de-identified] : Diffuse spinal tenderness

## 2022-07-25 NOTE — ASSESSMENT
[FreeTextEntry1] : 52 year old female patient with pre-DM, GERD, ASTHMA, and History of fall in 2010 s/p spinal surgeries and lumbar fusion in 2018 s/p neuro\par \par # Chronic back pain\par - Status post multiple steroid injections \par - s/p SCS implant with some improvement, reprogrammed on 12/2/21\par - Continue all current meds tizanidine 4mg BID, Gabapentin 800mg TID, tylenol, lido patch. Hold Naproxen 500mg BID PRN given epigastric pain. Start Percocet \par - Follow up with pain management for injections\par - referral for PT (insurance won't cover)\par - Needs f/u with neurosx \par \par Pre DM \par - Last hba1c 6.0 <- 6.2 in July 2022\par - continue diet control\par \par \par Asthma \par * Controlled.\par * Home meds Albuterol, Montelukast, Diphenhydramine\par - Continue Albuterol, Montelukast, Diphenhydramine\par - Keep off smoking\par \par HCM\par - Last colonoscopy 03/2020, 2 polyps (tubular adenomas)-> repeat in 2023\par - Next Mammo July 15th, 2022\par - Last PAP smear July 2021\par - Recieved COVID vaccine Pfizer 7/6/21, 7/21/21. x 2 only\par \par RTC in 3ms \par .

## 2022-07-25 NOTE — REVIEW OF SYSTEMS
[Abdominal Pain] : abdominal pain [Heartburn] : heartburn [Joint Pain] : joint pain [Joint Stiffness] : joint stiffness [Muscle Weakness] : muscle weakness [Muscle Pain] : muscle pain [Back Pain] : back pain [Headache] : headache [Fever] : no fever [Chills] : no chills [Fatigue] : no fatigue [Chest Pain] : no chest pain [Palpitations] : no palpitations [Lower Ext Edema] : no lower extremity edema [Shortness Of Breath] : no shortness of breath [Wheezing] : no wheezing [Cough] : no cough [Dyspnea on Exertion] : no dyspnea on exertion [Nausea] : no nausea [Constipation] : no constipation [Diarrhea] : diarrhea [Vomiting] : no vomiting [Melena] : no melena [Joint Swelling] : no joint swelling

## 2022-07-25 NOTE — HISTORY OF PRESENT ILLNESS
[FreeTextEntry1] : Lower back pain  [de-identified] : 52 year old female patient with pre-DM, GERD, ASTHMA, and History of fall in 2010 followed by neck, back, and shoulder pain status post multiple steroid injections and spinal surgeries and lumbar fusion in 2018 s/p neurosurgery follow up with Dr Wilson s/p SCS implant presenting for follow up. Still c/o 10/10 neck, back pain, radiating to the arms R>L, pt reports minimal improvement with naproxen, gabapentin, lido patch, Tizanidine and spinal injections. Unable to c/w PT as insurance will not cover it.  Endorses epigastric pain.

## 2022-08-15 ENCOUNTER — APPOINTMENT (OUTPATIENT)
Dept: NEUROSURGERY | Facility: CLINIC | Age: 52
End: 2022-08-15

## 2022-08-15 PROCEDURE — 99214 OFFICE O/P EST MOD 30 MIN: CPT

## 2022-08-17 NOTE — REVIEW OF SYSTEMS
[As Noted in HPI] : as noted in HPI [Fever] : no fever [Chills] : no chills [Eye Pain] : no eye pain [Red Eyes] : eyes not red [Loss Of Hearing] : no hearing loss [Chest Pain] : no chest pain [Palpitations] : no palpitations [Shortness Of Breath] : no shortness of breath [Wheezing] : no wheezing

## 2022-08-17 NOTE — HISTORY OF PRESENT ILLNESS
[FreeTextEntry1] : I am seeing Marta in f/u. She is doing very well with her thoracic SCS for her back and leg pain. She is no longer using a wheelchair and overall very happy with her relief. The last we spoke she had significant neck pain.\par \par Of note I reviewed her MRI today from 2021 ordered by Dr. Cardoso with ? enlarged LN.\par \par unclear if this was followed up. Will order Ct soft tissue neck +/- as well as repeat MRI C psine/Ct Cspine.

## 2022-08-19 RX ORDER — DRONABINOL 2.5 MG/1
2.5 CAPSULE ORAL
Qty: 30 | Refills: 3 | Status: DISCONTINUED | COMMUNITY
Start: 2021-12-17 | End: 2022-08-19

## 2022-09-07 ENCOUNTER — EMERGENCY (EMERGENCY)
Facility: HOSPITAL | Age: 52
LOS: 0 days | Discharge: HOME | End: 2022-09-07
Attending: EMERGENCY MEDICINE | Admitting: EMERGENCY MEDICINE

## 2022-09-07 VITALS
OXYGEN SATURATION: 99 % | HEIGHT: 60 IN | WEIGHT: 151.9 LBS | TEMPERATURE: 97 F | SYSTOLIC BLOOD PRESSURE: 149 MMHG | DIASTOLIC BLOOD PRESSURE: 70 MMHG | RESPIRATION RATE: 18 BRPM | HEART RATE: 74 BPM

## 2022-09-07 DIAGNOSIS — N39.0 URINARY TRACT INFECTION, SITE NOT SPECIFIED: ICD-10-CM

## 2022-09-07 DIAGNOSIS — Z91.013 ALLERGY TO SEAFOOD: ICD-10-CM

## 2022-09-07 DIAGNOSIS — Z98.890 OTHER SPECIFIED POSTPROCEDURAL STATES: Chronic | ICD-10-CM

## 2022-09-07 DIAGNOSIS — Z91.018 ALLERGY TO OTHER FOODS: ICD-10-CM

## 2022-09-07 DIAGNOSIS — J45.909 UNSPECIFIED ASTHMA, UNCOMPLICATED: ICD-10-CM

## 2022-09-07 DIAGNOSIS — Z87.39 PERSONAL HISTORY OF OTHER DISEASES OF THE MUSCULOSKELETAL SYSTEM AND CONNECTIVE TISSUE: ICD-10-CM

## 2022-09-07 DIAGNOSIS — R30.0 DYSURIA: ICD-10-CM

## 2022-09-07 DIAGNOSIS — R10.2 PELVIC AND PERINEAL PAIN: ICD-10-CM

## 2022-09-07 DIAGNOSIS — Z91.011 ALLERGY TO MILK PRODUCTS: ICD-10-CM

## 2022-09-07 DIAGNOSIS — R35.0 FREQUENCY OF MICTURITION: ICD-10-CM

## 2022-09-07 LAB
APPEARANCE UR: ABNORMAL
BACTERIA # UR AUTO: NEGATIVE — SIGNIFICANT CHANGE UP
BILIRUB UR-MCNC: NEGATIVE — SIGNIFICANT CHANGE UP
COLOR SPEC: YELLOW — SIGNIFICANT CHANGE UP
DIFF PNL FLD: ABNORMAL
EPI CELLS # UR: 1 /HPF — SIGNIFICANT CHANGE UP (ref 0–5)
GLUCOSE UR QL: SIGNIFICANT CHANGE UP
HYALINE CASTS # UR AUTO: 2 /LPF — SIGNIFICANT CHANGE UP (ref 0–7)
KETONES UR-MCNC: NEGATIVE — SIGNIFICANT CHANGE UP
LEUKOCYTE ESTERASE UR-ACNC: ABNORMAL
NITRITE UR-MCNC: NEGATIVE — SIGNIFICANT CHANGE UP
PH UR: 6.5 — SIGNIFICANT CHANGE UP (ref 5–8)
PROT UR-MCNC: ABNORMAL
RBC CASTS # UR COMP ASSIST: 14 /HPF — HIGH (ref 0–4)
SP GR SPEC: 1.02 — SIGNIFICANT CHANGE UP (ref 1.01–1.03)
UROBILINOGEN FLD QL: SIGNIFICANT CHANGE UP
WBC UR QL: >720 /HPF — HIGH (ref 0–5)

## 2022-09-07 PROCEDURE — 99284 EMERGENCY DEPT VISIT MOD MDM: CPT

## 2022-09-07 RX ORDER — CEFPODOXIME PROXETIL 100 MG
1 TABLET ORAL
Qty: 14 | Refills: 0
Start: 2022-09-07 | End: 2022-09-13

## 2022-09-07 RX ORDER — IBUPROFEN 200 MG
600 TABLET ORAL ONCE
Refills: 0 | Status: COMPLETED | OUTPATIENT
Start: 2022-09-07 | End: 2022-09-07

## 2022-09-07 RX ADMIN — Medication 600 MILLIGRAM(S): at 14:50

## 2022-09-07 NOTE — ED PROVIDER NOTE - PHYSICAL EXAMINATION
Physical Exam    Constitutional: No acute distress.   Eyes: Conjunctiva pink, Sclera clear, PERRLA, EOMI.  ENT: No sinus tenderness. No nasal discharge. No oropharyngeal erythema, edema, or exudates. Uvula midline.   Cardiovascular: Regular rate, regular rhythm. No noted murmurs rubs or gallops.  Respiratory: unlabored respiratory effort, clear to auscultation bilaterally no wheezing, rales or rhonchi  Gastrointestinal: Normal bowel sounds. soft, non distended, suprapubic tenderness. No flank tenderness.   Musculoskeletal: supple neck, no midline tenderness. No joint or bony deformity.   Integumentary: warm, dry, no rash  Neurologic: awake, alert, cranial nerves II-XII grossly intact, extremities’ motor and sensory functions grossly intact  Psychiatric: appropriate mood, appropriate affect

## 2022-09-07 NOTE — ED PROVIDER NOTE - NSFOLLOWUPINSTRUCTIONS_ED_ALL_ED_FT
Please take Cefpodoxime twice per day for 1 week. Follow up with your primary care provider in 1 week. Return to the ED if you develop fever, flank pain, nausea, vomiting, or if symptoms do not resolve after antibiotic treatment.     Urinary Tract Infection    A urinary tract infection (UTI) is an infection of any part of the urinary tract, which includes the kidneys, ureters, bladder, and urethra. Risk factors include ignoring your need to urinate, wiping back to front if female, being an uncircumcised male, and having diabetes or a weak immune system. Symptoms include frequent urination, pain or burning with urination, foul smelling urine, cloudy urine, pain in the lower abdomen, blood in the urine, and fever. If you were prescribed an antibiotic medicine, take it as told by your health care provider. Do not stop taking the antibiotic even if you start to feel better.    SEEK IMMEDIATE MEDICAL CARE IF YOU HAVE THE FOLLOWING SYMPTOMS: severe back or abdominal pain, inability to keep fluids or medicine down, dizziness/lightheadedness, or a change in mental status.

## 2022-09-07 NOTE — ED ADULT NURSE NOTE - INTERVENTIONS DEFINITIONS
[place] to call system/Instruct patient to call for assistance/Provide visual cue, wrist band, yellow gown, etc./Monitor gait and stability
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
## 2022-09-07 NOTE — ED PROVIDER NOTE - NS ED ROS FT
Constitutional: (-) fever  Eyes/ENT: (-) blurry vision, (-) epistaxis  Cardiovascular: (-) chest pain, (-) syncope  Respiratory: (-) cough, (-) shortness of breath  Gastrointestinal: (-) nausea (-) vomiting, (-) diarrhea (+) pelvic pain and pressure   : (+) dysuria (+) frequency (+) blood in the urine (-) flank pain   Musculoskeletal: (-) neck pain, (-) back pain, (-) joint pain  Integumentary: (-) rash, (-) edema  Neurological: (-) headache, (-) altered mental status  Psychiatric: (-) hallucinations  Allergic/Immunologic: (-) pruritus

## 2022-09-07 NOTE — ED PROVIDER NOTE - PATIENT PORTAL LINK FT
You can access the FollowMyHealth Patient Portal offered by [facility] by registering at the following website: [email or web address]. By joining [facility]’s FollowMyHealth portal, you will also be able to view your health information using other applications (apps) compatible with our system.

## 2022-09-07 NOTE — ED ADULT NURSE NOTE - OBJECTIVE STATEMENT
52F reports painful burning urination for the past five days.  No blood in urine or fevers as per patient.  She also reports suprapubic and lower back pain that is worse when she urinates or walks.  On exam, she is alert and oriented with no respiratory distress.  She appears to be comfortable while seated.  No other complaints.

## 2022-09-07 NOTE — ED PROVIDER NOTE - CLINICAL SUMMARY MEDICAL DECISION MAKING FREE TEXT BOX
52-year-old female presenting for evaluation of dysuria with associated increased frequency for the past week.  Associated mild suprapubic pressure/discomfort.  No flank pain.  No fevers chills or nausea vomiting.  Well appearing, NAD, non toxic. NCAT PERRLA EOMI neck supple non tender normal wob cta bl rrr abdomen s nt nd no rebound no guarding WWPx4 neuro non focal no CVA tenderness bilaterally.  Comfortable with discharge and follow-up outpatient, strict return precautions given. Endorses understanding of all of this and aware that they can return at any time for new or concerning symptoms. No further questions or concerns at this time

## 2022-09-10 ENCOUNTER — RESULT REVIEW (OUTPATIENT)
Age: 52
End: 2022-09-10

## 2022-09-10 ENCOUNTER — OUTPATIENT (OUTPATIENT)
Dept: OUTPATIENT SERVICES | Facility: HOSPITAL | Age: 52
LOS: 1 days | Discharge: HOME | End: 2022-09-10

## 2022-09-10 DIAGNOSIS — M47.812 SPONDYLOSIS WITHOUT MYELOPATHY OR RADICULOPATHY, CERVICAL REGION: ICD-10-CM

## 2022-09-10 DIAGNOSIS — Z98.890 OTHER SPECIFIED POSTPROCEDURAL STATES: Chronic | ICD-10-CM

## 2022-09-10 PROCEDURE — 72141 MRI NECK SPINE W/O DYE: CPT | Mod: 26

## 2022-09-15 ENCOUNTER — NON-APPOINTMENT (OUTPATIENT)
Age: 52
End: 2022-09-15

## 2022-09-20 LAB
25(OH)D3 SERPL-MCNC: 25 NG/ML
ALBUMIN SERPL ELPH-MCNC: 4.6 G/DL
ALP BLD-CCNC: 127 U/L
ALT SERPL-CCNC: 30 U/L
ANION GAP SERPL CALC-SCNC: 11 MMOL/L
AST SERPL-CCNC: 18 U/L
BILIRUB SERPL-MCNC: 0.3 MG/DL
BUN SERPL-MCNC: 7 MG/DL
CALCIUM SERPL-MCNC: 9.5 MG/DL
CHLORIDE SERPL-SCNC: 107 MMOL/L
CHOLEST SERPL-MCNC: 185 MG/DL
CO2 SERPL-SCNC: 24 MMOL/L
CREAT SERPL-MCNC: 0.7 MG/DL
EGFR: 103 ML/MIN/1.73M2
ESTIMATED AVERAGE GLUCOSE: 126 MG/DL
GLUCOSE SERPL-MCNC: 108 MG/DL
HBA1C MFR BLD HPLC: 6 %
HDLC SERPL-MCNC: 54 MG/DL
LDLC SERPL CALC-MCNC: 107 MG/DL
NONHDLC SERPL-MCNC: 131 MG/DL
POTASSIUM SERPL-SCNC: 4.5 MMOL/L
PROT SERPL-MCNC: 7.4 G/DL
SODIUM SERPL-SCNC: 142 MMOL/L
TRIGL SERPL-MCNC: 119 MG/DL

## 2022-10-11 ENCOUNTER — NON-APPOINTMENT (OUTPATIENT)
Age: 52
End: 2022-10-11

## 2022-10-14 NOTE — H&P PST ADULT - REASON FOR ADMISSION
lumbar five sacral one transforaminal interbody fusion
Include Location In Plan?: No
Detail Level: Generalized

## 2022-10-20 ENCOUNTER — NON-APPOINTMENT (OUTPATIENT)
Age: 52
End: 2022-10-20

## 2022-10-20 ENCOUNTER — OUTPATIENT (OUTPATIENT)
Dept: OUTPATIENT SERVICES | Facility: HOSPITAL | Age: 52
LOS: 1 days | Discharge: HOME | End: 2022-10-20

## 2022-10-20 ENCOUNTER — APPOINTMENT (OUTPATIENT)
Dept: INTERNAL MEDICINE | Facility: CLINIC | Age: 52
End: 2022-10-20

## 2022-10-20 VITALS
BODY MASS INDEX: 29.45 KG/M2 | DIASTOLIC BLOOD PRESSURE: 85 MMHG | SYSTOLIC BLOOD PRESSURE: 145 MMHG | OXYGEN SATURATION: 98 % | WEIGHT: 150 LBS | HEART RATE: 79 BPM | TEMPERATURE: 97.1 F | HEIGHT: 60 IN

## 2022-10-20 DIAGNOSIS — G47.00 INSOMNIA, UNSPECIFIED: ICD-10-CM

## 2022-10-20 DIAGNOSIS — G62.9 POLYNEUROPATHY, UNSPECIFIED: ICD-10-CM

## 2022-10-20 DIAGNOSIS — Z98.890 OTHER SPECIFIED POSTPROCEDURAL STATES: Chronic | ICD-10-CM

## 2022-10-20 PROCEDURE — 99214 OFFICE O/P EST MOD 30 MIN: CPT | Mod: GC

## 2022-10-25 ENCOUNTER — OUTPATIENT (OUTPATIENT)
Dept: OUTPATIENT SERVICES | Facility: HOSPITAL | Age: 52
LOS: 1 days | Discharge: HOME | End: 2022-10-25

## 2022-10-25 ENCOUNTER — APPOINTMENT (OUTPATIENT)
Dept: OPHTHALMOLOGY | Facility: CLINIC | Age: 52
End: 2022-10-25

## 2022-10-25 DIAGNOSIS — Z98.890 OTHER SPECIFIED POSTPROCEDURAL STATES: Chronic | ICD-10-CM

## 2022-10-25 DIAGNOSIS — H04.123 DRY EYE SYNDROME OF BILATERAL LACRIMAL GLANDS: ICD-10-CM

## 2022-10-25 PROCEDURE — 92014 COMPRE OPH EXAM EST PT 1/>: CPT

## 2022-10-26 NOTE — ASSESSMENT
[FreeTextEntry1] : 52 year old female patient with pre-DM, GERD, ASTHMA, and History of fall in 2010 s/p spinal surgeries and lumbar fusion in 2018 s/p neuro\par \par #Generalize Anxiety Dz\par #possible MDD\par #insomnia\par #possible fibromyalgia; forgetfullness \par - psych referral; social work will see patient this visit\par - d/c trazadone; start venlafaxine 37.5mg bid and seroquel 25mg hs \par \par # Chronic back pain\par - Status post multiple steroid injections \par - s/p SCS implant with some improvement, reprogrammed on 12/2/21\par - Continue all current meds tizanidine 4mg BID, Gabapentin 800mg TID, tylenol, lido patch. Hold Naproxen 500mg BID PRN given epigastric pain. Start Percocet \par - has follow up with pain management \par - referral for PT (insurance won't cover)\par - followed up with neurosx\par \par #Epigastric pain\par - EGD 1/2022 wnl; GI wants to follow up for possible manometry testing \par \par #Pre DM\par #HLD \par - a1c 6.0 (6/ 2022) \par - lifestyle modifications \par \par #Asthma \par * Controlled.\par - Continue Albuterol, Montelukast, Diphenhydramine\par - Keep off smoking\par \par #HCM\par - Last colonoscopy 03/2020, 2 polyps (tubular adenomas)-> repeat in 2023\par - Mammo July 15th, 2022 wnl\par - Last PAP smear July 2021\par - Recieved COVID vaccine Pfizer 7/6/21, 7/21/21. x 2 only\par - flu shot administered 10/20/22\par \par summary: f/u 3mo, will orders labs during that visit, psych referral \par

## 2022-10-26 NOTE — HISTORY OF PRESENT ILLNESS
[FreeTextEntry1] : follow up chronic pain\par  [de-identified] : 52 year old female patient with pre-DM, GERD, ASTHMA, and History of fall in 2010 followed by neck, back, and shoulder pain status post multiple steroid injections and spinal surgeries and lumbar fusion in 2018 s/p neurosurgery follow up with Dr Wilson s/p SCS implant presenting for follow up. \par \par Patient is feeling depressed and wants to see a psychiatrist. Denies SI/HI. \par Reports her chronic severe back pain. No other medical complaints.

## 2022-10-27 DIAGNOSIS — G47.00 INSOMNIA, UNSPECIFIED: ICD-10-CM

## 2022-10-27 DIAGNOSIS — F41.1 GENERALIZED ANXIETY DISORDER: ICD-10-CM

## 2022-10-27 DIAGNOSIS — G62.9 POLYNEUROPATHY, UNSPECIFIED: ICD-10-CM

## 2022-10-27 DIAGNOSIS — M54.9 DORSALGIA, UNSPECIFIED: ICD-10-CM

## 2022-11-04 ENCOUNTER — NON-APPOINTMENT (OUTPATIENT)
Age: 52
End: 2022-11-04

## 2022-11-29 ENCOUNTER — NON-APPOINTMENT (OUTPATIENT)
Age: 52
End: 2022-11-29

## 2022-11-29 ENCOUNTER — APPOINTMENT (OUTPATIENT)
Dept: DERMATOLOGY | Facility: CLINIC | Age: 52
End: 2022-11-29

## 2022-11-29 ENCOUNTER — OUTPATIENT (OUTPATIENT)
Dept: OUTPATIENT SERVICES | Facility: HOSPITAL | Age: 52
LOS: 1 days | Discharge: HOME | End: 2022-11-29

## 2022-11-29 DIAGNOSIS — Z98.890 OTHER SPECIFIED POSTPROCEDURAL STATES: Chronic | ICD-10-CM

## 2022-11-29 DIAGNOSIS — L82.1 OTHER SEBORRHEIC KERATOSIS: ICD-10-CM

## 2022-11-29 PROCEDURE — 99204 OFFICE O/P NEW MOD 45 MIN: CPT | Mod: GC

## 2022-11-29 NOTE — PHYSICAL EXAM
[Alert] : alert [Oriented x 3] : ~L oriented x 3 [Well Nourished] : well nourished [No Clubbing] : no clubbing [No Edema] : no edema [FreeTextEntry3] : Hyperpigmented papules on b/l lateral forehead. \par Hyperpigmented flat papules on distal left forearm. \par Scattered cherry angiomas on arms and upper chest.

## 2022-11-29 NOTE — HISTORY OF PRESENT ILLNESS
[FreeTextEntry1] : referral [de-identified] : 53yo F w/ pmhx of preDM, GERD, asthma presents to derm clinic as a referral for hyperpigmented moles on forehead. \par \par She has noticed this for the past 5 years. The lesions have no grown in size and she does not get much sun exposure. Has also noticed hyperpigmented spots on her left arm for the past 2 years that have not grown and red spots.

## 2022-11-29 NOTE — END OF VISIT
[] : Resident [FreeTextEntry3] : SKs on upper part of face.\par Try topical retinoid which may benefit the smaller lesions most

## 2022-11-29 NOTE — ASSESSMENT
[FreeTextEntry1] : Impression: \par - seborrheic keratosis\par \par Plan: \par - tretinoin 0.25% cream nightly until improvement\par - f/u 6mo

## 2022-11-30 DIAGNOSIS — L82.1 OTHER SEBORRHEIC KERATOSIS: ICD-10-CM

## 2022-12-28 ENCOUNTER — APPOINTMENT (OUTPATIENT)
Dept: OPHTHALMOLOGY | Facility: CLINIC | Age: 52
End: 2022-12-28

## 2023-01-09 ENCOUNTER — NON-APPOINTMENT (OUTPATIENT)
Age: 53
End: 2023-01-09

## 2023-01-10 ENCOUNTER — NON-APPOINTMENT (OUTPATIENT)
Age: 53
End: 2023-01-10

## 2023-01-10 NOTE — ASU PATIENT PROFILE, ADULT - PRO ARRIVE FROM
Quality 226: Preventive Care And Screening: Tobacco Use: Screening And Cessation Intervention: Patient screened for tobacco use and is an ex/non-smoker Detail Level: Detailed Quality 110: Preventive Care And Screening: Influenza Immunization: Influenza Immunization Administered during Influenza season home

## 2023-01-20 ENCOUNTER — NON-APPOINTMENT (OUTPATIENT)
Age: 53
End: 2023-01-20

## 2023-01-26 ENCOUNTER — OUTPATIENT (OUTPATIENT)
Dept: OUTPATIENT SERVICES | Facility: HOSPITAL | Age: 53
LOS: 1 days | Discharge: HOME | End: 2023-01-26

## 2023-01-26 ENCOUNTER — APPOINTMENT (OUTPATIENT)
Dept: INTERNAL MEDICINE | Facility: CLINIC | Age: 53
End: 2023-01-26
Payer: MEDICAID

## 2023-01-26 ENCOUNTER — NON-APPOINTMENT (OUTPATIENT)
Age: 53
End: 2023-01-26

## 2023-01-26 VITALS
DIASTOLIC BLOOD PRESSURE: 82 MMHG | WEIGHT: 150 LBS | HEIGHT: 60 IN | OXYGEN SATURATION: 97 % | SYSTOLIC BLOOD PRESSURE: 138 MMHG | HEART RATE: 71 BPM | BODY MASS INDEX: 29.45 KG/M2 | TEMPERATURE: 97.5 F

## 2023-01-26 DIAGNOSIS — R10.9 UNSPECIFIED ABDOMINAL PAIN: ICD-10-CM

## 2023-01-26 DIAGNOSIS — Z98.890 OTHER SPECIFIED POSTPROCEDURAL STATES: Chronic | ICD-10-CM

## 2023-01-26 PROCEDURE — 99214 OFFICE O/P EST MOD 30 MIN: CPT | Mod: GC

## 2023-01-26 RX ORDER — VENLAFAXINE 37.5 MG/1
37.5 TABLET ORAL TWICE DAILY
Qty: 60 | Refills: 5 | Status: DISCONTINUED | COMMUNITY
Start: 2022-10-20 | End: 2023-01-26

## 2023-01-31 DIAGNOSIS — R10.9 UNSPECIFIED ABDOMINAL PAIN: ICD-10-CM

## 2023-01-31 DIAGNOSIS — M47.812 SPONDYLOSIS WITHOUT MYELOPATHY OR RADICULOPATHY, CERVICAL REGION: ICD-10-CM

## 2023-01-31 DIAGNOSIS — J45.909 UNSPECIFIED ASTHMA, UNCOMPLICATED: ICD-10-CM

## 2023-01-31 DIAGNOSIS — M54.9 DORSALGIA, UNSPECIFIED: ICD-10-CM

## 2023-01-31 NOTE — HISTORY OF PRESENT ILLNESS
[FreeTextEntry1] : in for med refills and review of pain regimen for chronic back pain [de-identified] : \par 52 year old female patient with pre-DM, GERD, ASTHMA, and History of fall in 2010 s/p spinal surgeries and lumbar fusion in 2018 s/p neuro\par \par

## 2023-01-31 NOTE — PHYSICAL EXAM
[No Acute Distress] : no acute distress [Well Nourished] : well nourished [Well Developed] : well developed [Well-Appearing] : well-appearing [Normal Sclera/Conjunctiva] : normal sclera/conjunctiva [PERRL] : pupils equal round and reactive to light [EOMI] : extraocular movements intact [Normal Outer Ear/Nose] : the outer ears and nose were normal in appearance [Normal Oropharynx] : the oropharynx was normal [No JVD] : no jugular venous distention [No Lymphadenopathy] : no lymphadenopathy [Supple] : supple [Thyroid Normal, No Nodules] : the thyroid was normal and there were no nodules present [No Respiratory Distress] : no respiratory distress  [No Accessory Muscle Use] : no accessory muscle use [Normal Rate] : normal rate  [Clear to Auscultation] : lungs were clear to auscultation bilaterally [Regular Rhythm] : with a regular rhythm [Normal S1, S2] : normal S1 and S2 [No Murmur] : no murmur heard [No Carotid Bruits] : no carotid bruits [No Abdominal Bruit] : a ~M bruit was not heard ~T in the abdomen [No Varicosities] : no varicosities [Pedal Pulses Present] : the pedal pulses are present [No Edema] : there was no peripheral edema [No Palpable Aorta] : no palpable aorta [No Extremity Clubbing/Cyanosis] : no extremity clubbing/cyanosis [Soft] : abdomen soft [Non Tender] : non-tender [Non-distended] : non-distended [No Masses] : no abdominal mass palpated [No HSM] : no HSM [Normal Bowel Sounds] : normal bowel sounds [Normal Posterior Cervical Nodes] : no posterior cervical lymphadenopathy [Normal Anterior Cervical Nodes] : no anterior cervical lymphadenopathy [No CVA Tenderness] : no CVA  tenderness [No Spinal Tenderness] : no spinal tenderness [No Joint Swelling] : no joint swelling [Grossly Normal Strength/Tone] : grossly normal strength/tone [Coordination Grossly Intact] : coordination grossly intact [No Rash] : no rash [No Focal Deficits] : no focal deficits [Normal Gait] : normal gait [Deep Tendon Reflexes (DTR)] : deep tendon reflexes were 2+ and symmetric [Normal Affect] : the affect was normal [Normal Insight/Judgement] : insight and judgment were intact

## 2023-01-31 NOTE — ASSESSMENT
[FreeTextEntry1] : 52 year old female patient with pre-DM, GERD, ASTHMA, and History of fall in 2010 s/p spinal surgeries and lumbar fusion in 2018 s/p neuro\par \par # Chronic back pain\par - falied multiple attempts to resolve pain, now with chronic Sx\par - Status post multiple steroid injections \par - s/p SCS implant with some improvement, reprogrammed on 12/2/21\par - Patient seeing Neurosurgery and Pain management. Continue all current meds tizanidine 4mg BID, Gabapentin 800mg TID, tylenol, lido patch. Hold Naproxen 500mg BID PRN given epigastric pain. Start Percocet \par - has follow up with pain management \par - counselled risk for continued surgeries\par \par \par #Generalize Anxiety Dz with insomnia\par - possible fibromyalgia; \par - psych referral; social work will see patient this visit\par - now on duloxitine and seroquel 25mg hs \par \par \par #Epigastric pain\par - EGD 1/2022 wnl; needs further GI evaluation\par - on PPI\par \par #Pre DM\par #HLD \par - a1c 6.0 (6/ 2022) \par - lifestyle modifications \par \par #Asthma \par * Controlled.\par - Continue Albuterol, Montelukast, Diphenhydramine\par - Keep off smoking\par \par #HCM\par - Last colonoscopy 03/2020, 2 polyps (tubular adenomas)-> repeat in 2023\par - Recieved COVID vaccine Pfizer 7/6/21, 7/21/21. x 2 only\par - flu shot administered 10/20/22\par

## 2023-02-10 LAB
25(OH)D3 SERPL-MCNC: 27 NG/ML
ALBUMIN SERPL ELPH-MCNC: 4.9 G/DL
ALP BLD-CCNC: 101 U/L
ALT SERPL-CCNC: 16 U/L
ANION GAP SERPL CALC-SCNC: 18 MMOL/L
AST SERPL-CCNC: 9 U/L
BASOPHILS # BLD AUTO: 0.04 K/UL
BASOPHILS NFR BLD AUTO: 0.4 %
BILIRUB SERPL-MCNC: 0.3 MG/DL
BUN SERPL-MCNC: 25 MG/DL
CALCIUM SERPL-MCNC: 10 MG/DL
CHLORIDE SERPL-SCNC: 102 MMOL/L
CHOLEST SERPL-MCNC: 208 MG/DL
CO2 SERPL-SCNC: 20 MMOL/L
CREAT SERPL-MCNC: 0.8 MG/DL
EGFR: 88 ML/MIN/1.73M2
EOSINOPHIL # BLD AUTO: 0.02 K/UL
EOSINOPHIL NFR BLD AUTO: 0.2 %
ESTIMATED AVERAGE GLUCOSE: 117 MG/DL
GLUCOSE SERPL-MCNC: 105 MG/DL
HBA1C MFR BLD HPLC: 5.7 %
HCT VFR BLD CALC: 41.5 %
HDLC SERPL-MCNC: 68 MG/DL
HGB BLD-MCNC: 13.2 G/DL
IMM GRANULOCYTES NFR BLD AUTO: 0.4 %
LDLC SERPL CALC-MCNC: 122 MG/DL
LYMPHOCYTES # BLD AUTO: 3.03 K/UL
LYMPHOCYTES NFR BLD AUTO: 33.4 %
MAN DIFF?: NORMAL
MCHC RBC-ENTMCNC: 30.6 PG
MCHC RBC-ENTMCNC: 31.8 G/DL
MCV RBC AUTO: 96.3 FL
MONOCYTES # BLD AUTO: 0.58 K/UL
MONOCYTES NFR BLD AUTO: 6.4 %
NEUTROPHILS # BLD AUTO: 5.36 K/UL
NEUTROPHILS NFR BLD AUTO: 59.2 %
NONHDLC SERPL-MCNC: 140 MG/DL
PLATELET # BLD AUTO: 187 K/UL
POTASSIUM SERPL-SCNC: 4.6 MMOL/L
PROT SERPL-MCNC: 7.8 G/DL
RBC # BLD: 4.31 M/UL
RBC # FLD: 12.5 %
SODIUM SERPL-SCNC: 140 MMOL/L
TRIGL SERPL-MCNC: 89 MG/DL
TSH SERPL-ACNC: 1.25 UIU/ML
WBC # FLD AUTO: 9.07 K/UL

## 2023-02-10 NOTE — PHYSICAL EXAM
PLAN    #Acute hypoxemic resp failure, extubated, no reintubated   #ARDS  #Sepsis POA  #Possible CAP  #COVID +  - MRSA (-), Strep (-), Legionella (-) on 2/1   - Covid + on 1/31/22  - CT Chest No Cont (01.31.23 @ 21:17):  Mild right pleural effusion with adjacent  atelectasis. Bibasilar atelectatic changes versus scarring. Partial obstruction of the distal right lower lobe bronchioles. Otherwise no  evidence of central endobronchial obstruction. No focal consolidation.  Right upper lobe pulmonary cyst (4-130). No pleural effusion. No  pneumothorax.  - Xray Chest 1 View-PORTABLE IMMEDIATE (Xray Chest 1 View-PORTABLE IMMEDIATE .) (02.01.23 @ 06:31):  Worsening opacifications and effusions. Support devices as described.  - No RDV given as patient was out of the window and had NOLA earlier in the hospital course   - c/w dexamethasone 6 mg q24h ( 2/1 -2/2, 2/4- current) for 10 days   - No TOCI given as patient had positive blood cultures   - D-Dimer 818, Ferritin 347, LDH (203 --> 139), CRP (107.1 --> 42), procal ( 14.3 -->37.2 --> 49.6)   - daily CXR  - daily ABG   - extubated 2/7, desaturated on BIPAP, now reintubated 2/9    #Septic shock  #ESBL UTI  #G+C bacteremia/ COAG - STAP  - UA 1/31 showed LE+, WBC+, Bacteria+, Epithelial cells 27+  - UCx: proteus mirabilis ESBL   - BCx: staph epidermidis mehicillin resistant ( most likely contaminanant as per ID)   - s/p Levaquin ( 1/31 - 2/3), Rocephin ( 2/1-2/3),   - c/w on Meropenem 500 mg q24h ( 2/3 - current)   - ID following     #NOLA  likely ATN vs prerenal improving  #H/O congenital unilateral kidney  #Hypercalcemia   - Renal US 2/4/23: No right hydronephrosis; left kidney congenitally absent   - Creat trending down ( 5.1 --> 2.1 --> 1.6)   - strict i/o  - Corrected Ca around 11/ PTH mediated; needs hypercalcemia workup once extubated   - no need for RRT at this time   - Renally dose medications   - nephrology following   - Lasix 40 IV once 2/7  - Metolazone 5mg given 2/9     #H/O A Fib  - rate controlled   - Holding AC    #Low TSH; 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   - TSH: 0.15   - Total T3: 1.23  - Free T4: 1.2    #HTN  - Started labetalol 100mg q8h and metolazone 5mg   - DC hydrochlorothiazide   - continue losartan and amlodipine     #Dysphagia  - speech and swallow - did not pass FEES  - NPO for now  - ENT for damaged vocal cords - will follow      #hypernatremia  - d5 and free water flushes   - trend BMP    #Melena  - fu repeat CBC @ 11, 4pm  - Type and screen active   - coags active   - gastric lavage     #Vaginal bleeding  - Consult OBGYN      #DVT PPx: on hold   #GI PPx: Pantoprazole   #Activity: IAT   #Diet: NPO  #Code: Full [Well Nourished] : well nourished [Well Developed] : well developed [Well-Appearing] : well-appearing [Normal] : no rash [de-identified] : in distress due to pain [de-identified] : Tenderness in the upper back midline radiating to shoulders

## 2023-02-15 ENCOUNTER — NON-APPOINTMENT (OUTPATIENT)
Age: 53
End: 2023-02-15

## 2023-02-23 ENCOUNTER — NON-APPOINTMENT (OUTPATIENT)
Age: 53
End: 2023-02-23

## 2023-02-27 ENCOUNTER — NON-APPOINTMENT (OUTPATIENT)
Age: 53
End: 2023-02-27

## 2023-03-13 ENCOUNTER — APPOINTMENT (OUTPATIENT)
Dept: NEUROSURGERY | Facility: CLINIC | Age: 53
End: 2023-03-13
Payer: MEDICAID

## 2023-03-13 VITALS — WEIGHT: 150 LBS | BODY MASS INDEX: 29.45 KG/M2 | HEIGHT: 60 IN

## 2023-03-13 DIAGNOSIS — M96.1 POSTLAMINECTOMY SYNDROME, NOT ELSEWHERE CLASSIFIED: ICD-10-CM

## 2023-03-13 DIAGNOSIS — Z98.1 ARTHRODESIS STATUS: ICD-10-CM

## 2023-03-13 PROCEDURE — 99213 OFFICE O/P EST LOW 20 MIN: CPT

## 2023-03-20 ENCOUNTER — NON-APPOINTMENT (OUTPATIENT)
Age: 53
End: 2023-03-20

## 2023-03-21 ENCOUNTER — NON-APPOINTMENT (OUTPATIENT)
Age: 53
End: 2023-03-21

## 2023-04-05 ENCOUNTER — NON-APPOINTMENT (OUTPATIENT)
Age: 53
End: 2023-04-05

## 2023-04-06 ENCOUNTER — APPOINTMENT (OUTPATIENT)
Dept: INTERNAL MEDICINE | Facility: CLINIC | Age: 53
End: 2023-04-06

## 2023-04-06 ENCOUNTER — APPOINTMENT (OUTPATIENT)
Dept: INTERNAL MEDICINE | Facility: CLINIC | Age: 53
End: 2023-04-06
Payer: MEDICAID

## 2023-04-06 ENCOUNTER — NON-APPOINTMENT (OUTPATIENT)
Age: 53
End: 2023-04-06

## 2023-04-06 ENCOUNTER — OUTPATIENT (OUTPATIENT)
Dept: OUTPATIENT SERVICES | Facility: HOSPITAL | Age: 53
LOS: 1 days | End: 2023-04-06
Payer: MEDICAID

## 2023-04-06 VITALS
SYSTOLIC BLOOD PRESSURE: 148 MMHG | HEART RATE: 76 BPM | HEIGHT: 60 IN | OXYGEN SATURATION: 98 % | DIASTOLIC BLOOD PRESSURE: 87 MMHG | TEMPERATURE: 97 F | BODY MASS INDEX: 29.45 KG/M2 | WEIGHT: 150 LBS

## 2023-04-06 DIAGNOSIS — Z98.890 OTHER SPECIFIED POSTPROCEDURAL STATES: Chronic | ICD-10-CM

## 2023-04-06 DIAGNOSIS — Z00.00 ENCOUNTER FOR GENERAL ADULT MEDICAL EXAMINATION WITHOUT ABNORMAL FINDINGS: ICD-10-CM

## 2023-04-06 PROCEDURE — 99214 OFFICE O/P EST MOD 30 MIN: CPT | Mod: GC

## 2023-04-06 PROCEDURE — 99214 OFFICE O/P EST MOD 30 MIN: CPT

## 2023-04-06 NOTE — PHYSICAL EXAM
[No Acute Distress] : no acute distress [Well Nourished] : well nourished [Well Developed] : well developed [Well-Appearing] : well-appearing [Normal Sclera/Conjunctiva] : normal sclera/conjunctiva [Normal Outer Ear/Nose] : the outer ears and nose were normal in appearance [No JVD] : no jugular venous distention [Supple] : supple [Thyroid Normal, No Nodules] : the thyroid was normal and there were no nodules present [No Respiratory Distress] : no respiratory distress  [No Accessory Muscle Use] : no accessory muscle use [Normal Rate] : normal rate  [Regular Rhythm] : with a regular rhythm [No Edema] : there was no peripheral edema [Soft] : abdomen soft [No HSM] : no HSM [No Joint Swelling] : no joint swelling [No Rash] : no rash [Coordination Grossly Intact] : coordination grossly intact [Normal Affect] : the affect was normal [Normal Insight/Judgement] : insight and judgment were intact

## 2023-04-06 NOTE — ASSESSMENT
[FreeTextEntry1] : 53 year old female patient with pre-DM, GERD, ASTHMA, and History of fall in 2010 s/p spinal surgeries and lumbar fusion in 2018 s/p neuro\par \par # Chronic back pain\par - falied multiple attempts to resolve pain, now with chronic Sx\par - Status post multiple steroid injections \par - s/p SCS implant with some improvement, reprogrammed on 12/2/21\par - Patient seeing Neurosurgery and Pain management. Continue all current meds tizanidine 4mg BID, Gabapentin 800mg TID, tylenol, lido patch. Hold Naproxen 500mg BID PRN given epigastric pain. Started on Percocet \par - would increase duloxitine, continue diclofenac gel and lidocaine patches\par - I would also suggest patient get evaluated for medicinal marijuana, given poor response to other treatments\par - follow up with pain management \par - counselled risk for continued surgeries\par \par \par #Generalize Anxiety Dz with insomnia\par - possible fibromyalgia; \par - psych referral; social work will see patient this visit\par - now on duloxitine and seroquel 25mg hs \par \par \par #Epigastric pain\par - EGD 1/2022 wnl; needs further GI evaluation\par - on PPI\par \par #Pre DM\par #HLD \par - a1c 6.0 (6/ 2022) \par - lifestyle modifications \par \par #Asthma \par * Controlled.\par - Continue Albuterol, Montelukast, Diphenhydramine\par - Keep off smoking\par \par #HCM\par - Last colonoscopy 03/2020, 2 polyps (tubular adenomas)-> repeat in 2023\par - Recieved COVID vaccine Pfizer 7/6/21, 7/21/21. x 2 only\par - flu shot administered 10/20/22\par

## 2023-04-06 NOTE — HISTORY OF PRESENT ILLNESS
[FreeTextEntry1] : in for med refills and review of pain regimen for chronic back pain [de-identified] : \par 53 year old female patient with pre-DM, GERD, ASTHMA, and History of fall in 2010 s/p spinal surgeries and lumbar fusion in 2018 s/p neuro\par \par Pt states her memory is bad, she feels dizzy, bruises easily, forgets things easily. Pt states she has been getting steroid injections, last one was last week

## 2023-04-07 RX ORDER — PANTOPRAZOLE 40 MG/1
40 TABLET, DELAYED RELEASE ORAL
Qty: 60 | Refills: 6 | Status: DISCONTINUED | COMMUNITY
Start: 2021-12-17 | End: 2023-04-07

## 2023-04-11 ENCOUNTER — NON-APPOINTMENT (OUTPATIENT)
Age: 53
End: 2023-04-11

## 2023-04-18 ENCOUNTER — APPOINTMENT (OUTPATIENT)
Dept: NEUROLOGY | Facility: CLINIC | Age: 53
End: 2023-04-18
Payer: MEDICAID

## 2023-04-18 ENCOUNTER — OUTPATIENT (OUTPATIENT)
Dept: OUTPATIENT SERVICES | Facility: HOSPITAL | Age: 53
LOS: 1 days | End: 2023-04-18
Payer: MEDICAID

## 2023-04-18 VITALS
HEIGHT: 60 IN | SYSTOLIC BLOOD PRESSURE: 123 MMHG | OXYGEN SATURATION: 98 % | TEMPERATURE: 97.3 F | HEART RATE: 77 BPM | BODY MASS INDEX: 29.45 KG/M2 | WEIGHT: 150 LBS | DIASTOLIC BLOOD PRESSURE: 83 MMHG

## 2023-04-18 DIAGNOSIS — R41.3 OTHER AMNESIA: ICD-10-CM

## 2023-04-18 DIAGNOSIS — Z98.890 OTHER SPECIFIED POSTPROCEDURAL STATES: Chronic | ICD-10-CM

## 2023-04-18 DIAGNOSIS — Z00.00 ENCOUNTER FOR GENERAL ADULT MEDICAL EXAMINATION WITHOUT ABNORMAL FINDINGS: ICD-10-CM

## 2023-04-18 PROCEDURE — 99204 OFFICE O/P NEW MOD 45 MIN: CPT

## 2023-04-18 NOTE — ASSESSMENT
[FreeTextEntry1] : 54 yo female with PMH of chronic neck pain with daily Percocet use and steroid injection, and "stimulating wires" in her back presents to neurology office as a new patient to be assessed for memory difficulties for past few years. Her MOCA score is 17/30 as she missed scores on attention, repetition, delayed recalls and naming with poor effort on finishing each tasks.  Also during interview she was able to provide detailed history of her prior medications however could not remember certain things like her her marriage year suggestive of selective memory. In general having both short term and long term memory deficit argue against diagnosis of dementia as solo reason for her symptoms, hence chronic pain, polypharmacy and behavior etiology seem to be major contributive. Will obtain MRI brain and lab work up and neuropsychiatry referral.

## 2023-04-19 ENCOUNTER — NON-APPOINTMENT (OUTPATIENT)
Age: 53
End: 2023-04-19

## 2023-04-19 DIAGNOSIS — M54.9 DORSALGIA, UNSPECIFIED: ICD-10-CM

## 2023-04-19 DIAGNOSIS — J45.909 UNSPECIFIED ASTHMA, UNCOMPLICATED: ICD-10-CM

## 2023-04-19 DIAGNOSIS — G89.29 OTHER CHRONIC PAIN: ICD-10-CM

## 2023-04-19 PROBLEM — Z98.1 STATUS POST LAMINECTOMY WITH SPINAL FUSION: Status: ACTIVE | Noted: 2021-01-25

## 2023-04-19 PROBLEM — M96.1 LUMBAR POST-LAMINECTOMY SYNDROME: Status: ACTIVE | Noted: 2021-01-25

## 2023-04-19 NOTE — HISTORY OF PRESENT ILLNESS
[FreeTextEntry1] : Kirsty has reasonable relief of her back and leg pain with her [employer] SCS device. She has complaints of neck pain. MRI of the cervical spine from 2022 shows\par \par No significant change in comparison to prior MRI of 7/8/2021.\par \par Overall unchanged multilevel degenerative changes most significant at the C4-C5 level resulting in moderate spinal canal stenosis as well as bilateral neuroforaminal narrowing.\par \par her neck pain appears myofascial on exam.

## 2023-04-19 NOTE — DIETITIAN INITIAL EVALUATION ADULT. - SOURCE
patient Doxepin Counseling:  Patient advised that the medication is sedating and not to drive a car after taking this medication. Patient informed of potential adverse effects including but not limited to dry mouth, urinary retention, and blurry vision.  The patient verbalized understanding of the proper use and possible adverse effects of doxepin.  All of the patient's questions and concerns were addressed.

## 2023-04-19 NOTE — PHYSICAL EXAM
[FreeTextEntry1] : Constitutional: Well appearing, no distress\par HEENT: Normocephalic Atraumatic\par Psychiatric: Alert and oriented to all spheres, normal mood\par Pulmonary: no respiratory distress\par Abdomen: non-distended\par Vascular/Extremities: no edema, no cyanosis, no clubbing\par \par \par Neurologic: \par CN II-XII grossly intact\par ROM: decreased in C spine, decreased ROM due to pain especially on external rotation at left shoulder\par Palpation: pain to palpation cervical musculature\par Strength: Full strength in all major muscle groups, no atrophy\par Sensation: Full sensation to light touch in all extremities\par Reflexes: \par  2+ patellar\par  2+ biceps\par  2+ ankle jerk\par  No Mari's\par  No clonus\par  No babinski\par \par Signs:\par SLR negative\par L'hermitte's negative\par \par Gait: antalgic uses assistive device\par \par

## 2023-04-26 ENCOUNTER — APPOINTMENT (OUTPATIENT)
Dept: PSYCHIATRY | Facility: CLINIC | Age: 53
End: 2023-04-26
Payer: MEDICAID

## 2023-04-26 ENCOUNTER — OUTPATIENT (OUTPATIENT)
Dept: OUTPATIENT SERVICES | Facility: HOSPITAL | Age: 53
LOS: 1 days | End: 2023-04-26
Payer: MEDICAID

## 2023-04-26 DIAGNOSIS — Z98.890 OTHER SPECIFIED POSTPROCEDURAL STATES: Chronic | ICD-10-CM

## 2023-04-26 DIAGNOSIS — F33.1 MAJOR DEPRESSIVE DISORDER, RECURRENT, MODERATE: ICD-10-CM

## 2023-04-26 PROCEDURE — 90792 PSYCH DIAG EVAL W/MED SRVCS: CPT | Mod: GC

## 2023-04-26 PROCEDURE — 99401 PREV MED CNSL INDIV APPRX 15: CPT

## 2023-04-26 PROCEDURE — 90792 PSYCH DIAG EVAL W/MED SRVCS: CPT

## 2023-04-27 VITALS
TEMPERATURE: 98.2 F | BODY MASS INDEX: 30.04 KG/M2 | SYSTOLIC BLOOD PRESSURE: 125 MMHG | RESPIRATION RATE: 18 BRPM | DIASTOLIC BLOOD PRESSURE: 68 MMHG | HEART RATE: 75 BPM | WEIGHT: 153 LBS | OXYGEN SATURATION: 99 % | HEIGHT: 60 IN

## 2023-04-27 DIAGNOSIS — F41.1 GENERALIZED ANXIETY DISORDER: ICD-10-CM

## 2023-04-27 DIAGNOSIS — F33.1 MAJOR DEPRESSIVE DISORDER, RECURRENT, MODERATE: ICD-10-CM
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
## 2023-04-27 NOTE — DISCUSSION/SUMMARY
[Low acute suicide risk] : Low acute suicide risk [No] : No [FreeTextEntry1] : Pt is low acute risk for suicide due to strong social supports, no past attempts, and no current ideation.

## 2023-04-27 NOTE — PHYSICAL EXAM
[Walk Is Unsteady] : walk is unsteady [Well groomed] : well groomed [Slowed] : slowed [Cooperative] : cooperative [Depressed] : depressed [Full] : full [Clear] : clear [Linear/Goal Directed] : linear/goal directed [None] : none [None Reported] : none reported [Memory] : memory [Average] : average [WNL] : within normal limits [FreeTextEntry2] : uses walker, slow gait [FreeTextEntry3] : deferred [de-identified] : depressed

## 2023-04-27 NOTE — PSYCHOSOCIAL ASSESSMENT
[None known] : None known [Yes (select details below)] : Have you ever experienced this type of event? Yes [had nightmares about the event(s) or thought about the event(s) when you did not want] : did not have nightmares and/or unwanted thoughts about the events [tried hard not to think about the event(s) or went out of your way to avoid situations that reminded you of the event] : did not need to avoid thinking about events, did not need to avoid situations that might remind patient of events [has been constantly on guard, watchful, or easily startled] : has not been constantly on guard, watchful, or easily startled [felt numb or detached from people, activities, or your surrounding] : has not felt numb or detached from people, activities, or surroundings [felt guilty or unable to stop blaming yourself or others for the event(s) or any problems the event(s) may have caused] : has not felt guilty or unable to stop blaming self or others for event(s), or any problems the event(s) may have caused

## 2023-04-27 NOTE — ADDENDUM
[FreeTextEntry1] : pt arrived to clinic with her son, pt was assessed, pt is axo4, reports her mood is "okay". Active problems and medical history discussed with patient, pt endorses "back pain and stomach pain are very frustrating and anxiety provoking, It frustrates me when I have issues remembering simple things." Pt denies any other concerns at this time, pt vitals are stable.

## 2023-04-27 NOTE — END OF VISIT
[] : Resident [FreeTextEntry3] : I met virtually with Dr. Guo, pt and her son, and reviewed hx above.  Also discussed clinical formulation and treatment options with Dr. Her, and I agree with the plan above..

## 2023-04-27 NOTE — DISCUSSION/SUMMARY
[Yes] : Yes [No] : No [Yes: Details:___] : Yes: [unfilled] [Advised to schedule] : Advised to schedule [No, advised to schedule] : No, advised to schedule [Does patient use tobacco products?] : Patient does not use tobacco products [Does patient use medical marijuana?] : Patient does not use medical marijuana. [Patient has been tested for HIV?] : Patient has not been tested for HIV [Patient has been tested for Hepatitis?] : Patient has not been tested for hepatitis [Patient would like to be tested/re-tested?] : patient would not like to be tested/re-tested [Current or past COVID-19 diagnosis?] : Patient does not have a current or past COVID-19 diagnosis [Vaccinated?] : is vaccinated [FreeTextEntry5] : "chronic back pain and chronic epigastric pain, memory deficits" [FreeTextEntry8] : pt states symptoms are anxiety provoking
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
## 2023-04-28 LAB
ESTIMATED AVERAGE GLUCOSE: 126 MG/DL
FOLATE SERPL-MCNC: 5.3 NG/ML
HBA1C MFR BLD HPLC: 6 %
RPR SER-TITR: NORMAL
TSH SERPL-ACNC: 1.27 UIU/ML
VIT B12 SERPL-MCNC: 468 PG/ML

## 2023-04-29 ENCOUNTER — OUTPATIENT (OUTPATIENT)
Dept: OUTPATIENT SERVICES | Facility: HOSPITAL | Age: 53
LOS: 1 days | End: 2023-04-29
Payer: MEDICAID

## 2023-04-29 ENCOUNTER — RESULT REVIEW (OUTPATIENT)
Age: 53
End: 2023-04-29

## 2023-04-29 DIAGNOSIS — Z98.890 OTHER SPECIFIED POSTPROCEDURAL STATES: Chronic | ICD-10-CM

## 2023-04-29 DIAGNOSIS — Z00.8 ENCOUNTER FOR OTHER GENERAL EXAMINATION: ICD-10-CM

## 2023-04-29 DIAGNOSIS — R41.3 OTHER AMNESIA: ICD-10-CM

## 2023-04-29 PROCEDURE — 70551 MRI BRAIN STEM W/O DYE: CPT | Mod: 26

## 2023-04-29 PROCEDURE — 70551 MRI BRAIN STEM W/O DYE: CPT

## 2023-04-30 DIAGNOSIS — R41.3 OTHER AMNESIA: ICD-10-CM

## 2023-05-08 ENCOUNTER — OUTPATIENT (OUTPATIENT)
Dept: OUTPATIENT SERVICES | Facility: HOSPITAL | Age: 53
LOS: 1 days | End: 2023-05-08
Payer: MEDICAID

## 2023-05-08 ENCOUNTER — APPOINTMENT (OUTPATIENT)
Dept: PSYCHIATRY | Facility: CLINIC | Age: 53
End: 2023-05-08
Payer: MEDICAID

## 2023-05-08 DIAGNOSIS — Z98.890 OTHER SPECIFIED POSTPROCEDURAL STATES: Chronic | ICD-10-CM

## 2023-05-08 DIAGNOSIS — F33.1 MAJOR DEPRESSIVE DISORDER, RECURRENT, MODERATE: ICD-10-CM

## 2023-05-08 PROCEDURE — ZZZZZ: CPT | Mod: 1L

## 2023-05-08 PROCEDURE — 99213 OFFICE O/P EST LOW 20 MIN: CPT | Mod: 95

## 2023-05-08 RX ORDER — QUETIAPINE FUMARATE 25 MG/1
25 TABLET ORAL
Qty: 30 | Refills: 5 | Status: DISCONTINUED | COMMUNITY
Start: 2022-10-20 | End: 2023-05-08

## 2023-05-08 NOTE — REASON FOR VISIT
[Telehealth (audio & video) - Individual/Group] : This visit was provided via telehealth using real-time 2-way audio visual technology. [Medical Office: ([facility])___] : The provider was located at the medical office in [unfilled]. [Home] : The patient, [unfilled], was located at home, [unfilled], at the time of the visit. [Verbal consent obtained from patient/other participant(s)] : Verbal consent for telehealth/telephonic services obtained from patient/other participant(s) [Patient] : Patient [FreeTextEntry1] : Medication management
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
## 2023-05-08 NOTE — PHYSICAL EXAM
[Walk Is Unsteady] : walk is unsteady [Well groomed] : well groomed [Slowed] : slowed [Cooperative] : cooperative [Depressed] : depressed [Full] : full [Clear] : clear [Linear/Goal Directed] : linear/goal directed [None] : none [None Reported] : none reported [Memory] : memory [Average] : average [WNL] : within normal limits [FreeTextEntry2] : uses walker, slow gait [FreeTextEntry3] : deferred [de-identified] : depressed

## 2023-05-09 DIAGNOSIS — F33.1 MAJOR DEPRESSIVE DISORDER, RECURRENT, MODERATE: ICD-10-CM

## 2023-05-12 ENCOUNTER — NON-APPOINTMENT (OUTPATIENT)
Age: 53
End: 2023-05-12

## 2023-05-17 ENCOUNTER — NON-APPOINTMENT (OUTPATIENT)
Age: 53
End: 2023-05-17

## 2023-05-24 ENCOUNTER — OUTPATIENT (OUTPATIENT)
Dept: OUTPATIENT SERVICES | Facility: HOSPITAL | Age: 53
LOS: 1 days | End: 2023-05-24
Payer: MEDICAID

## 2023-05-24 ENCOUNTER — APPOINTMENT (OUTPATIENT)
Dept: PSYCHIATRY | Facility: CLINIC | Age: 53
End: 2023-05-24

## 2023-05-24 DIAGNOSIS — F41.1 GENERALIZED ANXIETY DISORDER: ICD-10-CM

## 2023-05-24 DIAGNOSIS — Z98.890 OTHER SPECIFIED POSTPROCEDURAL STATES: Chronic | ICD-10-CM

## 2023-05-24 DIAGNOSIS — F33.1 MAJOR DEPRESSIVE DISORDER, RECURRENT, MODERATE: ICD-10-CM

## 2023-05-24 PROCEDURE — 99213 OFFICE O/P EST LOW 20 MIN: CPT | Mod: 95

## 2023-05-24 NOTE — REASON FOR VISIT
[Telehealth (audio & video) - Individual/Group] : This visit was provided via telehealth using real-time 2-way audio visual technology. [Medical Office: ([facility])___] : The provider was located at the medical office in [unfilled]. [Home] : The patient, [unfilled], was located at home, [unfilled], at the time of the visit. [Verbal consent obtained from patient/other participant(s)] : Verbal consent for telehealth/telephonic services obtained from patient/other participant(s) [Patient] : Patient [FreeTextEntry1] : Medication management
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
## 2023-05-24 NOTE — PHYSICAL EXAM
[Walk Is Unsteady] : walk is unsteady [FreeTextEntry2] : uses walker, slow gait [FreeTextEntry3] : deferred [Well groomed] : well groomed [Slowed] : slowed [Cooperative] : cooperative [Depressed] : depressed [Full] : full [Clear] : clear [Linear/Goal Directed] : linear/goal directed [None] : none [None Reported] : none reported [Memory] : memory [Average] : average [WNL] : within normal limits [de-identified] : depressed

## 2023-05-25 DIAGNOSIS — F33.1 MAJOR DEPRESSIVE DISORDER, RECURRENT, MODERATE: ICD-10-CM

## 2023-05-25 DIAGNOSIS — F41.1 GENERALIZED ANXIETY DISORDER: ICD-10-CM

## 2023-05-26 ENCOUNTER — NON-APPOINTMENT (OUTPATIENT)
Age: 53
End: 2023-05-26

## 2023-06-01 ENCOUNTER — OUTPATIENT (OUTPATIENT)
Dept: OUTPATIENT SERVICES | Facility: HOSPITAL | Age: 53
LOS: 1 days | End: 2023-06-01
Payer: MEDICAID

## 2023-06-01 ENCOUNTER — NON-APPOINTMENT (OUTPATIENT)
Age: 53
End: 2023-06-01

## 2023-06-01 ENCOUNTER — APPOINTMENT (OUTPATIENT)
Dept: DERMATOLOGY | Facility: CLINIC | Age: 53
End: 2023-06-01

## 2023-06-01 DIAGNOSIS — Z98.890 OTHER SPECIFIED POSTPROCEDURAL STATES: Chronic | ICD-10-CM

## 2023-06-01 DIAGNOSIS — Z00.00 ENCOUNTER FOR GENERAL ADULT MEDICAL EXAMINATION WITHOUT ABNORMAL FINDINGS: ICD-10-CM

## 2023-06-01 DIAGNOSIS — L65.9 NONSCARRING HAIR LOSS, UNSPECIFIED: ICD-10-CM

## 2023-06-01 PROCEDURE — 99203 OFFICE O/P NEW LOW 30 MIN: CPT

## 2023-06-01 NOTE — ASSESSMENT
[FreeTextEntry1] : Seborrheic keratosis\par - benign/ OD/ monitor\par \par Hair loss/ Alopecia with component of Traction and Androgenetic\par - counseled patient on reducing hair traction by not wearing hair tightly pulled back\par - Consider changing shampoo to Head and Shoulder shampoo tiw\par - vitamin d supplementation with MVI once a day until he sees the PMD\par \par return to clinic in 3 months or as needed

## 2023-06-01 NOTE — PHYSICAL EXAM
[Alert] : alert [Oriented x 3] : ~L oriented x 3 [Well Nourished] : well nourished [No Clubbing] : no clubbing [No Edema] : no edema [FreeTextEntry3] : Hyperpigmented macules and papules on b/l lateral forehead with regualr colors and borders. \par Hyperpigmented flat macules and papules on distal left forearm with no dermoscopic abnormalities. \par Hair pull test negative. Decreased hair density frontal.

## 2023-06-01 NOTE — HISTORY OF PRESENT ILLNESS
[FreeTextEntry1] : skin lesions [de-identified] : 52 yo F w/ pmhx of preDM, GERD, asthma presents to derm clinic for follow-up for lesions on skin on face on checks and forehead as well as on b/l hands.\par \par She has noticed this for the past 5 years. The lesions have no grown in size and she does not get much sun exposure.\par \par Also complaining of generalized hair loss over past 3 months. Has h/o low vitamine D, not taking vitamins now. Lost hair in the past after Covid but grew back. TSH and CBC wnl. \par Denies major stresses, hospitalizations, new medications, fevers in this period. Has tight pony tail.

## 2023-06-02 DIAGNOSIS — L82.1 OTHER SEBORRHEIC KERATOSIS: ICD-10-CM

## 2023-06-02 DIAGNOSIS — L65.9 NONSCARRING HAIR LOSS, UNSPECIFIED: ICD-10-CM

## 2023-06-05 ENCOUNTER — NON-APPOINTMENT (OUTPATIENT)
Age: 53
End: 2023-06-05

## 2023-06-06 ENCOUNTER — OUTPATIENT (OUTPATIENT)
Dept: OUTPATIENT SERVICES | Facility: HOSPITAL | Age: 53
LOS: 1 days | End: 2023-06-06
Payer: MEDICAID

## 2023-06-06 ENCOUNTER — APPOINTMENT (OUTPATIENT)
Dept: PSYCHIATRY | Facility: CLINIC | Age: 53
End: 2023-06-06

## 2023-06-06 DIAGNOSIS — Z98.890 OTHER SPECIFIED POSTPROCEDURAL STATES: Chronic | ICD-10-CM

## 2023-06-06 DIAGNOSIS — F41.9 ANXIETY DISORDER, UNSPECIFIED: ICD-10-CM

## 2023-06-06 PROCEDURE — 90791 PSYCH DIAGNOSTIC EVALUATION: CPT

## 2023-06-06 NOTE — HISTORY OF PRESENT ILLNESS
[FreeTextEntry1] : Patient is a 52 yo female with PMH of chronic neck pain with daily Percocet use and steroid injection, and "stimulating wires" in her back and concern for forgetfulness presented to office with continued memory concerns. \par \par Per social work, patient was advised to come in before formal Psychiatric evaluation. Patient saw a psychiatrist multiple years ago but stopped due to improvement. Patient reported that her steroid injections, which cause headaches and worsening neck pain. Patient had 3 injections in the right shoulder, given once every 2 weeks. Patient reported that she does not leave the house much secondary to pain. Patient enjoys music and television. Patient reports that her memory comes and goes. Patient reports forgetting appointments, forgetting to turn off the stove, running water, forgetting wallet when shopping. Patient reports limited appetite and recent poor sleep secondary to sleep. Patient does report feeling sad and wants to follow up with Psychiatry. Patient denied any auditory/visual hallucinations or any suicidal or homicidal ideation.Patient reported that she has been forgetful for the last 4-5 years, reporting to have trouble remembering big and small stuff. Patient does have trouble with long term episodic memory as well as attention/concentration. [Not Applicable] : Not applicable

## 2023-06-06 NOTE — REVIEW OF SYSTEMS
[Memory Lapses or Loss] : memory loss [Dizziness] : dizziness [Difficulty Walking] : difficulty walking [Sleep Disturbances] : sleep disturbances [Depression] : depression [Eye Pain] : eye pain [Suicidal] : not suicidal [Chest Pain] : no chest pain [Negative] : Allergic/Immunologic

## 2023-06-06 NOTE — HEALTH RISK ASSESSMENT
[Patient/Caregiver unclear of wishes] : , patient/caregiver unclear of wishes [No, patient unwilling or unclear about wishes] : No, patient unwilling or unclear about wishes
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
## 2023-06-06 NOTE — PHYSICAL EXAM
[FreeTextEntry1] : Mental Status: alert\par Orientation: oriented to person, oriented to place and oriented to time but not date. Does not recall current President\par Attention: decreased concentrating ability \par Language: no difficulty naming common objects, noted difficulty repeating a phrase, no difficulty writing a sentence, fluency intact, comprehension intact and reading intact. \par Fund of knowledge: displays adequate knowledge of personal past history recent but remote episodic memory affected\par Cranial Nerves: visual acuity intact bilaterally, visual fields full to confrontation, pupils equal round and reactive to light, extraocular motion intact, facial sensation intact symmetrically, face symmetrical, hearing was intact bilaterally, tongue and palate midline, head turning and shoulder shrug symmetric and there was no tongue deviation with protrusion. \par Motor: muscle tone was normal in all four extremities, strength 4/5 in right extremity and bilateral lower extremities though limited by pain\par Sensory exam: light touch was intact but patient reported mildly different sensation on the left and right sides\par Coordination:. Finger to nose intact but limited on right hand secondary to pain. No tremor present. \par Deep tendon reflexes: \par Biceps right 2+. Biceps left 2+.  \par Triceps right 2+. Triceps left 2+.  \par Brachioradialis right 2+. Brachioradialis left 2+.  \par Patella right 2+. Patella left 2+.  \par Ankle jerk right 2+. Ankle jerk left 2+.  [Walk Is Unsteady] : walk is unsteady [Well groomed] : well groomed [Slowed] : slowed [Cooperative] : cooperative [Depressed] : depressed [Full] : full [Clear] : clear [Linear/Goal Directed] : linear/goal directed [None] : none [None Reported] : none reported [Memory] : memory [Average] : average [WNL] : within normal limits

## 2023-06-06 NOTE — DISCUSSION/SUMMARY
[Low acute suicide risk] : Low acute suicide risk [No] : No [Not clinically indicated] : Safety Plan completed/updated (for individuals at risk): Not clinically indicated

## 2023-06-07 DIAGNOSIS — F41.9 ANXIETY DISORDER, UNSPECIFIED: ICD-10-CM

## 2023-06-20 ENCOUNTER — APPOINTMENT (OUTPATIENT)
Dept: PSYCHIATRY | Facility: CLINIC | Age: 53
End: 2023-06-20

## 2023-06-20 ENCOUNTER — APPOINTMENT (OUTPATIENT)
Dept: PSYCHIATRY | Facility: CLINIC | Age: 53
End: 2023-06-20
Payer: MEDICAID

## 2023-06-20 ENCOUNTER — OUTPATIENT (OUTPATIENT)
Dept: OUTPATIENT SERVICES | Facility: HOSPITAL | Age: 53
LOS: 1 days | End: 2023-06-20

## 2023-06-20 ENCOUNTER — OUTPATIENT (OUTPATIENT)
Dept: OUTPATIENT SERVICES | Facility: HOSPITAL | Age: 53
LOS: 1 days | End: 2023-06-20
Payer: MEDICAID

## 2023-06-20 DIAGNOSIS — F41.1 GENERALIZED ANXIETY DISORDER: ICD-10-CM

## 2023-06-20 DIAGNOSIS — Z98.890 OTHER SPECIFIED POSTPROCEDURAL STATES: Chronic | ICD-10-CM

## 2023-06-20 DIAGNOSIS — F33.1 MAJOR DEPRESSIVE DISORDER, RECURRENT, MODERATE: ICD-10-CM

## 2023-06-20 DIAGNOSIS — F41.9 ANXIETY DISORDER, UNSPECIFIED: ICD-10-CM

## 2023-06-20 PROCEDURE — ZZZZZ: CPT

## 2023-06-20 PROCEDURE — 99213 OFFICE O/P EST LOW 20 MIN: CPT | Mod: 25,95

## 2023-06-20 NOTE — PLAN
[FreeTextEntry2] : \par Goal #1: Manage physical healthcare conditions and cope with related stress \par Goal #1 Objective #1: Take medications/treatments as prescribed on a daily basis \par Goal #1 Objective #2: Maintain good overall physical health and healthcare practices such as doing light exercises from chair every morning, and going out using her walker 2-3 times a week   [Cognitive and/or Behavior Therapy] : Cognitive and/or Behavior Therapy  [Motivational Interviewing] : Motivational Interviewing  [Supportive Therapy] : Supportive Therapy [de-identified] : The patient was [facility] initially saw Dr Guo for psych evaluation and recently assigned for therapy to this writer. Initial Social Work intake completed by writer on 6/6/23. Patient has physical limitations and spends most of her time at home. Patient is in constant back pain and reports forgetfulness, as a result being depressed and having cry spells. Patient states her goal is to get better so that eventually she can go back to work, patient feels she is a burden to her family caring for her. Patients goal for the next year is to follow up with her care providers, take medications as prescribed and maintain physical health such as doing light exercise while sitting in a chair, going out for short walks and stretching while using her walker and start journaling her day-to-day feelings and activities and maintain positive self-talk. Supportive therapy provided during session, goals discussed and patient will continue to be seen bi weekly for therapy. Patient denies S/H/I or self-harming behaviors.  [Recommended Frequency of Visits: ____] : Recommended frequency of visits: [unfilled] [FreeTextEntry1] : Bi weekly sessions.

## 2023-06-20 NOTE — PHYSICAL EXAM
[Walk Is Unsteady] : walk is unsteady [Well groomed] : well groomed [Slowed] : slowed [Cooperative] : cooperative [Depressed] : depressed [Full] : full [Clear] : clear [Linear/Goal Directed] : linear/goal directed [None] : none [None Reported] : none reported [Memory] : memory [Average] : average [WNL] : within normal limits [FreeTextEntry2] : uses walker, slow gait [FreeTextEntry3] : deferred [de-identified] : depressed

## 2023-06-20 NOTE — REASON FOR VISIT
[Patient preference] : as per patient preference [Telehealth (audio & video) - Individual/Group] : This visit was provided via telehealth using real-time 2-way audio visual technology. [Medical Office: ([facility])___] : The provider was located at the medical office in [unfilled]. [Home] : The patient, [unfilled], was located at home, [unfilled], at the time of the visit. [Verbal consent obtained from patient/other participant(s)] : Verbal consent for telehealth/telephonic services obtained from patient/other participant(s) [FreeTextEntry4] : 1:50pm [FreeTextEntry5] : 2:20pm [FreeTextEntry2] : 6/6/23 [Patient] : Patient [FreeTextEntry1] : Anxiety/Depression/ therapy session

## 2023-06-20 NOTE — PHYSICAL EXAM
[Cooperative] : cooperative [Anxious] : anxious [Full] : full [Clear] : clear [Linear/Goal Directed] : linear/goal directed [Memory] : memory [Average] : average [WNL] : within normal limits

## 2023-06-20 NOTE — REASON FOR VISIT
[Patient preference] : as per patient preference [Telehealth (audio & video) - Individual/Group] : This visit was provided via telehealth using real-time 2-way audio visual technology. [Medical Office: ([facility])___] : The provider was located at the medical office in [unfilled]. [Home] : The patient, [unfilled], was located at home, [unfilled], at the time of the visit. [Verbal consent obtained from patient/other participant(s)] : Verbal consent for telehealth/telephonic services obtained from patient/other participant(s) [FreeTextEntry4] : 1:50pm [FreeTextEntry5] : 2:20pm [FreeTextEntry2] : 6/6/23 [Patient] : Patient [FreeTextEntry1] : Anxiety/Depression/ therapy session

## 2023-06-20 NOTE — PLAN
[FreeTextEntry2] : \par Goal #1: Manage physical healthcare conditions and cope with related stress \par Goal #1 Objective #1: Take medications/treatments as prescribed on a daily basis \par Goal #1 Objective #2: Maintain good overall physical health and healthcare practices such as doing light exercises from chair every morning, and going out using her walker 2-3 times a week   [Cognitive and/or Behavior Therapy] : Cognitive and/or Behavior Therapy  [Motivational Interviewing] : Motivational Interviewing  [Supportive Therapy] : Supportive Therapy [de-identified] : The patient was [facility] initially saw Dr Guo for psych evaluation and recently assigned for therapy to this writer. Initial Social Work intake completed by writer on 6/6/23. Patient has physical limitations and spends most of her time at home. Patient is in constant back pain and reports forgetfulness, as a result being depressed and having cry spells. Patient states her goal is to get better so that eventually she can go back to work, patient feels she is a burden to her family caring for her. Patients goal for the next year is to follow up with her care providers, take medications as prescribed and maintain physical health such as doing light exercise while sitting in a chair, going out for short walks and stretching while using her walker and start journaling her day-to-day feelings and activities and maintain positive self-talk. Supportive therapy provided during session, goals discussed and patient will continue to be seen bi weekly for therapy. Patient denies S/H/I or self-harming behaviors.  [Recommended Frequency of Visits: ____] : Recommended frequency of visits: [unfilled] [FreeTextEntry1] : Bi weekly sessions.

## 2023-06-20 NOTE — REASON FOR VISIT
[Telehealth (audio & video) - Individual/Group] : This visit was provided via telehealth using real-time 2-way audio visual technology. [Medical Office: ([facility])___] : The provider was located at the medical office in [unfilled]. [Home] : The patient, [unfilled], was located at home, [unfilled], at the time of the visit. [Verbal consent obtained from patient/other participant(s)] : Verbal consent for telehealth/telephonic services obtained from patient/other participant(s) [Patient] : Patient [FreeTextEntry1] : Medication management
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
## 2023-06-21 DIAGNOSIS — F41.1 GENERALIZED ANXIETY DISORDER: ICD-10-CM

## 2023-06-21 DIAGNOSIS — F33.1 MAJOR DEPRESSIVE DISORDER, RECURRENT, MODERATE: ICD-10-CM

## 2023-06-21 DIAGNOSIS — F41.9 ANXIETY DISORDER, UNSPECIFIED: ICD-10-CM
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
## 2023-06-22 NOTE — PSYCHOSOCIAL ASSESSMENT
[had nightmares about the event(s) or thought about the event(s) when you did not want] : did not have nightmares and/or unwanted thoughts about the events [tried hard not to think about the event(s) or went out of your way to avoid situations that reminded you of the event] : did not need to avoid thinking about events, did not need to avoid situations that might remind patient of events [has been constantly on guard, watchful, or easily startled] : has not been constantly on guard, watchful, or easily startled [felt numb or detached from people, activities, or your surrounding] : has not felt numb or detached from people, activities, or surroundings [felt guilty or unable to stop blaming yourself or others for the event(s) or any problems the event(s) may have caused] : has not felt guilty or unable to stop blaming self or others for event(s), or any problems the event(s) may have caused [FreeTextEntry7] : Roni Guevara [phone]

## 2023-06-22 NOTE — SOCIAL HISTORY
Reports possible esophageal foreign body after eating chop piedad @ 1700. Pt states she is able to swallow secretions but it \"comes back up right away\". Sees Dr. William VELASCO   [FreeTextEntry1] : Employment history: Unemployed.\par Developmental history: No\par Sexual hx/identity Sexual History/ Concern (include sexual orientation and other relevant information)  : Heterosexual.\par Race - ethnicity - culture information : [place]\par Social supports (friends, Volunteers, club, AA meeting, other meetings ) ? : no\par Meaningful Activities: no\par \par \par Spiritual Assessment Tool - FICA\par F. What is your kayla or belief? "Adventist"\par Do you consider yourself spiritual or Roman Catholic? "Roman Catholic"\par Is there something you believe in that gives meaning to your life? "god and Antonio"\par I: Is it important in your life? "yes"\par What influence does it have on how you take care of yourself? "it helps when i pray"\par How have your beliefs influenced your behavior during this illness? What role do your beliefs play in regaining your health? "helps"\par C. Are you part of a spiritual or Roman Catholic community? "sometimes because of my physical limitations"\par Is this of support to you and how? "yes" \par Is there a person or group of people you really love or who are really important to you? "my family"\par H. We have been discussing your belief and supports. What else gives you internal support?" my family"\par What are your sources of hope, strength, comfort and peace? "from god"\par What do you hold on to during difficult times? "praying"\par what sustains you and keeps you going? "my family, kayla"\par A. How would you like me, your healthcare provider, to address these issues in your healthcare? "i don’t want to cry, i want to learn to cope with my physical limitations"\par \par  \par \par \par \par  \par \par \par

## 2023-06-22 NOTE — DISCUSSION/SUMMARY
[FreeTextEntry1] : Pt is low acute risk for suicide due to strong social supports, no past attempts, and no current ideation.

## 2023-06-22 NOTE — FAMILY HISTORY
[FreeTextEntry1] : Family composition: Kirsty is 52 y/o F , she has 5 kids three sons and two daughters; she does not recall their ages and she didn’t remember how old she is. Unemployed, and domiciled with her 2 adult kids.\par Family history and background: [place] in [place] years, born and raised in [place]. \par Family relationship:Good.\par Pertinent Family Medical, MH and Substance Use History including Adult Child of Alcoholic and child of substance abuse status; history of cancer and heart disease:\par Father passed away\par Mother passed away\par 5 adult children: healthy\par \par
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
## 2023-06-22 NOTE — REASON FOR VISIT
[Primary Care] : Primary Care [[facility] Provider/Facility] : [facility] Provider/Facility [Patient] : Patient [FreeTextEntry4] : 1pm [FreeTextEntry5] : English [FreeTextEntry6] : Marta [FreeTextEntry7] : She [FreeTextEntry1] : Depression/Anxiety

## 2023-07-06 ENCOUNTER — APPOINTMENT (OUTPATIENT)
Dept: PSYCHIATRY | Facility: CLINIC | Age: 53
End: 2023-07-06

## 2023-07-06 ENCOUNTER — OUTPATIENT (OUTPATIENT)
Dept: OUTPATIENT SERVICES | Facility: HOSPITAL | Age: 53
LOS: 1 days | End: 2023-07-06
Payer: MEDICAID

## 2023-07-06 DIAGNOSIS — F41.9 ANXIETY DISORDER, UNSPECIFIED: ICD-10-CM

## 2023-07-06 DIAGNOSIS — Z98.890 OTHER SPECIFIED POSTPROCEDURAL STATES: Chronic | ICD-10-CM

## 2023-07-06 PROCEDURE — 90832 PSYTX W PT 30 MINUTES: CPT

## 2023-07-06 NOTE — PLAN
[FreeTextEntry2] : \par Goal #1: Manage physical healthcare conditions and cope with related stress \par Goal #1 Objective #1: Take medications/treatments as prescribed on a daily basis \par Goal #1 Objective #2: Maintain good overall physical health and healthcare practices such as doing light exercises from chair every morning, and going out using her walker 2-3 times a week   [Cognitive and/or Behavior Therapy] : Cognitive and/or Behavior Therapy  [Motivational Interviewing] : Motivational Interviewing  [Supportive Therapy] : Supportive Therapy [de-identified] : Patient and the writer met via telehealth. Patient was thankful she was given this option considering her physical limitations. Patient saw her pain management doctor in [place] on 6/30/23 and got her pain medication refills. Patient has physical limitations and spends most of her time at home. Patient is in constant back pain and reports forgetfulness, as a result being depressed and having cry spells. Patient is following up with neurology regarding her short term memory and has a second appointment on September. Patient states her goal is to get better so that eventually she can go back to work, patient feels she is a burden to her family caring for her.Patients son is her HHA daily which she finds it very useful and states he takes really good care of her. Supportive therapy provided during session, goals discussed and patient will continue to be seen bi weekly for therapy. Motivational interviewing used during session, patient will continue to  take medications as prescribed and maintain physical health such as doing light exercise while sitting in a chair, going out for short walks and stretching while using her walker and start journaling her day-to-day feelings and activities and maintain positive self-talk Patient denies S/H/I or self-harming behaviors.  [Recommended Frequency of Visits: ____] : Recommended frequency of visits: [unfilled] [FreeTextEntry1] : Bi weekly sessions.

## 2023-07-06 NOTE — REASON FOR VISIT
[Patient preference] : as per patient preference [Telehealth (audio & video) - Individual/Group] : This visit was provided via telehealth using real-time 2-way audio visual technology. [Medical Office: ([facility])___] : The provider was located at the medical office in [unfilled]. [Home] : The patient, [unfilled], was located at home, [unfilled], at the time of the visit. [Verbal consent obtained from patient/other participant(s)] : Verbal consent for telehealth/telephonic services obtained from patient/other participant(s) [FreeTextEntry4] : 3pm [FreeTextEntry5] : 3:30pm [FreeTextEntry2] : 6/6/23 [Patient] : Patient [FreeTextEntry1] : Anxiety/Depression/ therapy session

## 2023-07-07 DIAGNOSIS — F41.9 ANXIETY DISORDER, UNSPECIFIED: ICD-10-CM

## 2023-07-07 NOTE — DISCUSSION/SUMMARY
[Initial Plan] : Initial Plan [Adherent to treatment recommendations] : adherent to treatment recommendations [Insightful] : insightful [Motivated to participate in treatment] : motivated to participate in treatment [Part of a supportive family] : part of a supportive family [Involved in cultural/spiritual/Bahai/community activities] : involved in cultural/spiritual/Bahai/community activities [English fluency] : English fluency [Connected to healthcare] : connected to healthcare [Access to safe outdoor spaces] : access to safe outdoor spaces [Mental Health] : Mental Health [Physical Health] : Physical Health [Initial] : Initial [every ___ weeks] : every [unfilled] weeks [Treatment is no longer medically necessary as evidenced by:] : Treatment is no longer medically necessary as evidenced by: [None - Reason others did not participate:] : None - Reason others did not participate:  [Yes] : Yes [Psychiatric Provider/Prescriber] : Psychiatric Provider/Prescriber [Supervisor (if needed)] : Supervisor [Potential impact of patient’s physical health conditions on psychiatric care?] : Potential impact of patient’s physical health conditions on psychiatric care: Yes [FreeTextEntry1] : 6/20/23 [FreeTextEntry2] : 6/20/24 [FreeTextEntry3] : 04/26/2023 [FreeTextEntry4] : "i want to be better, i don't want to depend on other people" [de-identified] : Take medications/treatments as prescribed on a daily basis  [de-identified] : 6/20/2024 [de-identified] : Maintain good overall physical health and healthcare practices such as doing light excersises from chair every morning, and going out using her walker 2-3 times a week  [de-identified] : 6/20/2024 [FreeTextEntry5] : 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 Patient has physical limitations and spends most of her time at home. Patient is in constant back pain and reports forgetfulness, as a result being depressed and having cry spells. Patient states her goal is to get better so that eventually she can go back to work, patient feels she is a burden to her family caring for her. Patients goal for the next year is to follow up with her care providers, take medications as prescribed and maintain physical health such as doing light exercise while sitting in a chair, going out for short walks and stretching while using her walker and start journaling her day to day feelings and activities and maintain positive self talk. Patient denies S/H/I or self harming behaviors.  [de-identified] : Treatment is no longer medically necessary as evidenced by: The treatment is no longer medically necessary when the patient no longer requires individual psychotherapy and/or medication to perform at baseline. [de-identified] : patient declined. [Does patient require any additional health services or referrals?] : Does patient require any additional health services or referrals: No

## 2023-07-07 NOTE — DISCUSSION/SUMMARY
[Initial Plan] : Initial Plan [Adherent to treatment recommendations] : adherent to treatment recommendations [Insightful] : insightful [Motivated to participate in treatment] : motivated to participate in treatment [Part of a supportive family] : part of a supportive family [Involved in cultural/spiritual/Yazidism/community activities] : involved in cultural/spiritual/Yazidism/community activities [English fluency] : English fluency [Connected to healthcare] : connected to healthcare [Access to safe outdoor spaces] : access to safe outdoor spaces [Mental Health] : Mental Health [Physical Health] : Physical Health [Initial] : Initial [every ___ weeks] : every [unfilled] weeks [Treatment is no longer medically necessary as evidenced by:] : Treatment is no longer medically necessary as evidenced by: [None - Reason others did not participate:] : None - Reason others did not participate:  [Yes] : Yes [Psychiatric Provider/Prescriber] : Psychiatric Provider/Prescriber [Supervisor (if needed)] : Supervisor [Potential impact of patient’s physical health conditions on psychiatric care?] : Potential impact of patient’s physical health conditions on psychiatric care: Yes [FreeTextEntry1] : 6/20/23 [FreeTextEntry2] : 6/20/24 [FreeTextEntry3] : 04/26/2023 [FreeTextEntry4] : "i want to be better, i don't want to depend on other people" [de-identified] : Take medications/treatments as prescribed on a daily basis  [de-identified] : 6/20/2024 [de-identified] : Maintain good overall physical health and healthcare practices such as doing light excersises from chair every morning, and going out using her walker 2-3 times a week  [de-identified] : 6/20/2024 [FreeTextEntry5] : 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 Patient has physical limitations and spends most of her time at home. Patient is in constant back pain and reports forgetfulness, as a result being depressed and having cry spells. Patient states her goal is to get better so that eventually she can go back to work, patient feels she is a burden to her family caring for her. Patients goal for the next year is to follow up with her care providers, take medications as prescribed and maintain physical health such as doing light exercise while sitting in a chair, going out for short walks and stretching while using her walker and start journaling her day to day feelings and activities and maintain positive self talk. Patient denies S/H/I or self harming behaviors.  [de-identified] : Treatment is no longer medically necessary as evidenced by: The treatment is no longer medically necessary when the patient no longer requires individual psychotherapy and/or medication to perform at baseline. [de-identified] : patient declined. [Does patient require any additional health services or referrals?] : Does patient require any additional health services or referrals: No

## 2023-07-13 ENCOUNTER — APPOINTMENT (OUTPATIENT)
Dept: NEUROSURGERY | Facility: CLINIC | Age: 53
End: 2023-07-13
Payer: MEDICAID

## 2023-07-13 VITALS — BODY MASS INDEX: 30.04 KG/M2 | HEIGHT: 60 IN | WEIGHT: 153 LBS

## 2023-07-13 DIAGNOSIS — M25.511 PAIN IN RIGHT SHOULDER: ICD-10-CM

## 2023-07-13 DIAGNOSIS — M47.812 SPONDYLOSIS W/OUT MYELOPATHY OR RADICULOPATHY, CERVICAL REGION: ICD-10-CM

## 2023-07-13 PROCEDURE — 99213 OFFICE O/P EST LOW 20 MIN: CPT

## 2023-07-14 PROBLEM — M25.511 SHOULDER PAIN, RIGHT: Status: ACTIVE | Noted: 2023-07-13

## 2023-07-14 PROBLEM — M47.812 CERVICAL SPONDYLOSIS: Status: ACTIVE | Noted: 2020-01-02

## 2023-07-14 NOTE — HISTORY OF PRESENT ILLNESS
[FreeTextEntry1] : Ms. Guevara h/o perc SCS,  C3-4, C4-5 ACDF and L5-S1 fusion by Dr. Strange  is here for a follow up neurosurgical clinic visit. Today she continues to persistent right sided neck pain, stabbing pain in the right scapular and right shoulder pain. Reports of pins/needles and pressure in the neck.\par Ms. Guevara states the  is helping her find a PT facility that will take her insurance. \par She goes to a pain management provider in [place] ( does not recall provider name) who gave her cortisone shot in the right shoulder 2 months ago, and 4 months ago had FAWN which did helped. \par As per the patient, her PCP, recommends medicinal marijuana so she may be wean off the Percocet which patient is in agreement, though she reports she lost the script, would like a new one. \par \par \par Ms. Guevara has reasonable relief of her back and leg pain with her Medtronic SCS device.\par \par MRI of the cervical spine at [facility] in 2022 showed Overall unchanged multilevel degenerative changes most significant at the C4-C5 level resulting in moderate spinal canal stenosis as well as bilateral neuroforaminal narrowing\par \par

## 2023-07-20 ENCOUNTER — APPOINTMENT (OUTPATIENT)
Dept: PSYCHIATRY | Facility: CLINIC | Age: 53
End: 2023-07-20

## 2023-07-20 ENCOUNTER — OUTPATIENT (OUTPATIENT)
Dept: OUTPATIENT SERVICES | Facility: HOSPITAL | Age: 53
LOS: 1 days | End: 2023-07-20
Payer: MEDICAID

## 2023-07-20 DIAGNOSIS — Z98.890 OTHER SPECIFIED POSTPROCEDURAL STATES: Chronic | ICD-10-CM

## 2023-07-20 DIAGNOSIS — F41.9 ANXIETY DISORDER, UNSPECIFIED: ICD-10-CM

## 2023-07-20 PROCEDURE — 90832 PSYTX W PT 30 MINUTES: CPT | Mod: 95

## 2023-07-20 NOTE — PLAN
[FreeTextEntry2] : \par Goal #1: Manage physical healthcare conditions and cope with related stress \par Goal #1 Objective #1: Take medications/treatments as prescribed on a daily basis \par Goal #1 Objective #2: Maintain good overall physical health and healthcare practices such as doing light exercises from chair every morning, and going out using her walker 2-3 times a week   [Cognitive and/or Behavior Therapy] : Cognitive and/or Behavior Therapy  [Motivational Interviewing] : Motivational Interviewing  [Supportive Therapy] : Supportive Therapy [de-identified] : Patient and the writer met via telehealth. Patient saw her neurosurgeon who suggested she switches Percocet to medical marijuana, she wrote a prescription for it and paint will present it to her pain management doctor in [place] when she sees him next week Friday. Patient is trying to get rid of the Percocet altogether because her doctors and herself believe that she is having forgetfulness from it.  Patient has physical limitations and spends most of her time at home. Patient is in constant back pain and reports forgetfulness, as a result being depressed and having cry spells. Patient is following up with neurology regarding her short term memory and has a second appointment on September. Patient states her goal is to get better so that eventually she can go back to work, patient feels she is a burden to her family caring for her.Patients son is her HHA daily which she finds it very useful and states he takes really good care of her. Overall patient reports stable mood since last session, she is making sure to follow up with all her providers and takes her medications as prescribed. She states that she was finally able to find a physical therapist that takes straight medicaid and has been approved for 10 sessions. Patient begins physical therapy tomorrow. Supportive therapy provided during session, goals discussed and patient will continue to be seen bi weekly for therapy. Motivational interviewing used during session, patient will continue to  take medications as prescribed and maintain physical health such as doing light exercise while sitting in a chair, going out for short walks and stretching while using her walker and start journaling her day-to-day feelings and activities and maintain positive self-talk Patient denies S/H/I or self-harming behaviors.  [Recommended Frequency of Visits: ____] : Recommended frequency of visits: [unfilled] [FreeTextEntry1] : Bi weekly sessions.

## 2023-07-20 NOTE — REASON FOR VISIT
[Patient preference] : as per patient preference [Telehealth (audio & video) - Individual/Group] : This visit was provided via telehealth using real-time 2-way audio visual technology. [Medical Office: ([facility])___] : The provider was located at the medical office in [unfilled]. [Home] : The patient, [unfilled], was located at home, [unfilled], at the time of the visit. [Verbal consent obtained from patient/other participant(s)] : Verbal consent for telehealth/telephonic services obtained from patient/other participant(s) [FreeTextEntry4] : 3pm [FreeTextEntry5] : 3:30pm [FreeTextEntry2] : 6/6/23 [Patient] : Patient [FreeTextEntry1] : Anxiety/Depression/ therapy session

## 2023-07-21 DIAGNOSIS — F41.9 ANXIETY DISORDER, UNSPECIFIED: ICD-10-CM

## 2023-07-27 ENCOUNTER — OUTPATIENT (OUTPATIENT)
Dept: OUTPATIENT SERVICES | Facility: HOSPITAL | Age: 53
LOS: 1 days | End: 2023-07-27
Payer: MEDICAID

## 2023-07-27 ENCOUNTER — RESULT REVIEW (OUTPATIENT)
Age: 53
End: 2023-07-27

## 2023-07-27 DIAGNOSIS — Z98.890 OTHER SPECIFIED POSTPROCEDURAL STATES: Chronic | ICD-10-CM

## 2023-07-27 DIAGNOSIS — M25.511 PAIN IN RIGHT SHOULDER: ICD-10-CM

## 2023-07-27 PROCEDURE — 73030 X-RAY EXAM OF SHOULDER: CPT | Mod: 26,RT

## 2023-07-27 PROCEDURE — 73030 X-RAY EXAM OF SHOULDER: CPT | Mod: RT

## 2023-07-28 DIAGNOSIS — M25.511 PAIN IN RIGHT SHOULDER: ICD-10-CM

## 2023-08-04 ENCOUNTER — APPOINTMENT (OUTPATIENT)
Dept: PSYCHIATRY | Facility: CLINIC | Age: 53
End: 2023-08-04
Payer: MEDICAID

## 2023-08-04 ENCOUNTER — OUTPATIENT (OUTPATIENT)
Dept: OUTPATIENT SERVICES | Facility: HOSPITAL | Age: 53
LOS: 1 days | End: 2023-08-04
Payer: MEDICAID

## 2023-08-04 DIAGNOSIS — F41.9 ANXIETY DISORDER, UNSPECIFIED: ICD-10-CM

## 2023-08-04 DIAGNOSIS — Z98.890 OTHER SPECIFIED POSTPROCEDURAL STATES: Chronic | ICD-10-CM

## 2023-08-04 PROCEDURE — ZZZZZ: CPT

## 2023-08-04 PROCEDURE — 99213 OFFICE O/P EST LOW 20 MIN: CPT

## 2023-08-04 NOTE — HISTORY OF PRESENT ILLNESS
[FreeTextEntry1] : Marta is a 53-year-old [place] female,  with 5 children, unemployed, living with 2 of her adult children in [place], [place].  Marta has PPHx of MDD, KODY and PTSD and is currently on Paroxetine 30 mg daily, Gabapentin 800 mg TID, and Mirtazapine 7.5 mg QHS.  She also has PMHx of chronic back, neck, and shoulder pain (s/p surgery x3, managed by a specialist), as well as asthma.  Her son, Roni, functions as a HHA through [facility] and provides her assistance with all ADLs/IADLs.  Marta presents today accompanied by her son for in-person follow-up appointment to meet new provider for initial encounter.    At last appointment, Marta reported that her depression was ongoing, which she attributes primarily to her chronic pain.  Paroxetine was optimized from 20 to 30 mg without noticeable benefit.  She informs she saw her pain management doctor on 8/2/23 who administered a series of injections for analgesia; however, she denies any relief at this time.  Today on assessment, Kirsty presents in a great deal of discomfort and is witnessed ambulating cautiously via walker with assistance from her son.  She winces in pain as she enters the exam room and speaks in a soft tone throughout the course of interview.  She denies any changes to her mood or level of pain since cross-tapering from Cymbalta 90 mg daily to Paroxetine 30 mg daily.  No medication adverse effects were reported after further optimizing Paroxetine to 30 mg (as nausea was previously endorsed).  Marta rates her baseline mood 1/10 and informs she is feeling depressed as a result of her physical state.  She also endorses ongoing anhedonia (no longer enjoying spending time with friends or painting) and low energy.  Sleep has been improved since initiation of Remeron; however, she c/o middle insomnia 2/2 pain.  Appetite remains intact.  Despite her physical condition, Marta remains future-oriented and denies the presence of SI with intent or plan.  No overt manic or psychotic symptomatology present.

## 2023-08-04 NOTE — PLAN
[Medication education provided] : Medication education provided. [FreeTextEntry5] :  RTC 1 Month -continue follow-up with pain management specialist -c/w Paroxetine 30 mg daily (will not further optimize due to h/o previous AE.  Depressive symptoms are contingent on physical pain) -c/w Gabapentin 800 mg TID -c/w Mirtazapine 7.5 mg QHS  Medication side effect profile was reviewed and discussed.  Patient was informed of the increased risk of dizziness and falls when combining Gabapentin with opioid analgesic medications.  She was also advised that Gabapentin can cause dose-dependent CNS depression and/or drowsiness when combined with opioid analgesics.  Fatal respiratory depression may also occur with concomitant opioid use.  Patient is aware and acknowledges the risk of the aforementioned.

## 2023-08-04 NOTE — PHYSICAL EXAM
[Walk Is Unsteady] : walk is unsteady [FreeTextEntry2] : ambulates with walker [Slowed] : slowed [Cooperative] : cooperative [Depressed] : depressed [Constricted] : constricted [Soft] : soft [Linear/Goal Directed] : linear/goal directed [None] : none [None Reported] : none reported [Average] : average [WNL] : within normal limits [FreeTextEntry1] : age appearing [place] female, kyphotic, ambulating with walker

## 2023-08-04 NOTE — REASON FOR VISIT
[Patient] : Patient [Family Member] : family member [Formal Caregiver] : formal caregiver [TextBox_17] : Roni Guevara ([facility], [facility]) [FreeTextEntry1] : Follow-up CC: "I'm dealing with a lot of pain"  The patient presented for OPD follow-up in-person, accompanied by her son and caretaker, Roni.

## 2023-08-04 NOTE — DISCUSSION/SUMMARY
[Low acute suicide risk] : Low acute suicide risk [No] : No [Not clinically indicated] : Safety Plan completed/updated (for individuals at risk): Not clinically indicated [FreeTextEntry1] : Patient is low acute risk for suicide due to strong familial supports, no past attempts, and no current suicidal ideation.

## 2023-08-05 DIAGNOSIS — F41.9 ANXIETY DISORDER, UNSPECIFIED: ICD-10-CM

## 2023-08-10 ENCOUNTER — APPOINTMENT (OUTPATIENT)
Dept: PSYCHIATRY | Facility: CLINIC | Age: 53
End: 2023-08-10

## 2023-08-11 ENCOUNTER — OUTPATIENT (OUTPATIENT)
Dept: OUTPATIENT SERVICES | Facility: HOSPITAL | Age: 53
LOS: 1 days | End: 2023-08-11
Payer: MEDICAID

## 2023-08-11 ENCOUNTER — APPOINTMENT (OUTPATIENT)
Dept: PSYCHIATRY | Facility: CLINIC | Age: 53
End: 2023-08-11

## 2023-08-11 DIAGNOSIS — F41.1 GENERALIZED ANXIETY DISORDER: ICD-10-CM

## 2023-08-11 DIAGNOSIS — Z98.890 OTHER SPECIFIED POSTPROCEDURAL STATES: Chronic | ICD-10-CM

## 2023-08-11 PROCEDURE — 90832 PSYTX W PT 30 MINUTES: CPT | Mod: 95

## 2023-08-11 NOTE — PHYSICAL EXAM
[Anxious] : anxious [Cooperative] : cooperative [Clear] : clear [Full] : full [Linear/Goal Directed] : linear/goal directed [Average] : average [Memory] : memory [WNL] : within normal limits

## 2023-08-12 DIAGNOSIS — F41.1 GENERALIZED ANXIETY DISORDER: ICD-10-CM

## 2023-08-15 NOTE — REASON FOR VISIT
[Patient preference] : as per patient preference [Telehealth (audio & video) - Individual/Group] : This visit was provided via telehealth using real-time 2-way audio visual technology. [Medical Office: ([facility])___] : The provider was located at the medical office in [unfilled]. [Verbal consent obtained from patient/other participant(s)] : Verbal consent for telehealth/telephonic services obtained from patient/other participant(s) [Home] : The patient, [unfilled], was located at home, [unfilled], at the time of the visit. [Patient] : Patient [FreeTextEntry4] : 3pm [FreeTextEntry5] : 3:45pm [FreeTextEntry2] : 6/6/23 [FreeTextEntry1] : Anxiety/Depression/ therapy session

## 2023-08-15 NOTE — PLAN
[Cognitive and/or Behavior Therapy] : Cognitive and/or Behavior Therapy  [Supportive Therapy] : Supportive Therapy [Motivational Interviewing] : Motivational Interviewing  [Recommended Frequency of Visits: ____] : Recommended frequency of visits: [unfilled] [FreeTextEntry2] : \par  Goal #1: Manage physical healthcare conditions and cope with related stress \par  Goal #1 Objective #1: Take medications/treatments as prescribed on a daily basis \par  Goal #1 Objective #2: Maintain good overall physical health and healthcare practices such as doing light exercises from chair every morning, and going out using her walker 2-3 times a week   [de-identified] : Patient and the writer met via telehealth. Patient is doing well considering her physical limitations and daily back pain she endorses. Patient states her goal is to get better so that eventually she can go back to work, patient feels she is a burden to her family caring for her. Patients son is her HHA daily which she finds it very useful and states he takes really good care of her. She states that she was finally able to find a physical therapist that takes straight Medicaid and has been approved for 10 sessions and that they are going well. Patient was approved for medical marijuana but has not yet bought any because she has no money, and her son is paying for all the bills. She is waiting to save money and try the gummies to help with pain and anxiety. Supportive therapy provided during session, goals discussed, and patient will continue to be seen bi weekly for therapy. Motivational interviewing used during session, patient will continue to  take medications as prescribed and maintain physical health such as doing light exercise while sitting in a chair, going out for short walks and stretching while using her walker and start journaling her day-to-day feelings and activities and maintain positive self-talk Patient denies S/H/I or self-harming behaviors.  [FreeTextEntry1] : Bi weekly sessions.

## 2023-08-29 ENCOUNTER — OUTPATIENT (OUTPATIENT)
Dept: OUTPATIENT SERVICES | Facility: HOSPITAL | Age: 53
LOS: 1 days | End: 2023-08-29
Payer: MEDICAID

## 2023-08-29 ENCOUNTER — APPOINTMENT (OUTPATIENT)
Dept: PSYCHIATRY | Facility: CLINIC | Age: 53
End: 2023-08-29
Payer: MEDICAID

## 2023-08-29 DIAGNOSIS — F41.9 ANXIETY DISORDER, UNSPECIFIED: ICD-10-CM

## 2023-08-29 DIAGNOSIS — Z98.890 OTHER SPECIFIED POSTPROCEDURAL STATES: Chronic | ICD-10-CM

## 2023-08-29 PROCEDURE — 99213 OFFICE O/P EST LOW 20 MIN: CPT | Mod: 95

## 2023-08-29 PROCEDURE — ZZZZZ: CPT

## 2023-08-30 DIAGNOSIS — F41.9 ANXIETY DISORDER, UNSPECIFIED: ICD-10-CM

## 2023-09-01 ENCOUNTER — OUTPATIENT (OUTPATIENT)
Dept: OUTPATIENT SERVICES | Facility: HOSPITAL | Age: 53
LOS: 1 days | End: 2023-09-01
Payer: MEDICAID

## 2023-09-01 ENCOUNTER — APPOINTMENT (OUTPATIENT)
Dept: PSYCHIATRY | Facility: CLINIC | Age: 53
End: 2023-09-01

## 2023-09-01 DIAGNOSIS — Z98.890 OTHER SPECIFIED POSTPROCEDURAL STATES: Chronic | ICD-10-CM

## 2023-09-01 DIAGNOSIS — F33.1 MAJOR DEPRESSIVE DISORDER, RECURRENT, MODERATE: ICD-10-CM

## 2023-09-01 PROCEDURE — 90832 PSYTX W PT 30 MINUTES: CPT | Mod: 95

## 2023-09-01 NOTE — REASON FOR VISIT
[Patient preference] : as per patient preference [Telehealth (audio & video) - Individual/Group] : This visit was provided via telehealth using real-time 2-way audio visual technology. [Medical Office: ([facility])___] : The provider was located at the medical office in [unfilled]. [Home] : The patient, [unfilled], was located at home, [unfilled], at the time of the visit. [Verbal consent obtained from patient/other participant(s)] : Verbal consent for telehealth/telephonic services obtained from patient/other participant(s) [FreeTextEntry4] : 2pm [FreeTextEntry5] : 2:17pm [FreeTextEntry2] : 6/6/23 [Patient] : Patient [FreeTextEntry1] : Anxiety/Depression/ therapy session

## 2023-09-01 NOTE — PLAN
[FreeTextEntry2] : \par  Goal #1: Manage physical healthcare conditions and cope with related stress \par  Goal #1 Objective #1: Take medications/treatments as prescribed on a daily basis \par  Goal #1 Objective #2: Maintain good overall physical health and healthcare practices such as doing light exercises from chair every morning, and going out using her walker 2-3 times a week   [Cognitive and/or Behavior Therapy] : Cognitive and/or Behavior Therapy  [Motivational Interviewing] : Motivational Interviewing  [Supportive Therapy] : Supportive Therapy [de-identified] : Patient and the writer met via telehealth. Patient is doing well considering the physical pain she lives with and is manageable by pain doctor. Patient was approved for medical marijuana but has not yet bought any because she has no money, and her son is paying for all the bills. She is waiting to save money and try the gummies to help with pain and anxiety. Patient is sad that her son plans on moving to [place] for 4 years for college and he will no longer be her HHA/Caretaker. Next week on Thursday patient is getting an epidural on her neck and patient hopes it will alleviate the pain she has been having on her neck.  Patient will need to get a new HHA but cannot get the house her son has because he lived with her and helped her even after HHA hours were completed. Supportive therapy provided during session, goals discussed, and patient will continue to be seen bi weekly for therapy. Motivational interviewing used during session, patient will continue to take medications as prescribed and maintain physical health such as doing light exercise while sitting in a chair, going out for short walks and stretching while using her walker and start journaling her day-to-day feelings and activities and maintain positive self-talk Patient denies S/H/I or self-harming behaviors.  [Recommended Frequency of Visits: ____] : Recommended frequency of visits: [unfilled] [FreeTextEntry1] : Bi weekly sessions.

## 2023-09-02 DIAGNOSIS — F33.1 MAJOR DEPRESSIVE DISORDER, RECURRENT, MODERATE: ICD-10-CM

## 2023-09-21 ENCOUNTER — OUTPATIENT (OUTPATIENT)
Dept: OUTPATIENT SERVICES | Facility: HOSPITAL | Age: 53
LOS: 1 days | End: 2023-09-21
Payer: MEDICAID

## 2023-09-21 ENCOUNTER — APPOINTMENT (OUTPATIENT)
Dept: INTERNAL MEDICINE | Facility: CLINIC | Age: 53
End: 2023-09-21
Payer: MEDICAID

## 2023-09-21 ENCOUNTER — LABORATORY RESULT (OUTPATIENT)
Age: 53
End: 2023-09-21

## 2023-09-21 VITALS
HEART RATE: 69 BPM | TEMPERATURE: 95.7 F | OXYGEN SATURATION: 99 % | BODY MASS INDEX: 31.02 KG/M2 | HEIGHT: 60 IN | DIASTOLIC BLOOD PRESSURE: 63 MMHG | SYSTOLIC BLOOD PRESSURE: 145 MMHG | WEIGHT: 158 LBS

## 2023-09-21 VITALS — DIASTOLIC BLOOD PRESSURE: 75 MMHG | SYSTOLIC BLOOD PRESSURE: 138 MMHG

## 2023-09-21 DIAGNOSIS — Z98.890 OTHER SPECIFIED POSTPROCEDURAL STATES: Chronic | ICD-10-CM

## 2023-09-21 DIAGNOSIS — M54.6 PAIN IN THORACIC SPINE: ICD-10-CM

## 2023-09-21 DIAGNOSIS — Z00.00 ENCOUNTER FOR GENERAL ADULT MEDICAL EXAMINATION WITHOUT ABNORMAL FINDINGS: ICD-10-CM

## 2023-09-21 PROCEDURE — 99214 OFFICE O/P EST MOD 30 MIN: CPT | Mod: GC

## 2023-09-21 PROCEDURE — 90471 IMMUNIZATION ADMIN: CPT | Mod: 25

## 2023-09-21 PROCEDURE — 81001 URINALYSIS AUTO W/SCOPE: CPT

## 2023-09-21 PROCEDURE — 80053 COMPREHEN METABOLIC PANEL: CPT

## 2023-09-21 PROCEDURE — 83036 HEMOGLOBIN GLYCOSYLATED A1C: CPT

## 2023-09-21 PROCEDURE — 87086 URINE CULTURE/COLONY COUNT: CPT

## 2023-09-21 PROCEDURE — 80061 LIPID PANEL: CPT

## 2023-09-21 PROCEDURE — 99214 OFFICE O/P EST MOD 30 MIN: CPT

## 2023-09-21 PROCEDURE — 85027 COMPLETE CBC AUTOMATED: CPT

## 2023-09-21 PROCEDURE — 90662 IIV NO PRSV INCREASED AG IM: CPT

## 2023-09-21 RX ORDER — OXYCODONE AND ACETAMINOPHEN 5; 325 MG/1; MG/1
5-325 TABLET ORAL TWICE DAILY
Qty: 30 | Refills: 0 | Status: DISCONTINUED | COMMUNITY
Start: 2022-07-14 | End: 2023-09-21

## 2023-09-21 RX ORDER — NITROFURANTOIN (MONOHYDRATE/MACROCRYSTALS) 25; 75 MG/1; MG/1
100 CAPSULE ORAL
Qty: 6 | Refills: 0 | Status: COMPLETED | COMMUNITY
Start: 2023-09-21 | End: 2023-09-24

## 2023-09-21 RX ORDER — PAROXETINE HYDROCHLORIDE 30 MG/1
30 TABLET, FILM COATED ORAL
Qty: 30 | Refills: 2 | Status: COMPLETED | COMMUNITY
Start: 2023-04-26 | End: 2023-09-21

## 2023-09-22 ENCOUNTER — OUTPATIENT (OUTPATIENT)
Dept: OUTPATIENT SERVICES | Facility: HOSPITAL | Age: 53
LOS: 1 days | End: 2023-09-22
Payer: MEDICAID

## 2023-09-22 ENCOUNTER — APPOINTMENT (OUTPATIENT)
Dept: PSYCHIATRY | Facility: CLINIC | Age: 53
End: 2023-09-22

## 2023-09-22 DIAGNOSIS — Z98.890 OTHER SPECIFIED POSTPROCEDURAL STATES: Chronic | ICD-10-CM

## 2023-09-22 DIAGNOSIS — F41.9 ANXIETY DISORDER, UNSPECIFIED: ICD-10-CM

## 2023-09-22 PROCEDURE — 90832 PSYTX W PT 30 MINUTES: CPT | Mod: 95

## 2023-09-23 DIAGNOSIS — F41.9 ANXIETY DISORDER, UNSPECIFIED: ICD-10-CM

## 2023-09-23 DIAGNOSIS — G89.29 OTHER CHRONIC PAIN: ICD-10-CM

## 2023-09-23 DIAGNOSIS — R30.0 DYSURIA: ICD-10-CM

## 2023-09-23 DIAGNOSIS — Z23 ENCOUNTER FOR IMMUNIZATION: ICD-10-CM

## 2023-09-23 DIAGNOSIS — J45.909 UNSPECIFIED ASTHMA, UNCOMPLICATED: ICD-10-CM

## 2023-09-23 DIAGNOSIS — M54.6 PAIN IN THORACIC SPINE: ICD-10-CM

## 2023-09-26 ENCOUNTER — APPOINTMENT (OUTPATIENT)
Dept: NEUROLOGY | Facility: CLINIC | Age: 53
End: 2023-09-26
Payer: MEDICAID

## 2023-09-26 ENCOUNTER — NON-APPOINTMENT (OUTPATIENT)
Age: 53
End: 2023-09-26

## 2023-09-26 ENCOUNTER — OUTPATIENT (OUTPATIENT)
Dept: OUTPATIENT SERVICES | Facility: HOSPITAL | Age: 53
LOS: 1 days | End: 2023-09-26
Payer: MEDICAID

## 2023-09-26 ENCOUNTER — APPOINTMENT (OUTPATIENT)
Dept: PSYCHIATRY | Facility: CLINIC | Age: 53
End: 2023-09-26
Payer: MEDICAID

## 2023-09-26 VITALS — DIASTOLIC BLOOD PRESSURE: 70 MMHG | RESPIRATION RATE: 46 BRPM | SYSTOLIC BLOOD PRESSURE: 129 MMHG

## 2023-09-26 DIAGNOSIS — Z00.00 ENCOUNTER FOR GENERAL ADULT MEDICAL EXAMINATION WITHOUT ABNORMAL FINDINGS: ICD-10-CM

## 2023-09-26 DIAGNOSIS — Z98.890 OTHER SPECIFIED POSTPROCEDURAL STATES: Chronic | ICD-10-CM

## 2023-09-26 DIAGNOSIS — F43.10 POST-TRAUMATIC STRESS DISORDER, UNSPECIFIED: ICD-10-CM

## 2023-09-26 DIAGNOSIS — F41.1 GENERALIZED ANXIETY DISORDER: ICD-10-CM

## 2023-09-26 DIAGNOSIS — F41.9 ANXIETY DISORDER, UNSPECIFIED: ICD-10-CM

## 2023-09-26 DIAGNOSIS — F33.41 MAJOR DEPRESSIVE DISORDER, RECURRENT, IN PARTIAL REMISSION: ICD-10-CM

## 2023-09-26 PROCEDURE — ZZZZZ: CPT

## 2023-09-26 PROCEDURE — 99214 OFFICE O/P EST MOD 30 MIN: CPT

## 2023-09-26 PROCEDURE — 99214 OFFICE O/P EST MOD 30 MIN: CPT | Mod: 25

## 2023-09-27 ENCOUNTER — NON-APPOINTMENT (OUTPATIENT)
Age: 53
End: 2023-09-27

## 2023-09-27 DIAGNOSIS — F41.1 GENERALIZED ANXIETY DISORDER: ICD-10-CM

## 2023-09-27 DIAGNOSIS — F33.41 MAJOR DEPRESSIVE DISORDER, RECURRENT, IN PARTIAL REMISSION: ICD-10-CM

## 2023-09-27 DIAGNOSIS — F43.10 POST-TRAUMATIC STRESS DISORDER, UNSPECIFIED: ICD-10-CM

## 2023-09-28 ENCOUNTER — RESULT REVIEW (OUTPATIENT)
Age: 53
End: 2023-09-28

## 2023-09-28 ENCOUNTER — OUTPATIENT (OUTPATIENT)
Dept: OUTPATIENT SERVICES | Facility: HOSPITAL | Age: 53
LOS: 1 days | End: 2023-09-28
Payer: MEDICAID

## 2023-09-28 DIAGNOSIS — Z98.890 OTHER SPECIFIED POSTPROCEDURAL STATES: Chronic | ICD-10-CM

## 2023-09-28 DIAGNOSIS — Z12.31 ENCOUNTER FOR SCREENING MAMMOGRAM FOR MALIGNANT NEOPLASM OF BREAST: ICD-10-CM

## 2023-09-28 PROCEDURE — 77067 SCR MAMMO BI INCL CAD: CPT

## 2023-09-28 PROCEDURE — 77063 BREAST TOMOSYNTHESIS BI: CPT

## 2023-09-28 PROCEDURE — 77063 BREAST TOMOSYNTHESIS BI: CPT | Mod: 26

## 2023-09-28 PROCEDURE — 77067 SCR MAMMO BI INCL CAD: CPT | Mod: 26

## 2023-09-29 DIAGNOSIS — Z12.31 ENCOUNTER FOR SCREENING MAMMOGRAM FOR MALIGNANT NEOPLASM OF BREAST: ICD-10-CM

## 2023-10-05 ENCOUNTER — NON-APPOINTMENT (OUTPATIENT)
Age: 53
End: 2023-10-05

## 2023-10-05 ENCOUNTER — RESULT REVIEW (OUTPATIENT)
Age: 53
End: 2023-10-05

## 2023-10-05 ENCOUNTER — OUTPATIENT (OUTPATIENT)
Dept: OUTPATIENT SERVICES | Facility: HOSPITAL | Age: 53
LOS: 1 days | End: 2023-10-05
Payer: MEDICAID

## 2023-10-05 DIAGNOSIS — Z98.890 OTHER SPECIFIED POSTPROCEDURAL STATES: Chronic | ICD-10-CM

## 2023-10-05 DIAGNOSIS — R92.8 OTHER ABNORMAL AND INCONCLUSIVE FINDINGS ON DIAGNOSTIC IMAGING OF BREAST: ICD-10-CM

## 2023-10-05 PROCEDURE — 76642 ULTRASOUND BREAST LIMITED: CPT | Mod: RT

## 2023-10-05 PROCEDURE — G0279: CPT | Mod: 26

## 2023-10-05 PROCEDURE — G0279: CPT

## 2023-10-05 PROCEDURE — 76642 ULTRASOUND BREAST LIMITED: CPT | Mod: 26,RT

## 2023-10-05 PROCEDURE — 77065 DX MAMMO INCL CAD UNI: CPT | Mod: RT

## 2023-10-05 PROCEDURE — 77065 DX MAMMO INCL CAD UNI: CPT | Mod: 26,RT

## 2023-10-06 ENCOUNTER — APPOINTMENT (OUTPATIENT)
Dept: PSYCHIATRY | Facility: CLINIC | Age: 53
End: 2023-10-06

## 2023-10-06 ENCOUNTER — OUTPATIENT (OUTPATIENT)
Dept: OUTPATIENT SERVICES | Facility: HOSPITAL | Age: 53
LOS: 1 days | End: 2023-10-06
Payer: MEDICAID

## 2023-10-06 DIAGNOSIS — F41.9 ANXIETY DISORDER, UNSPECIFIED: ICD-10-CM

## 2023-10-06 DIAGNOSIS — Z98.890 OTHER SPECIFIED POSTPROCEDURAL STATES: Chronic | ICD-10-CM

## 2023-10-06 DIAGNOSIS — R92.8 OTHER ABNORMAL AND INCONCLUSIVE FINDINGS ON DIAGNOSTIC IMAGING OF BREAST: ICD-10-CM

## 2023-10-06 PROCEDURE — 90832 PSYTX W PT 30 MINUTES: CPT | Mod: 95

## 2023-10-07 DIAGNOSIS — F41.9 ANXIETY DISORDER, UNSPECIFIED: ICD-10-CM

## 2023-10-11 DIAGNOSIS — M54.9 DORSALGIA, UNSPECIFIED: ICD-10-CM

## 2023-10-11 DIAGNOSIS — G89.29 OTHER CHRONIC PAIN: ICD-10-CM

## 2023-10-16 LAB
ALBUMIN SERPL ELPH-MCNC: 4.5 G/DL
ALP BLD-CCNC: 139 U/L
ALT SERPL-CCNC: 31 U/L
ANION GAP SERPL CALC-SCNC: 14 MMOL/L
APPEARANCE: ABNORMAL
AST SERPL-CCNC: 13 U/L
BACTERIA UR CULT: ABNORMAL
BASOPHILS # BLD AUTO: 0.05 K/UL
BASOPHILS NFR BLD AUTO: 0.7 %
BILIRUB SERPL-MCNC: 0.4 MG/DL
BILIRUBIN URINE: NEGATIVE
BLOOD URINE: NEGATIVE
BUN SERPL-MCNC: 11 MG/DL
CALCIUM SERPL-MCNC: 9.4 MG/DL
CHLORIDE SERPL-SCNC: 104 MMOL/L
CHOLEST SERPL-MCNC: 184 MG/DL
CO2 SERPL-SCNC: 24 MMOL/L
COLOR: NORMAL
CREAT SERPL-MCNC: 0.7 MG/DL
EGFR: 103 ML/MIN/1.73M2
EOSINOPHIL # BLD AUTO: 0.06 K/UL
EOSINOPHIL NFR BLD AUTO: 0.9 %
ESTIMATED AVERAGE GLUCOSE: 131 MG/DL
GLUCOSE QUALITATIVE U: NEGATIVE MG/DL
GLUCOSE SERPL-MCNC: 105 MG/DL
HBA1C MFR BLD HPLC: 6.2 %
HCT VFR BLD CALC: 43.4 %
HDLC SERPL-MCNC: 58 MG/DL
HGB BLD-MCNC: 13.4 G/DL
IMM GRANULOCYTES NFR BLD AUTO: 0.3 %
KETONES URINE: NEGATIVE MG/DL
LDLC SERPL CALC-MCNC: 102 MG/DL
LEUKOCYTE ESTERASE URINE: NEGATIVE
LYMPHOCYTES # BLD AUTO: 2.6 K/UL
LYMPHOCYTES NFR BLD AUTO: 38.7 %
MAN DIFF?: NORMAL
MCHC RBC-ENTMCNC: 29.8 PG
MCHC RBC-ENTMCNC: 30.9 G/DL
MCV RBC AUTO: 96.4 FL
MONOCYTES # BLD AUTO: 0.36 K/UL
MONOCYTES NFR BLD AUTO: 5.4 %
NEUTROPHILS # BLD AUTO: 3.63 K/UL
NEUTROPHILS NFR BLD AUTO: 54 %
NITRITE URINE: NEGATIVE
NONHDLC SERPL-MCNC: 126 MG/DL
PH URINE: 5.5
PLATELET # BLD AUTO: 205 K/UL
POTASSIUM SERPL-SCNC: 4.7 MMOL/L
PROT SERPL-MCNC: 7.3 G/DL
PROTEIN URINE: NORMAL MG/DL
RBC # BLD: 4.5 M/UL
RBC # FLD: 13.3 %
SODIUM SERPL-SCNC: 142 MMOL/L
SPECIFIC GRAVITY URINE: 1.03
TRIGL SERPL-MCNC: 119 MG/DL
UROBILINOGEN URINE: 1 MG/DL
WBC # FLD AUTO: 6.72 K/UL

## 2023-10-17 ENCOUNTER — NON-APPOINTMENT (OUTPATIENT)
Age: 53
End: 2023-10-17

## 2023-10-25 ENCOUNTER — APPOINTMENT (OUTPATIENT)
Dept: NEUROSURGERY | Facility: CLINIC | Age: 53
End: 2023-10-25
Payer: MEDICAID

## 2023-10-25 VITALS — WEIGHT: 158 LBS | HEIGHT: 60 IN | BODY MASS INDEX: 31.02 KG/M2

## 2023-10-25 PROCEDURE — 99213 OFFICE O/P EST LOW 20 MIN: CPT

## 2023-10-27 ENCOUNTER — APPOINTMENT (OUTPATIENT)
Dept: PSYCHIATRY | Facility: CLINIC | Age: 53
End: 2023-10-27

## 2023-10-27 ENCOUNTER — OUTPATIENT (OUTPATIENT)
Dept: OUTPATIENT SERVICES | Facility: HOSPITAL | Age: 53
LOS: 1 days | End: 2023-10-27
Payer: MEDICAID

## 2023-10-27 DIAGNOSIS — Z98.890 OTHER SPECIFIED POSTPROCEDURAL STATES: Chronic | ICD-10-CM

## 2023-10-27 DIAGNOSIS — F41.9 ANXIETY DISORDER, UNSPECIFIED: ICD-10-CM

## 2023-10-27 PROCEDURE — 90832 PSYTX W PT 30 MINUTES: CPT | Mod: 95

## 2023-10-28 DIAGNOSIS — F41.9 ANXIETY DISORDER, UNSPECIFIED: ICD-10-CM

## 2023-10-31 ENCOUNTER — NON-APPOINTMENT (OUTPATIENT)
Age: 53
End: 2023-10-31

## 2023-11-03 ENCOUNTER — NON-APPOINTMENT (OUTPATIENT)
Age: 53
End: 2023-11-03

## 2023-11-13 ENCOUNTER — RESULT REVIEW (OUTPATIENT)
Age: 53
End: 2023-11-13

## 2023-11-13 ENCOUNTER — OUTPATIENT (OUTPATIENT)
Dept: OUTPATIENT SERVICES | Facility: HOSPITAL | Age: 53
LOS: 1 days | End: 2023-11-13
Payer: MEDICAID

## 2023-11-13 DIAGNOSIS — Z98.890 OTHER SPECIFIED POSTPROCEDURAL STATES: Chronic | ICD-10-CM

## 2023-11-13 DIAGNOSIS — G89.29 OTHER CHRONIC PAIN: ICD-10-CM

## 2023-11-13 PROCEDURE — 72141 MRI NECK SPINE W/O DYE: CPT | Mod: 26

## 2023-11-13 PROCEDURE — 72141 MRI NECK SPINE W/O DYE: CPT

## 2023-11-14 DIAGNOSIS — G89.29 OTHER CHRONIC PAIN: ICD-10-CM

## 2023-11-15 ENCOUNTER — APPOINTMENT (OUTPATIENT)
Dept: ORTHOPEDIC SURGERY | Facility: CLINIC | Age: 53
End: 2023-11-15

## 2023-11-15 ENCOUNTER — OUTPATIENT (OUTPATIENT)
Dept: OUTPATIENT SERVICES | Facility: HOSPITAL | Age: 53
LOS: 1 days | End: 2023-11-15
Payer: MEDICAID

## 2023-11-15 DIAGNOSIS — Z98.890 OTHER SPECIFIED POSTPROCEDURAL STATES: Chronic | ICD-10-CM

## 2023-11-15 DIAGNOSIS — Z00.00 ENCOUNTER FOR GENERAL ADULT MEDICAL EXAMINATION WITHOUT ABNORMAL FINDINGS: ICD-10-CM

## 2023-11-15 PROCEDURE — 99203 OFFICE O/P NEW LOW 30 MIN: CPT
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
## 2023-11-16 DIAGNOSIS — M25.519 PAIN IN UNSPECIFIED SHOULDER: ICD-10-CM
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
## 2023-11-17 ENCOUNTER — APPOINTMENT (OUTPATIENT)
Dept: PSYCHIATRY | Facility: CLINIC | Age: 53
End: 2023-11-17

## 2023-11-17 ENCOUNTER — OUTPATIENT (OUTPATIENT)
Dept: OUTPATIENT SERVICES | Facility: HOSPITAL | Age: 53
LOS: 1 days | End: 2023-11-17
Payer: MEDICAID

## 2023-11-17 DIAGNOSIS — Z98.890 OTHER SPECIFIED POSTPROCEDURAL STATES: Chronic | ICD-10-CM

## 2023-11-17 DIAGNOSIS — F41.9 ANXIETY DISORDER, UNSPECIFIED: ICD-10-CM

## 2023-11-17 PROCEDURE — 90832 PSYTX W PT 30 MINUTES: CPT

## 2023-11-18 DIAGNOSIS — F41.9 ANXIETY DISORDER, UNSPECIFIED: ICD-10-CM

## 2023-11-21 ENCOUNTER — OUTPATIENT (OUTPATIENT)
Dept: OUTPATIENT SERVICES | Facility: HOSPITAL | Age: 53
LOS: 1 days | End: 2023-11-21
Payer: MEDICAID

## 2023-11-21 ENCOUNTER — APPOINTMENT (OUTPATIENT)
Dept: PSYCHIATRY | Facility: CLINIC | Age: 53
End: 2023-11-21
Payer: MEDICAID

## 2023-11-21 DIAGNOSIS — F41.9 ANXIETY DISORDER, UNSPECIFIED: ICD-10-CM

## 2023-11-21 DIAGNOSIS — Z98.890 OTHER SPECIFIED POSTPROCEDURAL STATES: Chronic | ICD-10-CM

## 2023-11-21 PROCEDURE — 99213 OFFICE O/P EST LOW 20 MIN: CPT | Mod: 95

## 2023-11-21 PROCEDURE — ZZZZZ: CPT

## 2023-11-22 DIAGNOSIS — F41.9 ANXIETY DISORDER, UNSPECIFIED: ICD-10-CM

## 2023-11-28 ENCOUNTER — APPOINTMENT (OUTPATIENT)
Dept: PSYCHIATRY | Facility: CLINIC | Age: 53
End: 2023-11-28

## 2023-11-28 ENCOUNTER — OUTPATIENT (OUTPATIENT)
Dept: OUTPATIENT SERVICES | Facility: HOSPITAL | Age: 53
LOS: 1 days | End: 2023-11-28
Payer: MEDICAID

## 2023-11-28 DIAGNOSIS — Z98.890 OTHER SPECIFIED POSTPROCEDURAL STATES: Chronic | ICD-10-CM

## 2023-11-28 DIAGNOSIS — F41.9 ANXIETY DISORDER, UNSPECIFIED: ICD-10-CM

## 2023-11-28 PROCEDURE — 90832 PSYTX W PT 30 MINUTES: CPT

## 2023-11-29 DIAGNOSIS — F41.9 ANXIETY DISORDER, UNSPECIFIED: ICD-10-CM

## 2023-12-07 ENCOUNTER — APPOINTMENT (OUTPATIENT)
Dept: NEUROSURGERY | Facility: CLINIC | Age: 53
End: 2023-12-07
Payer: MEDICAID

## 2023-12-07 VITALS — BODY MASS INDEX: 31.22 KG/M2 | HEIGHT: 60 IN | WEIGHT: 159 LBS

## 2023-12-07 DIAGNOSIS — Z96.89 PRESENCE OF OTHER SPECIFIED FUNCTIONAL IMPLANTS: ICD-10-CM

## 2023-12-07 PROCEDURE — 99214 OFFICE O/P EST MOD 30 MIN: CPT

## 2023-12-08 PROBLEM — Z96.89 S/P INSERTION OF SPINAL CORD STIMULATOR: Status: ACTIVE | Noted: 2023-12-08

## 2023-12-08 PROBLEM — Z96.89 SPINAL CORD STIMULATOR STATUS: Status: ACTIVE | Noted: 2023-12-08

## 2023-12-11 ENCOUNTER — OUTPATIENT (OUTPATIENT)
Dept: OUTPATIENT SERVICES | Facility: HOSPITAL | Age: 53
LOS: 1 days | End: 2023-12-11
Payer: MEDICAID

## 2023-12-11 ENCOUNTER — APPOINTMENT (OUTPATIENT)
Dept: PSYCHIATRY | Facility: CLINIC | Age: 53
End: 2023-12-11

## 2023-12-11 DIAGNOSIS — F41.9 ANXIETY DISORDER, UNSPECIFIED: ICD-10-CM

## 2023-12-11 DIAGNOSIS — Z98.890 OTHER SPECIFIED POSTPROCEDURAL STATES: Chronic | ICD-10-CM

## 2023-12-11 PROCEDURE — 90832 PSYTX W PT 30 MINUTES: CPT

## 2023-12-12 DIAGNOSIS — F41.9 ANXIETY DISORDER, UNSPECIFIED: ICD-10-CM

## 2023-12-15 ENCOUNTER — OUTPATIENT (OUTPATIENT)
Dept: OUTPATIENT SERVICES | Facility: HOSPITAL | Age: 53
LOS: 1 days | End: 2023-12-15
Payer: MEDICAID

## 2023-12-15 ENCOUNTER — APPOINTMENT (OUTPATIENT)
Dept: INTERNAL MEDICINE | Facility: CLINIC | Age: 53
End: 2023-12-15
Payer: MEDICAID

## 2023-12-15 ENCOUNTER — NON-APPOINTMENT (OUTPATIENT)
Age: 53
End: 2023-12-15

## 2023-12-15 VITALS
SYSTOLIC BLOOD PRESSURE: 124 MMHG | DIASTOLIC BLOOD PRESSURE: 85 MMHG | WEIGHT: 155 LBS | HEIGHT: 67 IN | OXYGEN SATURATION: 98 % | TEMPERATURE: 98.1 F | HEART RATE: 67 BPM | BODY MASS INDEX: 24.33 KG/M2

## 2023-12-15 DIAGNOSIS — Z00.00 ENCOUNTER FOR GENERAL ADULT MEDICAL EXAMINATION WITHOUT ABNORMAL FINDINGS: ICD-10-CM

## 2023-12-15 DIAGNOSIS — R30.0 DYSURIA: ICD-10-CM

## 2023-12-15 DIAGNOSIS — Z98.890 OTHER SPECIFIED POSTPROCEDURAL STATES: Chronic | ICD-10-CM

## 2023-12-15 DIAGNOSIS — G89.29 OTHER CHRONIC PAIN: ICD-10-CM

## 2023-12-15 DIAGNOSIS — F41.1 GENERALIZED ANXIETY DISORDER: ICD-10-CM

## 2023-12-15 DIAGNOSIS — J45.909 UNSPECIFIED ASTHMA, UNCOMPLICATED: ICD-10-CM

## 2023-12-15 DIAGNOSIS — Z00.00 ENCOUNTER FOR GENERAL ADULT MEDICAL EXAMINATION W/OUT ABNORMAL FINDINGS: ICD-10-CM

## 2023-12-15 PROCEDURE — 99214 OFFICE O/P EST MOD 30 MIN: CPT

## 2023-12-15 RX ORDER — NAPROXEN 500 MG/1
500 TABLET ORAL
Qty: 60 | Refills: 0 | Status: DISCONTINUED | COMMUNITY
Start: 2021-06-09 | End: 2023-12-15

## 2023-12-15 RX ORDER — ACETAMINOPHEN 500 MG/1
500 TABLET ORAL TWICE DAILY
Qty: 40 | Refills: 6 | Status: DISCONTINUED | COMMUNITY
Start: 2020-12-18 | End: 2023-12-15

## 2023-12-15 NOTE — PHYSICAL EXAM
[No Acute Distress] : no acute distress [No Respiratory Distress] : no respiratory distress  [No Edema] : there was no peripheral edema [Soft] : abdomen soft [Non Tender] : non-tender [No Joint Swelling] : no joint swelling

## 2023-12-18 ENCOUNTER — NON-APPOINTMENT (OUTPATIENT)
Age: 53
End: 2023-12-18

## 2023-12-19 ENCOUNTER — APPOINTMENT (OUTPATIENT)
Dept: PSYCHIATRY | Facility: CLINIC | Age: 53
End: 2023-12-19
Payer: MEDICAID

## 2023-12-19 ENCOUNTER — OUTPATIENT (OUTPATIENT)
Dept: OUTPATIENT SERVICES | Facility: HOSPITAL | Age: 53
LOS: 1 days | End: 2023-12-19
Payer: MEDICAID

## 2023-12-19 DIAGNOSIS — Z98.890 OTHER SPECIFIED POSTPROCEDURAL STATES: Chronic | ICD-10-CM

## 2023-12-19 DIAGNOSIS — F41.9 ANXIETY DISORDER, UNSPECIFIED: ICD-10-CM

## 2023-12-19 PROCEDURE — ZZZZZ: CPT

## 2023-12-19 PROCEDURE — 99213 OFFICE O/P EST LOW 20 MIN: CPT | Mod: 95

## 2023-12-19 NOTE — REASON FOR VISIT
[Patient preference] : as per patient preference [Continuing, patient seen in-person within last 12 months] : Telehealth services are continuing as patient has been seen in-person within last 12 months. [Telehealth (audio & video) - Individual/Group] : This visit was provided via telehealth using real-time 2-way audio visual technology. [Medical Office: ([facility])___] : The provider was located at the medical office in [unfilled]. [Home] : The patient, [unfilled], was located at home, [unfilled], at the time of the visit. [Verbal consent obtained from patient/other participant(s)] : Verbal consent for telehealth/telephonic services obtained from patient/other participant(s) [FreeTextEntry4] : 4186 [facility] : 5036 [FreeTextEntry2] : 8/4/23 [Patient] : Patient [Family Member] : family member [Formal Caregiver] : formal caregiver [TextBox_17] : Roni Guevara ([facility], [facility]) [FreeTextEntry1] : Follow-up CC: "I'm okay"  The patient presented for OPD follow-up via telehealth.

## 2023-12-19 NOTE — PLAN
[No] : No [Medication education provided] : Medication education provided. [Rationale for medication choices, possible risks/precautions, benefits, alternative treatment choices, and consequences of non-treatment discussed] : Rationale for medication choices, possible risks/precautions, benefits, alternative treatment choices, and consequences of non-treatment discussed with patient/family/caregiver  [FreeTextEntry5] : RTC 1 Month Continue regular therapy with Ardita Continue f/u with pain management  -c/w Paroxetine 30 mg daily -c/w Gabapentin 800 mg TID -c/w Mirtazapine 7.5 mg QHS  Medication side effect profile was reviewed and discussed.  Patient was informed of the increased risk of dizziness and falls when combining Gabapentin with opioid analgesic medications.  She was also advised that Gabapentin can cause dose-dependent CNS depression and/or drowsiness when combined with opioid analgesics.  Fatal respiratory depression may also occur with concomitant opioid use.  Patient is aware and acknowledges the risk of the aforementioned.

## 2023-12-19 NOTE — PHYSICAL EXAM
[FreeTextEntry2] : unable to assess [Cooperative] : cooperative [Euthymic] : euthymic [Full] : full [Clear] : clear [Linear/Goal Directed] : linear/goal directed [None] : none [None Reported] : none reported [Average] : average [WNL] : within normal limits
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
## 2023-12-19 NOTE — HISTORY OF PRESENT ILLNESS
[FreeTextEntry1] : Marta is a 53-year-old [place] female,  with 5 children, unemployed, living with 2 of her adult children in [place], [place].  Marta has PPHx of MDD, KODY and PTSD and is currently on Paroxetine 30 mg daily, Gabapentin 800 mg TID, and Mirtazapine 7.5 mg QHS.  She also has PMHx of chronic back, neck, and shoulder pain (s/p surgery x3, managed by a specialist), as well as asthma.  Her son, Roni, functions as a HHA through [facility] and provides her assistance with all ADLs/IADLs.  Marta presents today for follow-up appointment.    Today, Kirsty presents pleasant and smiling.  She informs that things have been going well with continued compliance to her medication regimen and denies the presence of depressive or anxious symptoms.  She informs that pain management has recommended edible THC as an adjunct to her analgesic regimen; however, due to low funds she cannot afford this out-of-pocket expense, and thus, still struggles with chronic pain.  Marta was counseled on the potential of exacerbation of anxiety symptoms with use of THC and she demonstrates understanding.  Despite her ongoing pain, she rates her baseline mood 7/10 in nature and reports good sleep with use of Remeron.  She is future-oriented and looks forward to spending [place] in [place], [place] with family.  She denies the presence of SI with intent or plan. No overt manic or psychotic symptomatology present.  No medication adverse effects to psychiatric medications were reported.

## 2023-12-20 ENCOUNTER — NON-APPOINTMENT (OUTPATIENT)
Age: 53
End: 2023-12-20

## 2023-12-20 DIAGNOSIS — F41.9 ANXIETY DISORDER, UNSPECIFIED: ICD-10-CM

## 2023-12-21 PROBLEM — G89.29 CHRONIC PAIN: Status: ACTIVE | Noted: 2021-03-29

## 2023-12-21 NOTE — ASSESSMENT
[FreeTextEntry1] :  #Chronic back pain #Spinal surgeries with spinal stimulator - Failed multiple attempts to resolve pain, now with chronic Sx - Following with neurosurgery - Following with ortho for right shoulder pain (they ordered an MRI) - Status post multiple steroid injections - C/w prescribed pain meds  #Dysuria- persistent - Empirically treat w/macrobid 100 twice per day for 3 day course in prior visit - Urien Cx showing strep B in urine (but was taken before getting the Abx as per patient) - Seems now more like itching and pain rather than lashanda dysuria Plan - Diflucan 150mg once and antifungal cream trial - If symptoms persistent, could be vaginal dryness due to menopause  #Generalize Anxiety #Insomnia - Following with psych - c/w meds  #Epigastric pain - c/w ppi  #Pre DM #HLD - last a1c 6.2 - Lipid profile with  - Diet and lifestyle modifications  #Asthma - Continue Albuterol and fluticasone and montelukast - Keep off smoking  #HCM: - Colonoscopy referral - Mammogram oct 2023, next in oct 2024 - Pap done in 2021 - RTC in 3 months

## 2023-12-21 NOTE — HISTORY OF PRESENT ILLNESS
[FreeTextEntry1] : Follow-up [de-identified] : Case of 53-year-old female patient with pre-DM, GERD, asthma, and History of fall in 2010 s/p spinal surgeries and lumbar fusion in 2018 and sp spinal stimulator presenting for follow up. No major complaints. On ROS, she mentions dysuria and vaginal itchiness.

## 2023-12-26 ENCOUNTER — EMERGENCY (EMERGENCY)
Facility: HOSPITAL | Age: 53
LOS: 0 days | Discharge: HOME | End: 2023-12-26
Attending: STUDENT IN AN ORGANIZED HEALTH CARE EDUCATION/TRAINING PROGRAM
Payer: COMMERCIAL

## 2023-12-26 VITALS
WEIGHT: 154.1 LBS | OXYGEN SATURATION: 99 % | DIASTOLIC BLOOD PRESSURE: 78 MMHG | HEART RATE: 75 BPM | TEMPERATURE: 98 F | SYSTOLIC BLOOD PRESSURE: 131 MMHG | RESPIRATION RATE: 18 BRPM | HEIGHT: 60 IN

## 2023-12-26 DIAGNOSIS — J02.9 ACUTE PHARYNGITIS, UNSPECIFIED: ICD-10-CM

## 2023-12-26 DIAGNOSIS — Z98.890 OTHER SPECIFIED POSTPROCEDURAL STATES: Chronic | ICD-10-CM

## 2023-12-26 DIAGNOSIS — Z91.011 ALLERGY TO MILK PRODUCTS: ICD-10-CM

## 2023-12-26 DIAGNOSIS — R05.8 OTHER SPECIFIED COUGH: ICD-10-CM

## 2023-12-26 DIAGNOSIS — J45.909 UNSPECIFIED ASTHMA, UNCOMPLICATED: ICD-10-CM

## 2023-12-26 DIAGNOSIS — Z91.018 ALLERGY TO OTHER FOODS: ICD-10-CM

## 2023-12-26 DIAGNOSIS — Z91.013 ALLERGY TO SEAFOOD: ICD-10-CM

## 2023-12-26 DIAGNOSIS — M54.9 DORSALGIA, UNSPECIFIED: ICD-10-CM

## 2023-12-26 DIAGNOSIS — G89.29 OTHER CHRONIC PAIN: ICD-10-CM

## 2023-12-26 PROCEDURE — 99282 EMERGENCY DEPT VISIT SF MDM: CPT

## 2023-12-26 PROCEDURE — 99283 EMERGENCY DEPT VISIT LOW MDM: CPT

## 2023-12-26 NOTE — ED PROVIDER NOTE - PROVIDER TOKENS
FREE:[LAST:[follow up with your primary doctor],PHONE:[(   )    -],FAX:[(   )    -],FOLLOWUP:[1-3 Days]]

## 2023-12-26 NOTE — ED PROVIDER NOTE - PHYSICAL EXAMINATION
CONST: Well appearing in NAD  ENT: No nasal discharge. TM's clear B/L without drainage. Oropharynx normal appearing, no erythema or exudates. Uvula midline.  CARD: Normal S1 S2; Normal rate and rhythm  RESP: Equal BS B/L, No wheezes, rhonchi or rales. No distress  MS: Normal ROM in all extremities. No midline spinal tenderness.  SKIN: Warm, dry, no acute rashes. Good turgor  NEURO: A&Ox3, No focal deficits. Strength 5/5 with no sensory deficits. Steady gait

## 2023-12-26 NOTE — ED PROVIDER NOTE - PATIENT PORTAL LINK FT
You can access the FollowMyHealth Patient Portal offered by [facility] by registering at the following website: [email or web address]. By joining [employer]’s FollowMyHealth portal, you will also be able to view your health information using other applications (apps) compatible with our system. You can access the FollowMyHealth Patient Portal offered by [facility] by registering at the following website: [email or web address]. By joining [employer]’s FollowMyHealth portal, you will also be able to view your health information using other applications (apps) compatible with our system.

## 2023-12-26 NOTE — ED PROVIDER NOTE - CLINICAL SUMMARY MEDICAL DECISION MAKING FREE TEXT BOX
52yo female PMH asthma, chronic back pain, seasonal allergies complaining of dry cough and sore throat x 2-3 days. Denies chest pain, shortness of breath, fever, NVD, abdominal pain. Well appearing on exam. Viral swab pending. Child with similar symptoms also here for evaluation. Given return precautions and follow up outpatient. Patient comofrtable with plan. 54yo female PMH asthma, chronic back pain, seasonal allergies complaining of dry cough and sore throat x 2-3 days. Denies chest pain, shortness of breath, fever, NVD, abdominal pain. Well appearing on exam. Viral swab pending. Child with similar symptoms also here for evaluation. Given return precautions and follow up outpatient. Patient comofrtable with plan.

## 2023-12-26 NOTE — ED PROVIDER NOTE - NSFOLLOWUPINSTRUCTIONS_ED_ALL_ED_FT
Cough    Coughing is a reflex that clears your throat and your airways. Coughing helps to heal and protect your lungs. It is normal to cough occasionally, but a cough that happens with other symptoms or lasts a long time may be a sign of a condition that needs treatment. Coughing may be caused by infections, asthma or COPD, smoking, postnasal drip, gastroesophageal reflux, as well as other medical conditions. Take medicines only as instructed by your health care provider. Avoid environments or triggers that causes you to cough at work or at home.    SEEK IMMEDIATE MEDICAL CARE IF YOU HAVE ANY OF THE FOLLOWING SYMPTOMS: coughing up blood, shortness of breath, rapid heart rate, chest pain, unexplained weight loss or night sweats.    Sore Throat  A sore throat is pain, burning, irritation, or scratchiness in the throat. When you have a sore throat, you may feel pain or tenderness in your throat when you swallow or talk.    Many things can cause a sore throat, including:  An infection.  Seasonal allergies.  Dryness in the air.  Irritants, such as smoke or pollution.  Radiation treatment for cancer.  Gastroesophageal reflux disease (GERD).  A tumor.  A sore throat is often the first sign of another sickness. It may happen with other symptoms, such as coughing, sneezing, fever, and swollen neck glands. Most sore throats go away without medical treatment.    Follow these instructions at home:  Juice, water, and tea.   A do not smoke cigarettes sign.   Medicines    Take over-the-counter and prescription medicines only as told by your health care provider.  Children often get sore throats. Do not give your child aspirin because of the association with Reye's syndrome.  Use throat sprays to soothe your throat as told by your health care provider.  Managing pain    To help with pain, try:  Sipping warm liquids, such as broth, herbal tea, or warm water.  Eating or drinking cold or frozen liquids, such as frozen ice pops.  Gargling with a mixture of salt and water 3–4 times a day or as needed. To make salt water, completely dissolve ½–1 tsp (3–6 g) of salt in 1 cup (237 mL) of warm water.  Sucking on hard candy or throat lozenges.  Putting a cool-mist humidifier in your bedroom at night to moisten the air.  Sitting in the bathroom with the door closed for 5–10 minutes while you run hot water in the shower.  General instructions    Do not use any products that contain nicotine or tobacco. These products include cigarettes, chewing tobacco, and vaping devices, such as e-cigarettes. If you need help quitting, ask your health care provider.  Rest as needed.  Drink enough fluid to keep your urine pale yellow.  Wash your hands often with soap and water for at least 20 seconds. If soap and water are not available, use hand .  Contact a health care provider if:  You have a fever for more than 2–3 days.  You have symptoms that last for more than 2–3 days.  Your throat does not get better within 7 days.  You have a fever and your symptoms suddenly get worse.  Get help right away if:  You have difficulty breathing.  You cannot swallow fluids, soft foods, or your saliva.  You have increased swelling in your throat or neck.  You have persistent nausea and vomiting.  These symptoms may represent a serious problem that is an emergency. Do not wait to see if the symptoms will go away. Get medical help right away. Call your local emergency services (911 in the [place] Do not drive yourself to the hospital.    Summary  A sore throat is pain, burning, irritation, or scratchiness in the throat. Many things can cause a sore throat.  Take over-the-counter medicines only as told by your health care provider.  Rest as needed.  Drink enough fluid to keep your urine pale yellow.  Contact a health care provider if your throat does not get better within 7 days.  This information is not intended to replace advice given to you by your health care provider. Make sure you discuss any questions you have with your health care provider.

## 2023-12-26 NOTE — ED PROVIDER NOTE - NS ED ATTENDING STATEMENT MOD
This was a shared visit with the HANK. I reviewed and verified the documentation and independently performed the documented:
[id number] Comprehensive

## 2023-12-26 NOTE — ED PROVIDER NOTE - OBJECTIVE STATEMENT
patient is a 54yo female PMH asthma, chronic back pain, seasonal allergies complaining of dry cough and sore throat x 3 days. also noted that today her right ear was bothering her. has not had to use asthma inhaler more frequently. denies chest pain, shortness of breath, fever, NVD, abdominal pain.

## 2023-12-26 NOTE — ED PROVIDER NOTE - CARE PROVIDER_API CALL
6ml puss drained via ultrasound guided need aspiration  Bandage to site  Well tolerated  Labs sent  VSS  Report given  follow up with your primary doctor,   Phone: (   )    -  Fax: (   )    -  Follow Up Time: 1-3 Days

## 2024-01-03 NOTE — ASU PREOP CHECKLIST - SKIN PREP
Spoke with pt, informed per Dr. Watson - follow up with Podiatrist- Dr. Bennett regarding foot pain, pt states she has appt next week to see Dr. Bennett, pt verbalized understanding.   n/a

## 2024-01-05 ENCOUNTER — APPOINTMENT (OUTPATIENT)
Dept: PSYCHIATRY | Facility: CLINIC | Age: 54
End: 2024-01-05

## 2024-01-05 ENCOUNTER — OUTPATIENT (OUTPATIENT)
Dept: OUTPATIENT SERVICES | Facility: HOSPITAL | Age: 54
LOS: 1 days | End: 2024-01-05
Payer: MEDICAID

## 2024-01-05 DIAGNOSIS — Z98.890 OTHER SPECIFIED POSTPROCEDURAL STATES: Chronic | ICD-10-CM

## 2024-01-05 DIAGNOSIS — F41.9 ANXIETY DISORDER, UNSPECIFIED: ICD-10-CM

## 2024-01-05 PROCEDURE — 90832 PSYTX W PT 30 MINUTES: CPT

## 2024-01-05 NOTE — REASON FOR VISIT
[Patient preference] : as per patient preference [Telehealth (audio & video) - Individual/Group] : This visit was provided via telehealth using real-time 2-way audio visual technology. [Medical Office: ([facility])___] : The provider was located at the medical office in [unfilled]. [Home] : The patient, [unfilled], was located at home, [unfilled], at the time of the visit. [Verbal consent obtained from patient/other participant(s)] : Verbal consent for telehealth/telephonic services obtained from patient/other participant(s) [FreeTextEntry4] : 3pm [FreeTextEntry5] : 3:20pm [FreeTextEntry2] : 6/6/23 [Patient] : Patient [FreeTextEntry1] : Anxiety/Depression/ therapy session

## 2024-01-05 NOTE — PLAN
[FreeTextEntry2] : \par  Goal #1: Manage physical healthcare conditions and cope with related stress \par  Goal #1 Objective #1: Take medications/treatments as prescribed on a daily basis \par  Goal #1 Objective #2: Maintain good overall physical health and healthcare practices such as doing light exercises from chair every morning, and going out using her walker 2-3 times a week   [Cognitive and/or Behavior Therapy] : Cognitive and/or Behavior Therapy  [Motivational Interviewing] : Motivational Interviewing  [Supportive Therapy] : Supportive Therapy [de-identified] : Patient and the writer met via telehealth. Patient is doing well, she spent new years at her sons house and reports to have had a great time. She has a lot of doctor appointments for multiple health complications. Patient has an upcoming SSID interview and is making her anxious because she was declined 4 times prior over the last 10 years. Writer and patient practiced some deep breathing exercises and did them together during the session. Patient has constant back problems, neck and shoulder and feet, patient takes medications to manage it but states even with meds pain is extreme. Patient follows up with her neurosurgeon regularly as well as her PMD and pain management doctor. Patient has an upcoming SSID interview and is making her anxious because she was declined 4 times prior over the last 10 years. Writer and patient practiced some deep breathing exercises and did them together during the session.  Supportive therapy provided during session, goals discussed, and patient will continue to be seen bi weekly for therapy. Motivational interviewing used during session, patient will continue to take medications as prescribed and maintain physical health such as doing light exercise while sitting in a chair, going out for short walks and stretching while using her walker and start journaling her day-to-day feelings and activities and maintain positive self-talk Patient denies S/H/I or self-harming behaviors.  [Recommended Frequency of Visits: ____] : Recommended frequency of visits: [unfilled] [FreeTextEntry1] : 2-3 week sessions/.

## 2024-01-06 DIAGNOSIS — F41.9 ANXIETY DISORDER, UNSPECIFIED: ICD-10-CM

## 2024-01-08 NOTE — HISTORY OF PRESENT ILLNESS
[FreeTextEntry1] : Ms. RIOS is a pleasant 53-year-old female presenting to the neurosurgery office today alongside her son for a follow-up for her [employer] SCS, placed percutaneously on 2/11/2021.   As a review, patient has a history of C3-4, C4-5 ACDF and a L5-S1 fusion by Dr. Strange. She presents today stating that she has been in pain for the last couple of days specifically to her bilateral lower extremities extending to her feet. Endorses that her pain level is 10/10. She was overall doing well and then gradually the pain to the lower back returned and is now accompanied with radicular features to the lower extremities, bilaterally.   Patient was further reprogrammed today under supervision and no concerns were identified afterward. The coverage, as per the [employer] representative, includes the lower extremities but was unable to cover her feet as well. Once the device programming was geared towards her feet, the pain returned to her back to a level that she could not tolerate.   Ms. RIOS will return to the office on an as needed basis going forward, she is aware to contact us barring any issues or her [employer] rep for any reprogramming needs.

## 2024-01-08 NOTE — PLAN
[TextEntry] : Plan: Patient will return to the office on an as-needed basis going forward understanding that she may contact us with any concerns and her [employer] SCS representative for any reprogramming needs  No concerns were identified during the visit and we thank her for allowing us to be a part of her care team once again   I personally reviewed with the NP, this patient's history and physical exam findings, as documented above. I have discussed the relevant areas of concern, having direct implications to the presenting problems and illnesses, and I have personally examined all pertinent and positive and negative findings, which impact their neurosurgical treatment.

## 2024-01-16 ENCOUNTER — APPOINTMENT (OUTPATIENT)
Dept: PSYCHIATRY | Facility: CLINIC | Age: 54
End: 2024-01-16
Payer: MEDICAID

## 2024-01-16 ENCOUNTER — OUTPATIENT (OUTPATIENT)
Dept: OUTPATIENT SERVICES | Facility: HOSPITAL | Age: 54
LOS: 1 days | End: 2024-01-16
Payer: MEDICAID

## 2024-01-16 DIAGNOSIS — Z98.890 OTHER SPECIFIED POSTPROCEDURAL STATES: Chronic | ICD-10-CM

## 2024-01-16 DIAGNOSIS — F41.9 ANXIETY DISORDER, UNSPECIFIED: ICD-10-CM

## 2024-01-16 DIAGNOSIS — F41.1 GENERALIZED ANXIETY DISORDER: ICD-10-CM

## 2024-01-16 DIAGNOSIS — F33.42 MAJOR DEPRESSIVE DISORDER, RECURRENT, IN FULL REMISSION: ICD-10-CM

## 2024-01-16 PROCEDURE — ZZZZZ: CPT

## 2024-01-16 PROCEDURE — 99213 OFFICE O/P EST LOW 20 MIN: CPT | Mod: 95

## 2024-01-16 RX ORDER — MIRTAZAPINE 7.5 MG/1
7.5 TABLET, FILM COATED ORAL
Qty: 30 | Refills: 2 | Status: DISCONTINUED | COMMUNITY
Start: 2023-04-26 | End: 2024-01-16

## 2024-01-16 NOTE — HISTORY OF PRESENT ILLNESS
[FreeTextEntry1] : Marta is a 54-year-old [place] female,  with 5 children, unemployed, living with 2 of her adult children in [place], [place].  Marta has PPHx of MDD, KODY and PTSD and is currently on Paroxetine 30 mg daily, Gabapentin 800 mg TID, and Quetiapine 25 mg QHS.  She also has PMHx of chronic back, neck, and shoulder pain (s/p surgery x3, managed by a specialist), as well as asthma.  Her son, Roni, functions as a HHA through [facility] and provides her assistance with all ADLs/IADLs.  Marta presents today for follow-up appointment.    Today, Kirsty presents pleasant, but appears tired and fatigued.  She informs that she remains compliant to her medication regimen and denies the presence of depressive or anxious symptoms; however, complains of difficulties with sleep and informs that Remeron is no longer efficacious as she continues to struggle with chronic pain.  She informs that she used to be on a medication that helped with sleep and asks if she can try another trial of it.  After chart review, it appears as if Seroquel 25 mg is the medication she is referring to.  No medication adverse effects to psychiatric medications were reported.  She was counseled on the metabolic AEs of Seroquel.  She denies the presence of SI with intent or plan.

## 2024-01-16 NOTE — PLAN
[No] : No [Medication education provided] : Medication education provided. [Rationale for medication choices, possible risks/precautions, benefits, alternative treatment choices, and consequences of non-treatment discussed] : Rationale for medication choices, possible risks/precautions, benefits, alternative treatment choices, and consequences of non-treatment discussed with patient/family/caregiver  [FreeTextEntry5] : RTC 1 Month Continue regular therapy with Ardita Continue f/u with pain management  -c/w Paroxetine 30 mg daily -c/w Gabapentin 800 mg TID -d/c Mirtazapine 7.5 mg QHS -initiate Quetiapine 25 mg QHS  Medication side effect profile was reviewed and discussed.  Patient was informed of the increased risk of dizziness and falls when combining Gabapentin with opioid analgesic medications.  She was also advised that Gabapentin can cause dose-dependent CNS depression and/or drowsiness when combined with opioid analgesics.  Fatal respiratory depression may also occur with concomitant opioid use.  Patient is aware and acknowledges the risk of the aforementioned.

## 2024-01-16 NOTE — REASON FOR VISIT
[Patient preference] : as per patient preference [Continuing, patient seen in-person within last 12 months] : Telehealth services are continuing as patient has been seen in-person within last 12 months. [Telehealth (audio & video) - Individual/Group] : This visit was provided via telehealth using real-time 2-way audio visual technology. [Medical Office: ([facility])___] : The provider was located at the medical office in [unfilled]. [Home] : The patient, [unfilled], was located at home, [unfilled], at the time of the visit. [Verbal consent obtained from patient/other participant(s)] : Verbal consent for telehealth/telephonic services obtained from patient/other participant(s) [FreeTextEntry4] : 1142 [FreeTextEntry5] : 4628 [FreeTextEntry2] : 8/4/23 [Patient] : Patient [Family Member] : family member [Formal Caregiver] : formal caregiver [TextBox_17] : Roni Guevara ([facility]) [FreeTextEntry1] : Follow-up CC: "I've been having trouble with sleep"  The patient presented for OPD follow-up via telehealth.

## 2024-01-17 DIAGNOSIS — F41.1 GENERALIZED ANXIETY DISORDER: ICD-10-CM

## 2024-01-17 DIAGNOSIS — F33.42 MAJOR DEPRESSIVE DISORDER, RECURRENT, IN FULL REMISSION: ICD-10-CM

## 2024-01-18 ENCOUNTER — OUTPATIENT (OUTPATIENT)
Dept: OUTPATIENT SERVICES | Facility: HOSPITAL | Age: 54
LOS: 1 days | End: 2024-01-18
Payer: MEDICAID

## 2024-01-18 ENCOUNTER — APPOINTMENT (OUTPATIENT)
Dept: PSYCHIATRY | Facility: CLINIC | Age: 54
End: 2024-01-18

## 2024-01-18 DIAGNOSIS — F41.9 ANXIETY DISORDER, UNSPECIFIED: ICD-10-CM

## 2024-01-18 DIAGNOSIS — Z98.890 OTHER SPECIFIED POSTPROCEDURAL STATES: Chronic | ICD-10-CM

## 2024-01-18 PROCEDURE — 90832 PSYTX W PT 30 MINUTES: CPT

## 2024-01-18 NOTE — PLAN
[FreeTextEntry2] : \par  Goal #1: Manage physical healthcare conditions and cope with related stress \par  Goal #1 Objective #1: Take medications/treatments as prescribed on a daily basis \par  Goal #1 Objective #2: Maintain good overall physical health and healthcare practices such as doing light exercises from chair every morning, and going out using her walker 2-3 times a week   [Cognitive and/or Behavior Therapy] : Cognitive and/or Behavior Therapy  [Motivational Interviewing] : Motivational Interviewing  [Supportive Therapy] : Supportive Therapy [de-identified] : Patient and the writer met via telehealth. Patient has constant back problems, neck and shoulder and feet, patient takes medications to manage it but states even with meds pain is extreme. Patient follows up with her neurosurgeon regularly as well as her PMD and pain management doctor. Patient had the SSID interview, thinks it went well and has to wait for a letter to be mailed to her to find out if she got approved. Patient states that her family is doing well, her kids and grandkids are well and there isn't a lot going on. She continues to be depressed due to her severe physical pain but remains positive and tries to rest as much as she can. Writer and patient practiced some deep breathing exercises and did them together during the session.  Supportive therapy provided during session, goals discussed, and patient will continue to be seen bi weekly for therapy. Motivational interviewing used during session, patient will continue to take medications as prescribed and maintain physical health such as doing light exercise while sitting in a chair, going out for short walks and stretching while using her walker and start journaling her day-to-day feelings and activities and maintain positive self-talk Patient denies S/H/I or self-harming behaviors.  [Recommended Frequency of Visits: ____] : Recommended frequency of visits: [unfilled] [FreeTextEntry1] : 2-3 week sessions/.

## 2024-01-18 NOTE — REASON FOR VISIT
[Patient preference] : as per patient preference [Telehealth (audio & video) - Individual/Group] : This visit was provided via telehealth using real-time 2-way audio visual technology. [Medical Office: ([facility])___] : The provider was located at the medical office in [unfilled]. [Home] : The patient, [unfilled], was located at home, [unfilled], at the time of the visit. [Verbal consent obtained from patient/other participant(s)] : Verbal consent for telehealth/telephonic services obtained from patient/other participant(s) [FreeTextEntry4] : 12pm [FreeTextEntry5] : 12:20pm [FreeTextEntry2] : 6/6/23 [Patient] : Patient [FreeTextEntry1] : Anxiety/Depression/ therapy session

## 2024-01-19 DIAGNOSIS — F41.9 ANXIETY DISORDER, UNSPECIFIED: ICD-10-CM

## 2024-01-30 ENCOUNTER — OUTPATIENT (OUTPATIENT)
Dept: OUTPATIENT SERVICES | Facility: HOSPITAL | Age: 54
LOS: 1 days | End: 2024-01-30
Payer: MEDICAID

## 2024-01-30 ENCOUNTER — APPOINTMENT (OUTPATIENT)
Dept: NEUROLOGY | Facility: CLINIC | Age: 54
End: 2024-01-30
Payer: MEDICAID

## 2024-01-30 VITALS
WEIGHT: 155 LBS | SYSTOLIC BLOOD PRESSURE: 129 MMHG | HEIGHT: 67 IN | DIASTOLIC BLOOD PRESSURE: 78 MMHG | BODY MASS INDEX: 24.33 KG/M2 | HEART RATE: 74 BPM | OXYGEN SATURATION: 100 % | TEMPERATURE: 98 F | RESPIRATION RATE: 20 BRPM

## 2024-01-30 DIAGNOSIS — R41.3 OTHER AMNESIA: ICD-10-CM

## 2024-01-30 DIAGNOSIS — Z98.890 OTHER SPECIFIED POSTPROCEDURAL STATES: Chronic | ICD-10-CM

## 2024-01-30 DIAGNOSIS — Z00.00 ENCOUNTER FOR GENERAL ADULT MEDICAL EXAMINATION WITHOUT ABNORMAL FINDINGS: ICD-10-CM

## 2024-01-30 PROCEDURE — 99214 OFFICE O/P EST MOD 30 MIN: CPT

## 2024-01-30 NOTE — HISTORY OF PRESENT ILLNESS
[FreeTextEntry1] : Marta is a 54-year-old with PPHx of MDD, KODY, PTSD, chronic back, neck, and shoulder pain (s/p surgery x3, managed by a specialist), s/p SCS with some improvement in her leg and LBP but still c/w some pain at neck area. Last time she was referred to MR C-spine which generally stable from prior images. She is here after MRI C-spine. She is f/u w Pain management and Nsx, psychiatry and overall, she is still in very severe pain.  Denies overt weakness, saddle anesthesia, any urinary and bowel incontinence. She is in many medications including Benadryl, Paroxetine, Tizanidine, Percocet, Gabapentin, Baclofen and still feels her memory is affected.

## 2024-01-30 NOTE — PHYSICAL EXAM
[FreeTextEntry1] : Mental Status: alert  Orientation: oriented to person, oriented to place and oriented to time but not date and year. Does not recall current President or last holiday   Language: no difficulty naming common objects, fluency intact, comprehension intact and reading intact.  Cranial Nerves: visual acuity intact bilaterally, visual fields full to confrontation, extraocular motion intact, facial sensation intact symmetrically, face symmetrical, hearing was intact bilaterally, head turning and shoulder shrug symmetric and there was no tongue deviation with protrusion.  Motor: muscle tone was normal in all four extremities, strength 2/5 in right extremity and 3/5 bilateral lower extremities though limited by pain  Sensory exam: light touch was intact but patient reported mildly dec sensation to the right side   Coordination:. Finger to nose intact. No tremor present.  Deep tendon reflexes:  Biceps right 2+. Biceps left 2+.  Triceps right 2+. Triceps left 2+.  Brachioradialis right 2+. Brachioradialis left 2+.  Patella right 2+. Patella left 2+.

## 2024-01-30 NOTE — ASSESSMENT
[FreeTextEntry1] : Marta is a 54-year-old with PMH of MDD, KODY, PTSD, chronic back, neck, and shoulder pain (s/p surgery x3, managed by a pain and neurosurgery specialist) is here for f/u after C-spine MRI which is stable from previous images. She should c/w f/u w pain, neurosurgery, psychiatry and therapy.  Memory issues most likely 2/2 polypharmacy that affect memory and cognition.   Recs:  - c/w pain management, neurosurgery, PT/OT, [id number]/therapy tx.  - gradual decreasing pain medications once pain syndrome is better controlled.  - f/u PRN

## 2024-02-01 DIAGNOSIS — M54.9 DORSALGIA, UNSPECIFIED: ICD-10-CM

## 2024-02-01 DIAGNOSIS — R41.3 OTHER AMNESIA: ICD-10-CM

## 2024-02-08 ENCOUNTER — OUTPATIENT (OUTPATIENT)
Dept: OUTPATIENT SERVICES | Facility: HOSPITAL | Age: 54
LOS: 1 days | End: 2024-02-08
Payer: MEDICAID

## 2024-02-08 ENCOUNTER — APPOINTMENT (OUTPATIENT)
Dept: PSYCHIATRY | Facility: CLINIC | Age: 54
End: 2024-02-08

## 2024-02-08 DIAGNOSIS — F41.9 ANXIETY DISORDER, UNSPECIFIED: ICD-10-CM

## 2024-02-08 DIAGNOSIS — Z98.890 OTHER SPECIFIED POSTPROCEDURAL STATES: Chronic | ICD-10-CM

## 2024-02-08 PROCEDURE — 90832 PSYTX W PT 30 MINUTES: CPT

## 2024-02-08 NOTE — PLAN
[FreeTextEntry2] : \par  Goal #1: Manage physical healthcare conditions and cope with related stress \par  Goal #1 Objective #1: Take medications/treatments as prescribed on a daily basis \par  Goal #1 Objective #2: Maintain good overall physical health and healthcare practices such as doing light exercises from chair every morning, and going out using her walker 2-3 times a week   [Cognitive and/or Behavior Therapy] : Cognitive and/or Behavior Therapy  [Motivational Interviewing] : Motivational Interviewing  [Supportive Therapy] : Supportive Therapy [de-identified] : Patient and the writer met via telehealth. She continues to be depressed due to her severe physical pain but remains positive and tries to rest as much as she can. Writer and patient practiced some deep breathing exercises and did them together during the session.  Patient reports a better week though and states she has had less anxiety which makes her happy. She states that she is still in the process of SSID application and every few days she has to go in and provide more paperwork to them. She hopes to get approved. Supportive therapy provided during session, goals discussed, and patient will continue to be seen bi weekly for therapy. Motivational interviewing used during session, patient will continue to take medications as prescribed and maintain physical health such as doing light exercise while sitting in a chair, going out for short walks and stretching while using her walker and start journaling her day-to-day feelings and activities and maintain positive self-talk Patient denies S/H/I or self-harming behaviors.  [Recommended Frequency of Visits: ____] : Recommended frequency of visits: [unfilled] [FreeTextEntry1] : 2-3 week sessions/.

## 2024-02-08 NOTE — REASON FOR VISIT
[Patient preference] : as per patient preference [Telehealth (audio & video) - Individual/Group] : This visit was provided via telehealth using real-time 2-way audio visual technology. [Medical Office: ([facility])___] : The provider was located at the medical office in [unfilled]. [Home] : The patient, [unfilled], was located at home, [unfilled], at the time of the visit. [Verbal consent obtained from patient/other participant(s)] : Verbal consent for telehealth/telephonic services obtained from patient/other participant(s) [FreeTextEntry4] : 12pm [FreeTextEntry5] : 12:17pm [FreeTextEntry2] : 6/6/23 [Patient] : Patient [FreeTextEntry1] : Anxiety/Depression/ therapy session

## 2024-02-09 DIAGNOSIS — F41.9 ANXIETY DISORDER, UNSPECIFIED: ICD-10-CM

## 2024-02-22 ENCOUNTER — APPOINTMENT (OUTPATIENT)
Dept: PSYCHIATRY | Facility: CLINIC | Age: 54
End: 2024-02-22

## 2024-02-27 ENCOUNTER — OUTPATIENT (OUTPATIENT)
Dept: OUTPATIENT SERVICES | Facility: HOSPITAL | Age: 54
LOS: 1 days | End: 2024-02-27
Payer: MEDICAID

## 2024-02-27 ENCOUNTER — APPOINTMENT (OUTPATIENT)
Dept: PSYCHIATRY | Facility: CLINIC | Age: 54
End: 2024-02-27

## 2024-02-27 DIAGNOSIS — F41.9 ANXIETY DISORDER, UNSPECIFIED: ICD-10-CM

## 2024-02-27 DIAGNOSIS — Z98.890 OTHER SPECIFIED POSTPROCEDURAL STATES: Chronic | ICD-10-CM

## 2024-02-27 PROCEDURE — 90832 PSYTX W PT 30 MINUTES: CPT

## 2024-02-27 NOTE — REASON FOR VISIT
[Patient preference] : as per patient preference [Medical Office: ([facility])___] : The provider was located at the medical office in [unfilled]. [Telehealth (audio & video) - Individual/Group] : This visit was provided via telehealth using real-time 2-way audio visual technology. [Home] : The patient, [unfilled], was located at home, [unfilled], at the time of the visit. [Verbal consent obtained from patient/other participant(s)] : Verbal consent for telehealth/telephonic services obtained from patient/other participant(s) [FreeTextEntry4] : 12pm [FreeTextEntry2] : 6/6/23 [FreeTextEntry5] : 12:17pm [FreeTextEntry1] : Anxiety/Depression/ therapy session [Patient] : Patient

## 2024-02-27 NOTE — PHYSICAL EXAM
[Cooperative] : cooperative [Anxious] : anxious [Full] : full [Clear] : clear [Linear/Goal Directed] : linear/goal directed [Memory] : memory [Average] : average [WNL] : within normal limits [2] : 2 - Occasional weeping. Spontaneously reports feeling states [AM] : occurs in AM [0] : 0 - Absent [4] : 4 - Requires physical assistance for dress, grooming, eating, or personal hygiene [Individual reports tobacco use during the last 30 days?] : Individual reports tobacco use during the last 30 days? No [1] : 1 - Indicates feelings of worthlessness (loss of self-esteem) only on questioning [Individual reports use of the following tobacco products during the last 30 days?] : Individual reports use of the following tobacco products during the last 30 days? No [Individual reports current use of tobacco cessation medication or nicotine replacement therapy?] : Individual reports current use of tobacco cessation medication or nicotine replacement therapy? No [FreeTextEntry1] : 12

## 2024-02-27 NOTE — PLAN
[Cognitive and/or Behavior Therapy] : Cognitive and/or Behavior Therapy  [FreeTextEntry2] : \par  Goal #1: Manage physical healthcare conditions and cope with related stress \par  Goal #1 Objective #1: Take medications/treatments as prescribed on a daily basis \par  Goal #1 Objective #2: Maintain good overall physical health and healthcare practices such as doing light exercises from chair every morning, and going out using her walker 2-3 times a week   [Supportive Therapy] : Supportive Therapy [Motivational Interviewing] : Motivational Interviewing  [de-identified] : Patient and the writer met via telehealth. Patient is doing well today considering the amount of physical pain she has at any given day. Patient takes the meds to help her with the pain and spends most of her times in bed. Writer together with the patient today worked on the depression and anxiety scale. Patient did score moderate to both. Tobacco screening also completed; patient does not smoke. Patient does have some great news she has been waiting for an answer for months. She finally got approved for SSID and is very happy about it. She states she can now plan to buy things she has put on hold for so long because she could not afford. Patient does feel like a burden to her family specifically her son who is her caretaker because he has to administer her meds, help her bathe and eat and move around.  Supportive therapy provided during session, goals discussed, and patient will continue to be seen bi weekly for therapy. Motivational interviewing used during session, patient will continue to take medications as prescribed and maintain physical health such as doing light exercise while sitting in a chair, going out for short walks and stretching while using her walker and start journaling her day-to-day feelings and activities and maintain positive self-talk Patient denies S/H/I or self-harming behaviors.  [Recommended Frequency of Visits: ____] : Recommended frequency of visits: [unfilled] [FreeTextEntry1] : 2-3 week sessions/.

## 2024-02-28 DIAGNOSIS — F41.9 ANXIETY DISORDER, UNSPECIFIED: ICD-10-CM

## 2024-02-29 ENCOUNTER — APPOINTMENT (OUTPATIENT)
Dept: PSYCHIATRY | Facility: CLINIC | Age: 54
End: 2024-02-29
Payer: MEDICAID

## 2024-02-29 ENCOUNTER — OUTPATIENT (OUTPATIENT)
Dept: OUTPATIENT SERVICES | Facility: HOSPITAL | Age: 54
LOS: 1 days | End: 2024-02-29
Payer: MEDICAID

## 2024-02-29 DIAGNOSIS — Z98.890 OTHER SPECIFIED POSTPROCEDURAL STATES: Chronic | ICD-10-CM

## 2024-02-29 DIAGNOSIS — F41.1 GENERALIZED ANXIETY DISORDER: ICD-10-CM

## 2024-02-29 DIAGNOSIS — F41.9 ANXIETY DISORDER, UNSPECIFIED: ICD-10-CM

## 2024-02-29 PROCEDURE — 99213 OFFICE O/P EST LOW 20 MIN: CPT | Mod: 95

## 2024-02-29 PROCEDURE — ZZZZZ: CPT

## 2024-02-29 NOTE — PLAN
[Medication education provided] : Medication education provided. [No] : No [Rationale for medication choices, possible risks/precautions, benefits, alternative treatment choices, and consequences of non-treatment discussed] : Rationale for medication choices, possible risks/precautions, benefits, alternative treatment choices, and consequences of non-treatment discussed with patient/family/caregiver  [FreeTextEntry5] : RTC 6 Weeks Continue regular therapy with Ardita Continue f/u with pain management  -c/w Paroxetine 30 mg daily -c/w Gabapentin 800 mg TID -c/w Quetiapine 25 mg QHS  Medication side effect profile was reviewed and discussed.  Patient was informed of the increased risk of dizziness and falls when combining Gabapentin with opioid analgesic medications.  She was also advised that Gabapentin can cause dose-dependent CNS depression and/or drowsiness when combined with opioid analgesics.  Fatal respiratory depression may also occur with concomitant opioid use.  Patient is aware and acknowledges the risk of the aforementioned.

## 2024-02-29 NOTE — REASON FOR VISIT
[Patient preference] : as per patient preference [Continuing, patient seen in-person within last 12 months] : Telehealth services are continuing as patient has been seen in-person within last 12 months. [Telephone (audio) - Individual/Group] : This telephonic visit was provided via audio only technology. [Other:___] : MATTHEW [Home] : The patient, [unfilled], was located at home, [unfilled], at the time of the visit. [Medical Office: ([facility])___] : The provider was located at the medical office in [unfilled]. [Verbal consent obtained from patient/other participant(s)] : Verbal consent for telehealth/telephonic services obtained from patient/other participant(s) [FreeTextEntry4] : 5488 [FreeTextEntry5] : 1200 [Patient] : Patient [FreeTextEntry2] : 8/4/23 [FreeTextEntry1] : Follow-up CC: "I'm okay"  The patient presented for OPD follow-up via telehealth.

## 2024-02-29 NOTE — PHYSICAL EXAM
[FreeTextEntry2] : unable to assess [FreeTextEntry4] : unable to assess [Euthymic] : euthymic [Cooperative] : cooperative [Full] : full [Clear] : clear [Linear/Goal Directed] : linear/goal directed [None] : none [None Reported] : none reported [Average] : average [WNL] : within normal limits [FreeTextEntry1] : unable to assess [de-identified] : unable to assess [de-identified] : unable to assess

## 2024-02-29 NOTE — HISTORY OF PRESENT ILLNESS
[FreeTextEntry1] : Marta is a 54-year-old [place] female,  with 5 children, unemployed, living with 2 of her adult children in [place], [place].  Marta has PPHx of MDD, KODY and PTSD and is currently on Paroxetine 30 mg daily, Gabapentin 800 mg TID, and Quetiapine 25 mg QHS.  She also has PMHx of chronic back, neck, and shoulder pain (s/p surgery x3, managed by a specialist), as well as asthma.  Her son, Roni, functions as a HHA through [facility] and provides her assistance with all ADLs/IADLs.  Marta presents today for follow-up appointment.    Today, Kirsty presents pleasant and conversational.  She informs that her sleep has vastly improved since initiation of Quetiapine 25 mg QHS and informs of getting 8-9 hours/night.  She denies the presence of any depressive symptoms or SI with intent or plan; however, informs of some anxiety when she receives correspondence from the housing authority, as she states that her finances are tight.  Aside from this, she does not experience baseline anxiety outside the context of normal everyday stressors.  She denies any adverse effects to her psychiatric medications at this time.  She informs she has an appointment with her pain management doctor in [place], [place] later this afternoon and states she is being transitioned off Percocet to medical marijuana for analgesia.  She will inform the treatment team of its efficacy moving forward.

## 2024-03-01 ENCOUNTER — NON-APPOINTMENT (OUTPATIENT)
Age: 54
End: 2024-03-01

## 2024-03-01 ENCOUNTER — OUTPATIENT (OUTPATIENT)
Dept: OUTPATIENT SERVICES | Facility: HOSPITAL | Age: 54
LOS: 1 days | End: 2024-03-01

## 2024-03-01 DIAGNOSIS — E55.9 VITAMIN D DEFICIENCY, UNSPECIFIED: ICD-10-CM

## 2024-03-01 DIAGNOSIS — Z00.00 ENCOUNTER FOR GENERAL ADULT MEDICAL EXAMINATION WITHOUT ABNORMAL FINDINGS: ICD-10-CM

## 2024-03-01 DIAGNOSIS — Z98.890 OTHER SPECIFIED POSTPROCEDURAL STATES: Chronic | ICD-10-CM

## 2024-03-01 DIAGNOSIS — F41.1 GENERALIZED ANXIETY DISORDER: ICD-10-CM

## 2024-03-02 DIAGNOSIS — E55.9 VITAMIN D DEFICIENCY, UNSPECIFIED: ICD-10-CM

## 2024-03-02 DIAGNOSIS — Z00.00 ENCOUNTER FOR GENERAL ADULT MEDICAL EXAMINATION WITHOUT ABNORMAL FINDINGS: ICD-10-CM

## 2024-03-08 ENCOUNTER — OUTPATIENT (OUTPATIENT)
Dept: OUTPATIENT SERVICES | Facility: HOSPITAL | Age: 54
LOS: 1 days | End: 2024-03-08
Payer: MEDICAID

## 2024-03-08 ENCOUNTER — APPOINTMENT (OUTPATIENT)
Dept: INTERNAL MEDICINE | Facility: CLINIC | Age: 54
End: 2024-03-08
Payer: MEDICAID

## 2024-03-08 VITALS — SYSTOLIC BLOOD PRESSURE: 152 MMHG | DIASTOLIC BLOOD PRESSURE: 79 MMHG

## 2024-03-08 VITALS
TEMPERATURE: 98 F | WEIGHT: 155 LBS | BODY MASS INDEX: 24.33 KG/M2 | HEART RATE: 66 BPM | OXYGEN SATURATION: 99 % | DIASTOLIC BLOOD PRESSURE: 76 MMHG | HEIGHT: 67 IN | SYSTOLIC BLOOD PRESSURE: 146 MMHG

## 2024-03-08 DIAGNOSIS — E55.9 VITAMIN D DEFICIENCY, UNSPECIFIED: ICD-10-CM

## 2024-03-08 DIAGNOSIS — F41.1 GENERALIZED ANXIETY DISORDER: ICD-10-CM

## 2024-03-08 DIAGNOSIS — Z98.890 OTHER SPECIFIED POSTPROCEDURAL STATES: Chronic | ICD-10-CM

## 2024-03-08 DIAGNOSIS — R73.03 PREDIABETES.: ICD-10-CM

## 2024-03-08 DIAGNOSIS — Z00.00 ENCOUNTER FOR GENERAL ADULT MEDICAL EXAMINATION WITHOUT ABNORMAL FINDINGS: ICD-10-CM

## 2024-03-08 DIAGNOSIS — J45.909 UNSPECIFIED ASTHMA, UNCOMPLICATED: ICD-10-CM

## 2024-03-08 LAB
ALBUMIN SERPL ELPH-MCNC: 4.4 G/DL
ALP BLD-CCNC: 138 U/L
ALT SERPL-CCNC: 17 U/L
ANION GAP SERPL CALC-SCNC: 11 MMOL/L
AST SERPL-CCNC: 13 U/L
BILIRUB SERPL-MCNC: 0.3 MG/DL
BUN SERPL-MCNC: 11 MG/DL
CALCIUM SERPL-MCNC: 8.9 MG/DL
CHLORIDE SERPL-SCNC: 102 MMOL/L
CHOLEST SERPL-MCNC: 157 MG/DL
CO2 SERPL-SCNC: 25 MMOL/L
CREAT SERPL-MCNC: 0.6 MG/DL
EGFR: 106 ML/MIN/1.73M2
ESTIMATED AVERAGE GLUCOSE: 128 MG/DL
GLUCOSE SERPL-MCNC: 98 MG/DL
HBA1C MFR BLD HPLC: 6.1 %
HCT VFR BLD CALC: 39.5 %
HDLC SERPL-MCNC: 49 MG/DL
HGB BLD-MCNC: 12.9 G/DL
LDLC SERPL CALC-MCNC: 86 MG/DL
MCHC RBC-ENTMCNC: 29.4 PG
MCHC RBC-ENTMCNC: 32.7 G/DL
MCV RBC AUTO: 90 FL
NONHDLC SERPL-MCNC: 108 MG/DL
PLATELET # BLD AUTO: 192 K/UL
PMV BLD AUTO: 0 /100 WBCS
PMV BLD: 12.9 FL
POTASSIUM SERPL-SCNC: 4.1 MMOL/L
PROT SERPL-MCNC: 7.1 G/DL
RBC # BLD: 4.39 M/UL
RBC # FLD: 12.6 %
SODIUM SERPL-SCNC: 138 MMOL/L
TRIGL SERPL-MCNC: 109 MG/DL
WBC # FLD AUTO: 5.85 K/UL

## 2024-03-08 PROCEDURE — 99214 OFFICE O/P EST MOD 30 MIN: CPT

## 2024-03-08 PROCEDURE — 99214 OFFICE O/P EST MOD 30 MIN: CPT | Mod: GC

## 2024-03-08 RX ORDER — SENNOSIDES 8.6 MG TABLETS 8.6 MG/1
8.6 TABLET ORAL
Qty: 30 | Refills: 6 | Status: ACTIVE | COMMUNITY
Start: 2021-07-13 | End: 1900-01-01

## 2024-03-08 RX ORDER — TRETINOIN 0.25 MG/G
0.03 CREAM TOPICAL
Qty: 1 | Refills: 5 | Status: COMPLETED | COMMUNITY
Start: 2022-11-29 | End: 2024-03-08

## 2024-03-08 RX ORDER — LIDOCAINE 5% 700 MG/1
5 PATCH TOPICAL
Qty: 30 | Refills: 0 | Status: ACTIVE | COMMUNITY
Start: 2022-03-10 | End: 1900-01-01

## 2024-03-08 RX ORDER — FLUCONAZOLE 150 MG/1
150 TABLET ORAL
Qty: 1 | Refills: 0 | Status: COMPLETED | COMMUNITY
Start: 2023-12-15 | End: 2024-03-08

## 2024-03-08 RX ORDER — OMEPRAZOLE 20 MG/1
20 CAPSULE, DELAYED RELEASE ORAL DAILY
Qty: 60 | Refills: 5 | Status: ACTIVE | COMMUNITY
Start: 2023-04-07 | End: 1900-01-01

## 2024-03-08 RX ORDER — BACLOFEN 10 MG/1
10 TABLET ORAL 3 TIMES DAILY
Qty: 90 | Refills: 2 | Status: COMPLETED | COMMUNITY
Start: 2023-03-13 | End: 2024-03-08

## 2024-03-08 RX ORDER — TERCONAZOLE 4 MG/G
0.4 CREAM VAGINAL
Qty: 1 | Refills: 0 | Status: COMPLETED | COMMUNITY
Start: 2023-12-15 | End: 2024-03-08

## 2024-03-08 RX ORDER — DOCUSATE SODIUM 100 MG/1
100 CAPSULE ORAL TWICE DAILY
Qty: 60 | Refills: 6 | Status: ACTIVE | COMMUNITY
Start: 2018-08-31 | End: 1900-01-01

## 2024-03-11 ENCOUNTER — APPOINTMENT (OUTPATIENT)
Dept: PSYCHIATRY | Facility: CLINIC | Age: 54
End: 2024-03-11

## 2024-03-12 DIAGNOSIS — R74.8 ABNORMAL LEVELS OF OTHER SERUM ENZYMES: ICD-10-CM

## 2024-03-12 DIAGNOSIS — R73.03 PREDIABETES: ICD-10-CM

## 2024-03-12 DIAGNOSIS — J45.909 UNSPECIFIED ASTHMA, UNCOMPLICATED: ICD-10-CM

## 2024-03-12 DIAGNOSIS — D13.1 BENIGN NEOPLASM OF STOMACH: ICD-10-CM

## 2024-03-12 DIAGNOSIS — K21.9 GASTRO-ESOPHAGEAL REFLUX DISEASE WITHOUT ESOPHAGITIS: ICD-10-CM

## 2024-03-12 DIAGNOSIS — M54.9 DORSALGIA, UNSPECIFIED: ICD-10-CM

## 2024-03-12 DIAGNOSIS — R30.0 DYSURIA: ICD-10-CM

## 2024-03-12 PROBLEM — E55.9 VITAMIN D DEFICIENCY: Status: ACTIVE | Noted: 2018-04-06

## 2024-03-12 NOTE — ASSESSMENT
[FreeTextEntry1] : 54-year-old female patient with MDD, KODY, PTSD, pre-DM, GERD, asthma, and History of fall in 2010 s/p spinal surgeries and lumbar fusion in 2018 and sp spinal stimulator presenting for follow up.   #Elevated BP -BP 140s/70s, repeat low 150s -Patient in pain right now -Recheck BP on next visit -No red flag symptoms  #Chronic back pain #Spinal surgeries with spinal stimulator #RIght adhesive capsulitis - Failed multiple attempts to resolve pain, now with chronic Sx - Following with neurosurgery and pain management - Status post multiple steroid injections - C/w prescribed pain meds- Percocet and medical cannabis as per pain medicine  #Dysuria- persistent- on and off- likely 2/2 Menopause - Empirically treat w/macrobid 100 twice per day for 3 day course in prior visit - Urien Cx showing strep B in urine (but was taken before getting the Abx as per patient) - Seems now more like itching and pain rather than lashanda dysuria - s/p Difulcan, no improvement - Repeat Urine Cx - Will see again in GYN clinic  #Isolated ALP elevation (138)- chronic -T.Ehrmann wnl -No RUQ sono in OP charts -Check RUQ sono, GGT and ALP isoenzyme  #GERD - c/w ppi  #Pre DM #HLD - last a1c 6.1 - Lipid profile with LDL 86 - Diet and lifestyle modifications  #Asthma - Continue Albuterol and fluticasone and montelukast - Keep off smoking  #Vitamin D insufficiency -Level 27- stable -c/w Vitamin D supplementation  #Left upper quadrant mole -Derm referral  #HO MDD #HO KODY #HO PTSD -Follows Psych -c/w Paroxetine, Gabapentin and Seroquel as per psych  #HO Gastric adenoma -Need yearly surveillance -GI referral- endorsed to patient  #HCM: - Colonoscopy will be done in 2026 - Mammogram oct 2023 (Birads 2), next in oct 2024 - Pap done in 2021 - Flu shot - RTC in 3 months

## 2024-03-12 NOTE — HISTORY OF PRESENT ILLNESS
[FreeTextEntry1] : Follow-up [de-identified] : 54-year-old female patient with MDD, KODY, PTSD, pre-DM, GERD, asthma, and History of fall in 2010 s/p spinal surgeries and lumbar fusion in 2018 and sp spinal stimulator presenting for follow up.   Chief complains: -Burning while urination, started 1 years ago, comes and goes, last time had burning 1 week ago and lasted for 4 days. UA was wnl in September 2023, Urine Culture growing Group B strep.  -Back pain in upper back radiating to right or left shoulder, since 2010 started after fall. Follows with Nsx and Pain management.  No other complains. Denies constipation, diarrhea, chest pain, abdominal pain or fever.

## 2024-03-14 ENCOUNTER — LABORATORY RESULT (OUTPATIENT)
Age: 54
End: 2024-03-14

## 2024-03-14 ENCOUNTER — OUTPATIENT (OUTPATIENT)
Dept: OUTPATIENT SERVICES | Facility: HOSPITAL | Age: 54
LOS: 1 days | End: 2024-03-14
Payer: MEDICAID

## 2024-03-14 ENCOUNTER — APPOINTMENT (OUTPATIENT)
Dept: INTERNAL MEDICINE | Facility: CLINIC | Age: 54
End: 2024-03-14
Payer: MEDICAID

## 2024-03-14 VITALS
WEIGHT: 156 LBS | HEART RATE: 71 BPM | SYSTOLIC BLOOD PRESSURE: 130 MMHG | BODY MASS INDEX: 24.48 KG/M2 | DIASTOLIC BLOOD PRESSURE: 78 MMHG | OXYGEN SATURATION: 98 % | HEIGHT: 67 IN | TEMPERATURE: 98.1 F

## 2024-03-14 DIAGNOSIS — Z98.890 OTHER SPECIFIED POSTPROCEDURAL STATES: Chronic | ICD-10-CM

## 2024-03-14 DIAGNOSIS — Z00.00 ENCOUNTER FOR GENERAL ADULT MEDICAL EXAMINATION WITHOUT ABNORMAL FINDINGS: ICD-10-CM

## 2024-03-14 DIAGNOSIS — N95.1 MENOPAUSAL AND FEMALE CLIMACTERIC STATES: ICD-10-CM

## 2024-03-14 DIAGNOSIS — N95.2 POSTMENOPAUSAL ATROPHIC VAGINITIS: ICD-10-CM

## 2024-03-14 DIAGNOSIS — Z01.419 ENCOUNTER FOR GYNECOLOGICAL EXAMINATION (GENERAL) (ROUTINE) W/OUT ABNORMAL FINDINGS: ICD-10-CM

## 2024-03-14 DIAGNOSIS — R39.9 UNSPECIFIED SYMPTOMS AND SIGNS INVOLVING THE GENITOURINARY SYSTEM: ICD-10-CM

## 2024-03-14 PROCEDURE — 87798 DETECT AGENT NOS DNA AMP: CPT

## 2024-03-14 PROCEDURE — 81001 URINALYSIS AUTO W/SCOPE: CPT

## 2024-03-14 PROCEDURE — G2211 COMPLEX E/M VISIT ADD ON: CPT | Mod: NC,1L

## 2024-03-14 PROCEDURE — 87801 DETECT AGNT MULT DNA AMPLI: CPT

## 2024-03-14 PROCEDURE — 88142 CYTOPATH C/V THIN LAYER: CPT

## 2024-03-14 PROCEDURE — 99396 PREV VISIT EST AGE 40-64: CPT

## 2024-03-14 PROCEDURE — 87086 URINE CULTURE/COLONY COUNT: CPT

## 2024-03-14 PROCEDURE — 87591 N.GONORRHOEAE DNA AMP PROB: CPT

## 2024-03-14 PROCEDURE — 87661 TRICHOMONAS VAGINALIS AMPLIF: CPT

## 2024-03-14 PROCEDURE — 87563 M. GENITALIUM AMP PROBE: CPT

## 2024-03-14 RX ORDER — CEPHALEXIN 500 MG/1
500 TABLET ORAL
Qty: 5 | Refills: 0 | Status: ACTIVE | COMMUNITY
Start: 2024-03-14 | End: 1900-01-01

## 2024-03-14 NOTE — PHYSICAL EXAM
[Appropriately responsive] : appropriately responsive [Alert] : alert [No Lymphadenopathy] : no lymphadenopathy [Regular Rate Rhythm] : regular rate rhythm [No Murmurs] : no murmurs [Clear to Auscultation B/L] : clear to auscultation bilaterally [Soft] : soft [Non-tender] : non-tender [Oriented x3] : oriented x3 [Non-distended] : non-distended [Examination Of The Breasts] : a normal appearance [Dry Mucosa] : dry mucosa [Normal] : normal

## 2024-03-15 PROBLEM — N95.2 ATROPHIC VAGINITIS: Status: ACTIVE | Noted: 2024-03-15

## 2024-03-15 RX ORDER — CONJUGATED ESTROGENS 0.62 MG/G
0.62 CREAM VAGINAL
Qty: 1 | Refills: 2 | Status: ACTIVE | COMMUNITY
Start: 2024-03-15 | End: 1900-01-01

## 2024-03-19 ENCOUNTER — OUTPATIENT (OUTPATIENT)
Dept: OUTPATIENT SERVICES | Facility: HOSPITAL | Age: 54
LOS: 1 days | End: 2024-03-19
Payer: MEDICAID

## 2024-03-19 DIAGNOSIS — Z98.890 OTHER SPECIFIED POSTPROCEDURAL STATES: Chronic | ICD-10-CM

## 2024-03-19 DIAGNOSIS — R74.8 ABNORMAL LEVELS OF OTHER SERUM ENZYMES: ICD-10-CM

## 2024-03-19 PROCEDURE — 76705 ECHO EXAM OF ABDOMEN: CPT | Mod: 26

## 2024-03-19 PROCEDURE — 76705 ECHO EXAM OF ABDOMEN: CPT

## 2024-03-20 ENCOUNTER — OUTPATIENT (OUTPATIENT)
Dept: OUTPATIENT SERVICES | Facility: HOSPITAL | Age: 54
LOS: 1 days | End: 2024-03-20
Payer: MEDICAID

## 2024-03-20 ENCOUNTER — APPOINTMENT (OUTPATIENT)
Dept: PSYCHIATRY | Facility: CLINIC | Age: 54
End: 2024-03-20

## 2024-03-20 DIAGNOSIS — R74.8 ABNORMAL LEVELS OF OTHER SERUM ENZYMES: ICD-10-CM

## 2024-03-20 DIAGNOSIS — Z98.890 OTHER SPECIFIED POSTPROCEDURAL STATES: Chronic | ICD-10-CM

## 2024-03-20 DIAGNOSIS — F41.9 ANXIETY DISORDER, UNSPECIFIED: ICD-10-CM

## 2024-03-20 DIAGNOSIS — F33.1 MAJOR DEPRESSIVE DISORDER, RECURRENT, MODERATE: ICD-10-CM

## 2024-03-20 PROCEDURE — 90832 PSYTX W PT 30 MINUTES: CPT

## 2024-03-21 DIAGNOSIS — F41.9 ANXIETY DISORDER, UNSPECIFIED: ICD-10-CM

## 2024-03-22 NOTE — PLAN
[Cognitive and/or Behavior Therapy] : Cognitive and/or Behavior Therapy  [FreeTextEntry2] : \par  Goal #1: Manage physical healthcare conditions and cope with related stress \par  Goal #1 Objective #1: Take medications/treatments as prescribed on a daily basis \par  Goal #1 Objective #2: Maintain good overall physical health and healthcare practices such as doing light exercises from chair every morning, and going out using her walker 2-3 times a week   [Supportive Therapy] : Supportive Therapy [de-identified] : Patient and the writer met via telehealth. Patient is doing well considering her physical pain she's in. Patient has arthritis, sciatica, back pain and is on med management and tries her best to relax and be positive. Patient has been having issues with SS office and income, she was trying to get SSD and was finally approved, but now states that she was told she owes money and is trying to appeal it. This is causing patient anxiety and hopes it will resolve with her not having to pay anything back. Patient states that her family is good, and she enjoys her grandkids company whenever they visit. Supportive therapy provided during session, goals discussed. Motivational interviewing used during session, patient will continue to take medications as prescribed and maintain physical health such as doing light exercise while sitting in a chair, going out for short walks and stretching while using her walker and start journaling her day-to-day feelings and activities and maintain positive self-talk Patient denies S/H/I or self-harming behaviors.  [Motivational Interviewing] : Motivational Interviewing  [Recommended Frequency of Visits: ____] : Recommended frequency of visits: [unfilled] [FreeTextEntry1] : 2-3 week sessions/.

## 2024-03-22 NOTE — REASON FOR VISIT
[Patient preference] : as per patient preference [Telehealth (audio & video) - Individual/Group] : This visit was provided via telehealth using real-time 2-way audio visual technology. [Verbal consent obtained from patient/other participant(s)] : Verbal consent for telehealth/telephonic services obtained from patient/other participant(s) [Medical Office: ([facility])___] : The provider was located at the medical office in [unfilled]. [Home] : The patient, [unfilled], was located at home, [unfilled], at the time of the visit. [FreeTextEntry5] : 11:30am [FreeTextEntry4] : 11am [Patient] : Patient [FreeTextEntry2] : 6/6/23 [FreeTextEntry1] : Anxiety/Depression/ therapy session

## 2024-03-22 NOTE — RISK ASSESSMENT
DC xarelto and I will let the PCP know she should get an G&H in one week. LFTs are back to normal as is kidney funtion. Thank you. I will document this in my DC note and will fax it to you shortly. [No, patient denies ideation or behavior] : No, patient denies ideation or behavior [Low acute suicide risk] : Low acute suicide risk [No] : No [Not clinically indicated] : Safety Plan completed/updated (for individuals at risk): Not clinically indicated

## 2024-03-25 DIAGNOSIS — R30.0 DYSURIA: ICD-10-CM

## 2024-03-25 DIAGNOSIS — N95.2 POSTMENOPAUSAL ATROPHIC VAGINITIS: ICD-10-CM

## 2024-03-25 DIAGNOSIS — Z01.419 ENCOUNTER FOR GYNECOLOGICAL EXAMINATION (GENERAL) (ROUTINE) WITHOUT ABNORMAL FINDINGS: ICD-10-CM

## 2024-03-25 PROBLEM — R39.9 UTI SYMPTOMS: Status: ACTIVE | Noted: 2019-09-17

## 2024-03-25 PROBLEM — N95.1 MENOPAUSAL VAGINAL DRYNESS: Status: ACTIVE | Noted: 2024-03-14

## 2024-03-25 NOTE — HISTORY OF PRESENT ILLNESS
[Patient reported mammogram was normal] : Patient reported mammogram was normal [FreeTextEntry1] : 54-year-old female patient with MDD, KODY, PTSD, pre-DM, GERD, asthma, and History of fall in 2010 s/p spinal surgeries and lumbar fusion in 2018 and sp spinal stimulator presenting for follow up.  Chief complains: -Burning while urination, started 1 years ago, comes and goes, last time had burning 1 week ago and lasted for 4 days. UA was wnl in September 2023, Urine Culture growing Group B strep.   Patient was sent for a repeat urine culture. However, she never did the repeat cultures. She is still endorsing lower abdominal pain associated with dysuria.  [Mammogramdate] : 09/28/23

## 2024-03-25 NOTE — PLAN
[FreeTextEntry1] : #Dysuria- persistent- on and off- likely 2/2 Menopause - Empirically treat w/macrobid 100 twice per day for 3 day course in prior visit - Urien Cx showing strep B in urine (but was taken before getting the Abx as per patient) - Seems now more like itching and pain rather than lashanda dysuria - s/p Difulcan, no improvement - will send Keflex 500 mg BID for 5 days  - Repeat Urine Cx  #Annual PAP sent  - PAP and HPV sent  - Acute vaginitis panel sent

## 2024-04-10 NOTE — ASU PREOP CHECKLIST - SITE MARKED BY ANESTHESIOLOGIST
Hematology/Oncology Follow Up    Date of Visit: 04/10/24  CC: follow up  Metastatic breast cancer  Metastases to bone  Cancer related pain  Left rib pathologic fractures    HISTORY OF PRESENT ILLNESS:  Ms. Aida Nuno is a 74 year old female patient with history of aortic valve regurgitation, CBD, bowel obstruction , CVA , hypertension, hyperlipidemia, hypothyroidism, meningioma, PFO, sarcoidosis, seizure 2019.     Patient presented to the emergency department on 2024 with shortness of breath.  Chest x-ray no acute findings.  Elevated D-dimer 1.55. V/Q scan intermediate probability of PE.  She was admitted for possible PE and her shortness of breath.  She underwent CTA chest PE protocol which did not show any acute PE but did show innumerable sclerotic lesions throughout osseous structures, most pronounced in thoracic spine concerning for osseous metastatic disease.  A CT scan of the abdomen and pelvis with contrast showed similar findings.  There was no evidence of primary malignancy in the abdomen or pelvis and the liver appeared normal. Patient denies any recent illnesses, nightsweats, fevers, chills, lymph node enlargement or masses, breast concerns, abd pain, nausea, vomiting, wt loss, appetite changes. Her shortness of breath is about the same since admission. Left sided chest pain is improved. She denies back pain or extremity weakness. Reports Fhx colon cancer in her mother and pancreatic cancer in her father.    2024:  Biopsy of bone right iliac crest pathology shows metastatic poorly differentiated carcinoma.  Not entirely specific for a primary site.  Most likely possibilities breast, gyn tract, renal cell/kidney.  Pulmonary and urothelial primary sites are not entirely excluded as well as upper gastrointestinal and pancreatic or biliary.    [employer] tumor of origin and NGS testin% probability of breast cancer. NGS gene: 8975^PIK3CA^HGNC Variant: p.E545K. RUNX1 [id number]  frameshift. TMB 2.6 m/MB stable. PD-L1 CPS 0.     03/18/2024: PET/CT Widespread osseous metastatic disease.  Primary lesion remains occult.   Uptake in several lateral left ribs consistent with healing fracture.    03/27/2024: CA 27.29 of 5,239. CEA of 41.9. CA 19-9, , B-hcg, AFP WNL.      Interval/Subjective  Patient presents accompanied by her 3 family members today.  She reports continued pain in the left rib area.  The pain is sharp and severity 7 or 8/10.  She has been taking oxycodone 5 mg as needed but reports this does not help with her pain.  She has no other areas of pain and denies back pain.  Her shortness of breath is still present.  She denies any lower extremity edema.    Agree with 12 systems review as documented by MA. Otherwise as per interval/subjective above.    Past Medical History:   Diagnosis Date    Aortic valve regurgitation 10/2010    mod    Ascending aortic aneurysm (CMD)     repaired 6/12    Bowel obstruction (CMD) 09/2008    adhesions    Colovaginal fistula 12/2005    COPD (chronic obstructive pulmonary disease) (CMD)     CVA (cerebral vascular accident) (CMD) 10/10 and 2011    right, prob cryptogenic, slight left sided weakness remains    Esophageal dysmotility 11/2020    Per barium swallow    Hematuria 11/1998    w/u neg    HTN (hypertension)     Hyperlipidemia     Hypothyroidism (acquired) TSH 4/21    Macular hole, left eye 12/2017    Meniere disease     Meningioma (CMD) 09/2011    left medial fossa, right cerebellum    Patent foramen ovale 09/2011    closed 6/12    Pseudogout     left knee    Right-sided Bell's palsy     Sarcoidosis 1970's    lung/skin    Seizure (CMD) 11/2019    ? meningioma surg?    Shingles 06/1996    right inguinal       Past Surgical History:   Procedure Laterality Date    Abdominal adhesion surgery  09/21/2008    Small bowel adhesiolysis for bowel obstruction    Ascending aortic aneurysm repair  06/15/2012    Repair and replacement of ascending aortic  aneurysm with a 30-mm Hemashield tube graft /     Brain meningioma excision Left 2018    Breast mass excision Right     Cardiac surgery  06/15/2012    Closure of patent foramen ovale    Cataract extraction, bilateral Bilateral     Cervical laminoplasty  2019    Colectomy  2005    Anterior sigmoid resection, excision of fistulous tract, closure of vaginal cuff and during the procedure repair of the left ureter with placement of a stent in the ureter    Colonoscopy  2005    Colonoscopy  2016    neg    Colonoscopy      Cystoscopy w/ ureteral stent removal Left 01/10/2006    Cystoscopy, left retrograde pyelogram with stent removal    Esophagogastroduodenoscopy  2016    neg    Eye surgery Left 2018    macula repair    Hernia repair  2005    Ventral Hernia repaired during colectomy surgery    Hysterectomy  1993    ANISH w BSO and adhesiolysis    Left heart cath,percutaneous  2012    preop thoracic aneurysm repair    Oophorectomy      Total thyroidectomy  2002    benign    Ventral hernia repair  2008    Wrist fracture surgery Right 03/15/2017    Open reduction, internal fixation with Biomet-Yann volar plate     OB History    Para Term  AB Living   1 1 0 0 0 0   SAB IAB Ectopic Molar Multiple Live Births   0 0 0 0 0 1       ALLERGIES:  is allergic to contrast media, iodine   (environmental or med), penicillins, simvastatin, and sulfa drugs cross reactors.    MEDICATIONS:  Current Outpatient Medications   Medication Sig    oxyCODONE, IMM REL, (ROXICODONE) 5 MG immediate release tablet Take 1-2 tablets by mouth every 4 hours as needed for Pain.    Synthroid 88 MCG tablet TAKE 1 TABLET BY MOUTH EVERY DAY    traMADol (ULTRAM) 50 MG tablet Take 1 tablet by mouth every 6 hours as needed for Pain. (Patient not taking: Reported on 4/10/2024)    predniSONE (DELTASONE) 10 MG tablet Take 4 tabs daily for 3 days, then 2 tabs daily for 3 days,  then 1 tab daily for 3 days then stop (Patient not taking: Reported on 4/10/2024)    dicyclomine (BENTYL) 20 MG tablet Take 20 mg by mouth at bedtime.    docusate sodium-sennosides (SENOKOT S) 50-8.6 MG per tablet Take 3 tablets by mouth nightly.    Multiple Vitamins-Minerals (vitamin - therapeutic multivitamins w/minerals) tablet Take 1 tablet by mouth daily.    albuterol 108 (90 Base) MCG/ACT inhaler Inhale 2 puffs into the lungs every 4 hours as needed for Shortness of Breath or Wheezing.    budesonide-formoterol (Symbicort) 160-4.5 MCG/ACT inhaler Inhale 2 puffs into the lungs in the morning and 2 puffs in the evening.    metoPROLOL succinate (TOPROL-XL) 25 MG 24 hr tablet TAKE 1 TABLET BY MOUTH EVERY DAY    Cyanocobalamin (B-12) 1000 MCG Tab Take 1 tablet by mouth daily.    furosemide (LASIX) 20 MG tablet TAKE 1 TABLET BY MOUTH EVERY DAY    levETIRAcetam (KEPPRA) 750 MG tablet Take 1,500 mg by mouth in the morning and 1,500 mg in the evening. Indications: epilepsy.    aspirin 81 MG tablet Take 1 tablet by mouth daily.     No current facility-administered medications for this visit.       Social History     Socioeconomic History    Marital status: /Civil Union     Spouse name: Not on file    Number of children: Not on file    Years of education: Not on file    Highest education level: Not on file   Occupational History    Not on file   Tobacco Use    Smoking status: Former     Current packs/day: 0.00     Average packs/day: 1 pack/day for 34.0 years (34.0 ttl pk-yrs)     Types: Cigarettes     Start date: 1978     Quit date: 2012     Years since quittin.8    Smokeless tobacco: Never    Tobacco comments:     packet declined   Vaping Use    Vaping status: never used   Substance and Sexual Activity    Alcohol use: Not Currently     Alcohol/week: 0.0 standard drinks of alcohol     Comment: social    Drug use: No    Sexual activity: Yes   Other Topics Concern    Not on file   Social History  Narrative    Not on file     Social Determinants of Health     Financial Resource Strain: Low Risk  (3/8/2024)    Financial Resource Strain     Unable to Get: None   Food Insecurity: Low Risk  (3/8/2024)    Food Insecurity     Worried about Food: Never true     Food is Gone: Never true   Transportation Needs: Not At Risk (3/8/2024)    Transportation Needs     Lack of Reliable Transportation: No   Physical Activity: Not on file   Stress: Not on file   Social Connections: Low Risk  (3/8/2024)    Social Connections     Social Connectivity: 5 or more times a week   Interpersonal Safety: Low Risk  (3/8/2024)    Interpersonal Safety     How often physically hurt: Never     How often insulted or talked down to: Never     How often threatened with harm: Never     How often scream or curse at: Never     Quit tobacco 2012  No ETOH use      Family Status   Relation Name Status    Mo   at age 64        colon Ca    Fa   at age 91        pancreatic cancer    Sis   at age 70    Sis  Alive    Sis  Alive    Bro  Alive        DM    Son  Alive    MGMo  Alive       REVIEW OF SYSTEMS:   A 12 point review of systems was reviewed with the patient.  Pertinent positive and negative results are discussed above.  The entire review of systems is detailed in the Epic electronic health record.    PHYSICAL EXAMINATION:      VITALS:  Visit Vitals  /60   Pulse 88   Temp 97 °F (36.1 °C) (Oral)   Resp 18   Wt 79.9 kg (176 lb 3.2 oz)   SpO2 98%   BMI 30.24 kg/m²          ECO - Fully active, able to carry on all pre-disease activities without restrictions.    CONSTITUTIONAL:  Alert, cooperative, oriented.  Mood and affect appropriate.  Appears close to chronological age.  Well nourished.  Well developed.   HEENT:  Normocephalic, atraumatic.  Pupils are equal, round, reactive to light.  Extraocular movements are intact.  Sclerae anicteric.  Conjunctivae and corneas are clear. Oropharynx and oral cavity are  unremarkable.  MUSCULOSKELETAL: Left lateral chest wall tenderness. Spinous processes non-tender throughout C/T/L spine palpation.  SKIN:  Skin examination is without rash or lesions.   PULMONARY:  Clear to auscultation without wheeze or rhonchi  CARDIOVASCULAR:  Cardiac examination shows a regular rate and rhythm.  Normal S1 and S2.  No murmur, rub or gallop.  EXTREMITIES:  Extremities are without clubbing, cyanosis. Trace bilateral LE edema non-pitting, symmetric.   NEUROLOGIC:  Alert and oriented x 3.  Cranial nerves II-XII grossly intact.  PSYCHIATRIC:  Appropriate affect. Coherent speech.      LABS:  WBC (K/mcL)   Date Value   03/09/2024 21.4 (H)     RBC (mil/mcL)   Date Value   03/09/2024 3.82 (L)     HCT (%)   Date Value   03/09/2024 36.5     HGB (g/dL)   Date Value   03/09/2024 12.0     PLT (K/mcL)   Date Value   03/09/2024 202       RADIOLOGICAL DATA:    03/18/2024 PET/CT  Widespread osseous metastatic disease.  Primary lesion remains occult.   Uptake in several lateral left ribs consistent with healing fracture.    PATHOLOGY  03/11/2024:  Cytologic Interpretation   Bone, right iliac crest, core biopsies and touch imprint:   -Metastatic poorly differentiated carcinoma, see comment.     NGS:  3/11/2024 [employer] NGS & tumor of Origin          ASSESSMENT:  Ms. Aida Nuno is a 74 year old female patient history of aortic valve regurgitation, CBD, bowel obstruction 2008, CVA 2011, hypertension, hyperlipidemia, hypothyroidism, meningioma, PFO, sarcoidosis, seizure 2019, admitted with chest pain and shortness of breath. Scans incidentally showing sclerotic bone lesions throughout axial spine. She underwent 03/11/2024 biopsy of bone right iliac crest pathology shows metastatic poorly differentiated carcinoma. Pathology was essentially carcinoma of unknown primary (CUP). Her tissue was sent for [employer] tumor of origin testing which reveals 98% probability of breast cancer and significant for PIK3CA mutation. Her  clinical picture seems consistent with this as does the PIK3CA mutation. We are awaiting ER/OH/HER2 IHC stains to determine breast cancer receptor status. Her PET/CT showed widespread osseous metastatic disease.     Met with patient and her family members today. We reviewed her initial workup and the frustrations regarding her initial diagnosis of CUP. Fortunately the [employer] testing and NGS results point strongly towards a diagnosis of breast cancer. We discussed metastatic diagnosis and this being incurable. Waiting for ER/OH/HER2 testing to determine which type of treatment options there are. I shared with them my hope that this is a slow growing ER/OH positive hormonally driven tumor so that we can use two pills (AI + CDK4/6i) which are often very effective and provide long PFS. She is symptomatic with left pathologic rib fractures but does not really have back pain. We reviewed role of bisphosphonate and recommended she get dental clearance first. Patient and family members have no further questions at this time.      #Stage IV Breast Cancer, metastatic to bones  - [facility] tumor of origin and NGS testin% probability of breast cancer. NGS gene: 8975^PIK3CA^HGNC Variant: p.E545K. RUNX1 [id number] frameshift. TMB 2.6 m/MB stable. PD-L1 CPS 0.   - pending ER/OH/HER2 stains  - PIK3CA mutation for second line possibly can use Alpelisib + Fulvestrant  - if ER/OH positive and HER2 negative we would choose AI + CDK4/6 inhibitor and start ASAP.  - counseled regarding incurable cancer and rationale for palliative chemotherapy/treatments  - can trend CA 27.29 and CEA with treatments as these were both elevated on diagnosis    #Bone Metastases  - dental clearance  - vit D and calcium  - once she has dental clearance we can consider bisphosphonate Zometa q12w    #Cancer Related Pain  #Pathologic Left Rib Fractures  - oxycodone 5mg as needed ineffective. Counseled to take 1.5 or 2 pills (7.5 to 10mg) and see if this is more  effective.  - suspect neuropathic pain component of sharp pains and due to metastatic cancer infiltration of bony areas. We discussed gabapentin or lyrica could be future options but lets see how oxycodone at higher dose dose. Anticipate improvements once systemic cancer treatment is initiated.    #Genetics  - service to genetics    #History of sarcoidosis    61 minutes total time spent on the day of the encounter in preparing to see the patient, performing a medical appropriate examination and/or evaluation, counseling and educating the patient/family/caregiver, documenting clinical information, obtaining and/or reviewing separately obtained history, ordering medications, tests, or procedures, independently interpreting results, referring and communicating with other health care professionals and care coordination.    Chad Glisch, MD  Hematology & Medical Oncology     n/a

## 2024-04-11 ENCOUNTER — APPOINTMENT (OUTPATIENT)
Dept: PSYCHIATRY | Facility: CLINIC | Age: 54
End: 2024-04-11
Payer: MEDICAID

## 2024-04-11 ENCOUNTER — OUTPATIENT (OUTPATIENT)
Dept: OUTPATIENT SERVICES | Facility: HOSPITAL | Age: 54
LOS: 1 days | End: 2024-04-11
Payer: MEDICAID

## 2024-04-11 DIAGNOSIS — Z98.890 OTHER SPECIFIED POSTPROCEDURAL STATES: Chronic | ICD-10-CM

## 2024-04-11 DIAGNOSIS — F41.1 GENERALIZED ANXIETY DISORDER: ICD-10-CM

## 2024-04-11 PROCEDURE — ZZZZZ: CPT

## 2024-04-11 PROCEDURE — 99213 OFFICE O/P EST LOW 20 MIN: CPT | Mod: 95

## 2024-04-11 NOTE — PLAN
[No] : No [Medication education provided] : Medication education provided. [Rationale for medication choices, possible risks/precautions, benefits, alternative treatment choices, and consequences of non-treatment discussed] : Rationale for medication choices, possible risks/precautions, benefits, alternative treatment choices, and consequences of non-treatment discussed with patient/family/caregiver  [FreeTextEntry5] : RTC 6 Weeks Continue regular therapy with Ardita Continue f/u with pain management  -c/w Paroxetine 30 mg daily -c/w Gabapentin 800 mg TID -c/w Quetiapine 25 mg QHS  Medication side effect profile was reviewed and discussed.  Patient was informed of the increased risk of dizziness and falls when combining Gabapentin with opioid analgesic medications.  She was also advised that Gabapentin can cause dose-dependent CNS depression and/or drowsiness when combined with opioid analgesics.  Fatal respiratory depression may also occur with concomitant opioid use.  Patient is aware and acknowledges the risk of the aforementioned.

## 2024-04-11 NOTE — PSYCHOSOCIAL ASSESSMENT
[None known] : None known [Yes (select details below)] : Have you ever experienced this type of event? Yes [had nightmares about the event(s) or thought about the event(s) when you did not want] : did not have nightmares and/or unwanted thoughts about the events [tried hard not to think about the event(s) or went out of your way to avoid situations that reminded you of the event] : did not need to avoid thinking about events, did not need to avoid situations that might remind patient of events [has been constantly on guard, watchful, or easily startled] : has not been constantly on guard, watchful, or easily startled [felt numb or detached from people, activities, or your surrounding] : has not felt numb or detached from people, activities, or surroundings [felt guilty or unable to stop blaming yourself or others for the event(s) or any problems the event(s) may have caused] : has not felt guilty or unable to stop blaming self or others for event(s), or any problems the event(s) may have caused 1.81

## 2024-04-11 NOTE — REASON FOR VISIT
[Patient preference] : as per patient preference [Continuing, patient seen in-person within last 12 months] : Telehealth services are continuing as patient has been seen in-person within last 12 months. [Telephone (audio) - Individual/Group] : This telephonic visit was provided via audio only technology. [Other:___] : MATTHEW [Medical Office: ([facility])___] : The provider was located at the medical office in [unfilled]. [Home] : The patient, [unfilled], was located at home, [unfilled], at the time of the visit. [Verbal consent obtained from patient/other participant(s)] : Verbal consent for telehealth/telephonic services obtained from patient/other participant(s) [FreeTextEntry4] : 3014 [FreeTextEntry5] : 1200 [FreeTextEntry2] : 8/4/23 [Patient] : Patient [FreeTextEntry1] : Follow-up CC: "Everything is great"  The patient presented for OPD follow-up via telehealth.

## 2024-04-11 NOTE — HISTORY OF PRESENT ILLNESS
[FreeTextEntry1] : Marta is a 54-year-old [place] female,  with 5 children, unemployed, living with 2 of her adult children in [place], [place].  Marta has PPHx of MDD, KODY and PTSD and is currently on Paroxetine 30 mg daily, Gabapentin 800 mg TID, and Quetiapine 25 mg QHS.  She also has PMHx of chronic back, neck, and shoulder pain (s/p surgery x3, managed by a specialist), as well as asthma.  She takes Percocet 5-325 mg BID and uses medical cannabis for pain management.  Her son, Roni, functions as a HHA through [facility] and provides her assistance with all ADLs/IADLs.  Marta presents today for follow-up appointment.    Today, Kirsty presents pleasant, happy, and conversational.  She smiles throughout the course of assessment.  Marta informs that her baseline mood has been good, rating it 10/10 in nature, and states that sleep continues to be exceptional with her Seroquel regimen.  She denies the presence of any depressive or anxious symptoms at this time and denies any adverse effects to her psychiatric medications.  She has no acute complaints.

## 2024-04-12 ENCOUNTER — APPOINTMENT (OUTPATIENT)
Dept: PSYCHIATRY | Facility: CLINIC | Age: 54
End: 2024-04-12

## 2024-04-12 ENCOUNTER — OUTPATIENT (OUTPATIENT)
Dept: OUTPATIENT SERVICES | Facility: HOSPITAL | Age: 54
LOS: 1 days | End: 2024-04-12
Payer: MEDICAID

## 2024-04-12 DIAGNOSIS — Z98.890 OTHER SPECIFIED POSTPROCEDURAL STATES: Chronic | ICD-10-CM

## 2024-04-12 DIAGNOSIS — F41.1 GENERALIZED ANXIETY DISORDER: ICD-10-CM

## 2024-04-12 LAB
25(OH)D3 SERPL-MCNC: 27 NG/ML
A VAGINAE DNA VAG QL NAA+PROBE: NORMAL
BACTERIA UR CULT: NORMAL
BVAB2 DNA VAG QL NAA+PROBE: NORMAL
C KRUSEI DNA VAG QL NAA+PROBE: NEGATIVE
C KRUSEI DNA VAG QL NAA+PROBE: NEGATIVE
CYTOLOGY CVX/VAG DOC THIN PREP: ABNORMAL
M GENITALIUM DNA SPEC QL NAA+PROBE: NEGATIVE
M HOMINIS DNA SPEC QL NAA+PROBE: NEGATIVE
MEGA1 DNA VAG QL NAA+PROBE: NORMAL
T VAGINALIS RRNA SPEC QL NAA+PROBE: NEGATIVE
TSH SERPL-ACNC: 1.14 UIU/ML
UREAPLASMA DNA SPEC QL NAA+PROBE: POSITIVE

## 2024-04-12 PROCEDURE — 90832 PSYTX W PT 30 MINUTES: CPT

## 2024-04-12 RX ORDER — ELECTROLYTES/DEXTROSE
SOLUTION, ORAL ORAL DAILY
Qty: 30 | Refills: 5 | Status: ACTIVE | COMMUNITY
Start: 2024-03-14 | End: 1900-01-01
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
## 2024-04-12 NOTE — REASON FOR VISIT
[Patient preference] : as per patient preference [Telehealth (audio & video) - Individual/Group] : This visit was provided via telehealth using real-time 2-way audio visual technology. [Medical Office: ([facility])___] : The provider was located at the medical office in [unfilled]. [Home] : The patient, [unfilled], was located at home, [unfilled], at the time of the visit. [Verbal consent obtained from patient/other participant(s)] : Verbal consent for telehealth/telephonic services obtained from patient/other participant(s) [FreeTextEntry4] : 11am [FreeTextEntry5] : 11:30am [FreeTextEntry2] : 6/6/23 [Patient] : Patient [FreeTextEntry1] : Anxiety/Depression/ therapy session

## 2024-04-13 DIAGNOSIS — F41.1 GENERALIZED ANXIETY DISORDER: ICD-10-CM

## 2024-04-25 ENCOUNTER — NON-APPOINTMENT (OUTPATIENT)
Age: 54
End: 2024-04-25

## 2024-04-25 ENCOUNTER — OUTPATIENT (OUTPATIENT)
Dept: OUTPATIENT SERVICES | Facility: HOSPITAL | Age: 54
LOS: 1 days | End: 2024-04-25
Payer: MEDICAID

## 2024-04-25 ENCOUNTER — APPOINTMENT (OUTPATIENT)
Dept: DERMATOLOGY | Facility: CLINIC | Age: 54
End: 2024-04-25

## 2024-04-25 DIAGNOSIS — Z00.00 ENCOUNTER FOR GENERAL ADULT MEDICAL EXAMINATION WITHOUT ABNORMAL FINDINGS: ICD-10-CM

## 2024-04-25 DIAGNOSIS — Z98.890 OTHER SPECIFIED POSTPROCEDURAL STATES: Chronic | ICD-10-CM

## 2024-04-25 PROCEDURE — 99213 OFFICE O/P EST LOW 20 MIN: CPT

## 2024-04-25 RX ORDER — KETOCONAZOLE 20.5 MG/ML
2 SHAMPOO, SUSPENSION TOPICAL
Qty: 1 | Refills: 0 | Status: ACTIVE | COMMUNITY
Start: 2024-04-25 | End: 1900-01-01

## 2024-04-26 NOTE — ASSESSMENT
[FreeTextEntry1] : 53 yo F w/ pmhx of preDM, GERD, asthma:  #Seborrheic keratosis on R shoulder: - benign/ OD/ monitor  #Infl. Warts x 2 on R lateral forehead hairline and L upper back:  - options, risks, benefits and alternatives discussed, including destruction and biopsy.  Patient prefers destruction with liquid nitrogen this visit, done 4 seconds twice for the 2 lesions after SE discussed including risk of swelling, bleeding, infection, scar and recurrence, Bacitracin and instructions given.    #Seborrheic dermatitis with h/o Hair loss/ Alopecia with component of Traction and Androgenetic, negative hair pull mabel:  - counseled patient on reducing hair traction by not wearing hair tightly pulled back - vitamin d supplementation with MVI once a day until he sees the PMD - prescribed ketoconazole shampoo TIW  return to clinic in 4-6 months or as needed.

## 2024-04-26 NOTE — PHYSICAL EXAM
[FreeTextEntry3] : - verrucous eryth papules on the R lateral forehead hairline and L upper back with thrombosed capillaries on dermoscopy, no features suspicious for skin cancer;   - verrucous hyperpigmented papule on R shoulder ~ 5 mm - flaky patches diffusely scalp, negative hair pull test

## 2024-04-26 NOTE — HISTORY OF PRESENT ILLNESS
[FreeTextEntry1] : Follow up [de-identified] : 55 yo F w/ pmhx of preDM, GERD, asthma presents to derm clinic for follow-up of seb derm and hair loss using head and shoulder shampoo still c/o of flaky patches with some hair loss. States has not been tying her hair in a tight ponytail. Patient also reports itchy lesions  on R lateral forehead hairline, R shoulder and L upper back irritated by clothing. Lesion on the L t sometimes bleed.  No h/o skin cancers. No other skin complaints.

## 2024-04-30 DIAGNOSIS — L82.1 OTHER SEBORRHEIC KERATOSIS: ICD-10-CM

## 2024-04-30 DIAGNOSIS — L21.9 SEBORRHEIC DERMATITIS, UNSPECIFIED: ICD-10-CM

## 2024-04-30 DIAGNOSIS — B07.9 VIRAL WART, UNSPECIFIED: ICD-10-CM

## 2024-05-02 ENCOUNTER — APPOINTMENT (OUTPATIENT)
Dept: PSYCHIATRY | Facility: CLINIC | Age: 54
End: 2024-05-02

## 2024-05-02 ENCOUNTER — OUTPATIENT (OUTPATIENT)
Dept: OUTPATIENT SERVICES | Facility: HOSPITAL | Age: 54
LOS: 1 days | End: 2024-05-02

## 2024-05-02 DIAGNOSIS — F41.9 ANXIETY DISORDER, UNSPECIFIED: ICD-10-CM

## 2024-05-02 NOTE — REASON FOR VISIT
[Patient preference] : as per patient preference [Telehealth (audio & video) - Individual/Group] : This visit was provided via telehealth using real-time 2-way audio visual technology. [Medical Office: ([facility])___] : The provider was located at the medical office in [unfilled]. [Home] : The patient, [unfilled], was located at home, [unfilled], at the time of the visit. [Verbal consent obtained from patient/other participant(s)] : Verbal consent for telehealth/telephonic services obtained from patient/other participant(s) [FreeTextEntry4] : 11am [FreeTextEntry5] : 11:30am [FreeTextEntry2] : 6/6/23 [Patient] : Patient [FreeTextEntry1] : Anxiety/Depression/ therapy session

## 2024-05-02 NOTE — PLAN
[FreeTextEntry2] : \par  Goal #1: Manage physical healthcare conditions and cope with related stress \par  Goal #1 Objective #1: Take medications/treatments as prescribed on a daily basis \par  Goal #1 Objective #2: Maintain good overall physical health and healthcare practices such as doing light exercises from chair every morning, and going out using her walker 2-3 times a week   [Cognitive and/or Behavior Therapy] : Cognitive and/or Behavior Therapy  [Motivational Interviewing] : Motivational Interviewing  [Supportive Therapy] : Supportive Therapy [de-identified] : Patient was seen via telehealth this morning. Patient is doing well, she was at home in her living room resting. Patient has been able to do some activities lately, arts and crafts in her house. She has a big mural wall that she loves to paint and add cartoons and characters into it. Patient states that she has been in a much better mood since she started to work on her arts and crafts, which she has not done in a long time. Patient reports that her social security disability money has been coming in monthly and is going well. She has some retro pay that they owe her and she's waiting for that money to come in soon. Patient reports that her anxiety has been on and off due to her physical pain and complications. Patient has limited physical movements due to her back pain, multiple back surgeries, and is on pain management medication. Therefore, she cannot be moving so much around, although she tries her best to walk around the house. Patient is encouraged to go outside whenever she can with the help of her daughter or her son using the cane or the walker, get some sun and enjoy the outdoors. Patient is receptive. During the session, supportive therapy was provided, motivational interviewing as well. Patient will continue to take the medications as prescribed and maintain physical health, such as doing light exercises in the house, like using a chair, or doing yoga, using the YouTube channel, going for short walks whenever she has a chance and the availability of her children. Patient will continue to work on her arts and crafts, which she reports she enjoys and finds it helps with her anxiety. Patient denies any suicidal, homicidal ideations or any self-harming behaviors.  [Recommended Frequency of Visits: ____] : Recommended frequency of visits: [unfilled] [FreeTextEntry1] : 2-3 week sessions/.

## 2024-05-03 DIAGNOSIS — F41.9 ANXIETY DISORDER, UNSPECIFIED: ICD-10-CM

## 2024-05-08 ENCOUNTER — OUTPATIENT (OUTPATIENT)
Dept: OUTPATIENT SERVICES | Facility: HOSPITAL | Age: 54
LOS: 1 days | End: 2024-05-08
Payer: COMMERCIAL

## 2024-05-08 ENCOUNTER — APPOINTMENT (OUTPATIENT)
Dept: GASTROENTEROLOGY | Facility: CLINIC | Age: 54
End: 2024-05-08
Payer: MEDICAID

## 2024-05-08 ENCOUNTER — OUTPATIENT (OUTPATIENT)
Dept: OUTPATIENT SERVICES | Facility: HOSPITAL | Age: 54
LOS: 1 days | End: 2024-05-08
Payer: MEDICAID

## 2024-05-08 VITALS
TEMPERATURE: 97.1 F | BODY MASS INDEX: 25.58 KG/M2 | WEIGHT: 163 LBS | HEIGHT: 67 IN | OXYGEN SATURATION: 96 % | HEART RATE: 61 BPM

## 2024-05-08 DIAGNOSIS — K31.A0 GASTRIC INTESTINAL METAPLASIA, UNSPECIFIED: ICD-10-CM

## 2024-05-08 DIAGNOSIS — K83.8 OTHER SPECIFIED DISEASES OF BILIARY TRACT: ICD-10-CM

## 2024-05-08 DIAGNOSIS — D13.1 BENIGN NEOPLASM OF STOMACH: ICD-10-CM

## 2024-05-08 DIAGNOSIS — Z98.890 OTHER SPECIFIED POSTPROCEDURAL STATES: Chronic | ICD-10-CM

## 2024-05-08 DIAGNOSIS — R74.8 ABNORMAL LEVELS OF OTHER SERUM ENZYMES: ICD-10-CM

## 2024-05-08 DIAGNOSIS — K21.9 GASTRO-ESOPHAGEAL REFLUX DISEASE W/OUT ESOPHAGITIS: ICD-10-CM

## 2024-05-08 DIAGNOSIS — Z00.00 ENCOUNTER FOR GENERAL ADULT MEDICAL EXAMINATION WITHOUT ABNORMAL FINDINGS: ICD-10-CM

## 2024-05-08 LAB
25(OH)D3 SERPL-MCNC: 29 NG/ML
ALBUMIN SERPL ELPH-MCNC: 4.5 G/DL
ALP BLD-CCNC: 134 U/L
ALT SERPL-CCNC: 20 U/L
ANION GAP SERPL CALC-SCNC: 15 MMOL/L
AST SERPL-CCNC: 13 U/L
BILIRUB SERPL-MCNC: 0.2 MG/DL
BUN SERPL-MCNC: 9 MG/DL
CALCIUM SERPL-MCNC: 9.7 MG/DL
CHLORIDE SERPL-SCNC: 104 MMOL/L
CHOLEST SERPL-MCNC: 172 MG/DL
CO2 SERPL-SCNC: 24 MMOL/L
CREAT SERPL-MCNC: 0.6 MG/DL
EGFR: 106 ML/MIN/1.73M2
ESTIMATED AVERAGE GLUCOSE: 131 MG/DL
GGT SERPL-CCNC: 41 U/L
GLUCOSE SERPL-MCNC: 114 MG/DL
HBA1C MFR BLD HPLC: 6.2 %
HCT VFR BLD CALC: 38.5 %
HDLC SERPL-MCNC: 51 MG/DL
HGB BLD-MCNC: 12.6 G/DL
LDLC SERPL CALC-MCNC: 99 MG/DL
MCHC RBC-ENTMCNC: 29.4 PG
MCHC RBC-ENTMCNC: 32.7 G/DL
MCV RBC AUTO: 90 FL
NONHDLC SERPL-MCNC: 121 MG/DL
PLATELET # BLD AUTO: 208 K/UL
PMV BLD AUTO: 0 /100 WBCS
PMV BLD: 12.8 FL
POTASSIUM SERPL-SCNC: 4.8 MMOL/L
PROT SERPL-MCNC: 7.2 G/DL
RBC # BLD: 4.28 M/UL
RBC # FLD: 12.7 %
SODIUM SERPL-SCNC: 143 MMOL/L
TRIGL SERPL-MCNC: 111 MG/DL
TSH SERPL-ACNC: 1.09 UIU/ML
WBC # FLD AUTO: 5.31 K/UL

## 2024-05-08 PROCEDURE — 84080 ASSAY ALKALINE PHOSPHATASES: CPT

## 2024-05-08 PROCEDURE — 83036 HEMOGLOBIN GLYCOSYLATED A1C: CPT

## 2024-05-08 PROCEDURE — 82977 ASSAY OF GGT: CPT

## 2024-05-08 PROCEDURE — 99214 OFFICE O/P EST MOD 30 MIN: CPT

## 2024-05-08 PROCEDURE — 80061 LIPID PANEL: CPT

## 2024-05-08 PROCEDURE — 85027 COMPLETE CBC AUTOMATED: CPT

## 2024-05-08 PROCEDURE — 84443 ASSAY THYROID STIM HORMONE: CPT

## 2024-05-08 PROCEDURE — 82306 VITAMIN D 25 HYDROXY: CPT

## 2024-05-08 PROCEDURE — 85610 PROTHROMBIN TIME: CPT

## 2024-05-08 PROCEDURE — 80053 COMPREHEN METABOLIC PANEL: CPT

## 2024-05-08 NOTE — ASSESSMENT
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 1cm TA w/ hot snare polypectomy in incisura w/ intestinal metaplasia. EGD (1/22): chronic gastritis w/ foci of IM. Neg HP   abdominal pain is infrequent, epigastric in nature, unrelated to food intake. States overall controlled with PPI  Rec: - c/w protonix 40mg qd - will schedule for EGD for yearly surveillance for gastric adenoma and IM mapping   #CRC screening Colonoscopy in 3/2020 Impressions:   Mild diverticulosis of the ascending colon.   Internal and external hemorrhoids.   Normal mucosa in the terminal ileum.   A non bleeding small AVM was noted in the transverse colon, no intervention done.   Polyp (7 mm) in the cecum. (Polypectomy).   Polyp (5 mm) in the cecum. (Biopsy).   Aphtous ulcers were noted on one fold in the proximal ascending colon, biopsies taken.  Path showing two tubular adenomas. -Repeat colonoscopy due in 2025    #elevated ALP  #CBD dilation  #HX cholecystectomy  -   RUQ show CBD 10 mm, increased from prior 2021, 6 mm - Pending ALP isoenzyme and GGT  Plan Will plan for EUS with EGD  Fu GGT and ALP isoenzyme

## 2024-05-08 NOTE — END OF VISIT
[] : Resident [FreeTextEntry3] : 55yo female presents for follow up s/p prior EGD with gastric tubular adenoma and intestinal metaplasia. Pt with intermittent epigastric pain, alk phos elevation and mild biliary dilation on imaging. Follow up repeat labs. Recommend EGD for mapping biopsies and EUS. Discussed risks/benefits.

## 2024-05-08 NOTE — PHYSICAL EXAM
[Alert] : alert [Normal Voice/Communication] : normal voice/communication [Healthy Appearing] : healthy appearing [No Acute Distress] : no acute distress [Sclera] : the sclera and conjunctiva were normal [Hearing Threshold Finger Rub Not [place]] : hearing was normal [Normal Lips/Gums] : the lips and gums were normal [Oropharynx] : the oropharynx was normal [Normal Appearance] : the appearance of the neck was normal [No Neck Mass] : no neck mass was observed [No Respiratory Distress] : no respiratory distress [No Acc Muscle Use] : no accessory muscle use [Respiration, Rhythm And Depth] : normal respiratory rhythm and effort [Auscultation Breath Sounds / Voice Sounds] : lungs were clear to auscultation bilaterally [Heart Rate And Rhythm] : heart rate was normal and rhythm regular [Normal S1, S2] : normal S1 and S2 [Murmurs] : no murmurs [Bowel Sounds] : normal bowel sounds [Abdomen Tenderness] : non-tender [No Masses] : no abdominal mass palpated [Abdomen Soft] : soft [] : no hepatosplenomegaly [Oriented To Time, Place, And Person] : oriented to person, place, and time

## 2024-05-08 NOTE — HISTORY OF PRESENT ILLNESS
[FreeTextEntry1] : 55 y/o Fm w/ PMhx of GERD on protonix 40 big, gastric adenoma, H. Pylori gastritis s/p treatment Asthma, multiple spinal surgeries presents for surveillance of gastric adenoma.   Pt had EGD done in 9/21 which showed H. Pylori gastritis s/p treatment with unconfirmed eradication, and 1cm TA w/ hot snare polypectomy in incisura. Went for repeat EGD in 2022, showed non specific chronic gastritis w/ foci of IM. Neg for HP. US reviewed.

## 2024-05-10 DIAGNOSIS — K83.8 OTHER SPECIFIED DISEASES OF BILIARY TRACT: ICD-10-CM

## 2024-05-10 DIAGNOSIS — K21.9 GASTRO-ESOPHAGEAL REFLUX DISEASE WITHOUT ESOPHAGITIS: ICD-10-CM

## 2024-05-10 DIAGNOSIS — K31.A0 GASTRIC INTESTINAL METAPLASIA, UNSPECIFIED: ICD-10-CM

## 2024-05-10 DIAGNOSIS — D13.1 BENIGN NEOPLASM OF STOMACH: ICD-10-CM

## 2024-05-10 DIAGNOSIS — R74.8 ABNORMAL LEVELS OF OTHER SERUM ENZYMES: ICD-10-CM

## 2024-05-15 LAB
ALP BLD-CCNC: 132 IU/L
ALP BONE CFR SERPL: 52 %
ALP INTEST CFR SERPL: 5 %
ALP LIVER CFR SERPL: 43 %

## 2024-05-16 ENCOUNTER — TRANSCRIPTION ENCOUNTER (OUTPATIENT)
Age: 54
End: 2024-05-16

## 2024-05-16 ENCOUNTER — RESULT REVIEW (OUTPATIENT)
Age: 54
End: 2024-05-16

## 2024-05-16 ENCOUNTER — OUTPATIENT (OUTPATIENT)
Dept: OUTPATIENT SERVICES | Facility: HOSPITAL | Age: 54
LOS: 1 days | Discharge: HOME | End: 2024-05-16
Payer: MEDICAID

## 2024-05-16 VITALS
RESPIRATION RATE: 18 BRPM | DIASTOLIC BLOOD PRESSURE: 72 MMHG | OXYGEN SATURATION: 100 % | SYSTOLIC BLOOD PRESSURE: 148 MMHG | HEART RATE: 74 BPM

## 2024-05-16 VITALS
WEIGHT: 151.9 LBS | DIASTOLIC BLOOD PRESSURE: 74 MMHG | HEIGHT: 60 IN | TEMPERATURE: 97 F | HEART RATE: 66 BPM | SYSTOLIC BLOOD PRESSURE: 138 MMHG | RESPIRATION RATE: 18 BRPM

## 2024-05-16 DIAGNOSIS — K83.8 OTHER SPECIFIED DISEASES OF BILIARY TRACT: ICD-10-CM

## 2024-05-16 DIAGNOSIS — K31.A11 GASTRIC INTESTINAL METAPLASIA WITHOUT DYSPLASIA, INVOLVING THE ANTRUM: ICD-10-CM

## 2024-05-16 DIAGNOSIS — Z91.013 ALLERGY TO SEAFOOD: ICD-10-CM

## 2024-05-16 DIAGNOSIS — R10.9 UNSPECIFIED ABDOMINAL PAIN: ICD-10-CM

## 2024-05-16 DIAGNOSIS — M54.9 DORSALGIA, UNSPECIFIED: ICD-10-CM

## 2024-05-16 DIAGNOSIS — Z91.011 ALLERGY TO MILK PRODUCTS: ICD-10-CM

## 2024-05-16 DIAGNOSIS — F43.10 POST-TRAUMATIC STRESS DISORDER, UNSPECIFIED: ICD-10-CM

## 2024-05-16 DIAGNOSIS — Z98.890 OTHER SPECIFIED POSTPROCEDURAL STATES: Chronic | ICD-10-CM

## 2024-05-16 DIAGNOSIS — D13.1 BENIGN NEOPLASM OF STOMACH: ICD-10-CM

## 2024-05-16 DIAGNOSIS — F41.9 ANXIETY DISORDER, UNSPECIFIED: ICD-10-CM

## 2024-05-16 DIAGNOSIS — K44.9 DIAPHRAGMATIC HERNIA WITHOUT OBSTRUCTION OR GANGRENE: ICD-10-CM

## 2024-05-16 DIAGNOSIS — G89.29 OTHER CHRONIC PAIN: ICD-10-CM

## 2024-05-16 DIAGNOSIS — R74.8 ABNORMAL LEVELS OF OTHER SERUM ENZYMES: ICD-10-CM

## 2024-05-16 DIAGNOSIS — J45.909 UNSPECIFIED ASTHMA, UNCOMPLICATED: ICD-10-CM

## 2024-05-16 DIAGNOSIS — K29.50 UNSPECIFIED CHRONIC GASTRITIS WITHOUT BLEEDING: ICD-10-CM

## 2024-05-16 DIAGNOSIS — Z00.00 ENCOUNTER FOR GENERAL ADULT MEDICAL EXAMINATION WITHOUT ABNORMAL FINDINGS: ICD-10-CM

## 2024-05-16 PROCEDURE — 88312 SPECIAL STAINS GROUP 1: CPT

## 2024-05-16 PROCEDURE — 88305 TISSUE EXAM BY PATHOLOGIST: CPT | Mod: 26

## 2024-05-16 PROCEDURE — 88312 SPECIAL STAINS GROUP 1: CPT | Mod: 26

## 2024-05-16 PROCEDURE — 88305 TISSUE EXAM BY PATHOLOGIST: CPT

## 2024-05-16 PROCEDURE — 43237 ENDOSCOPIC US EXAM ESOPH: CPT

## 2024-05-16 PROCEDURE — 43239 EGD BIOPSY SINGLE/MULTIPLE: CPT | Mod: XU

## 2024-05-16 RX ORDER — OMEPRAZOLE 10 MG/1
1 CAPSULE, DELAYED RELEASE ORAL
Qty: 0 | Refills: 0 | DISCHARGE

## 2024-05-16 RX ORDER — TRAZODONE HCL 50 MG
1 TABLET ORAL
Qty: 0 | Refills: 0 | DISCHARGE

## 2024-05-16 RX ORDER — CHOLECALCIFEROL (VITAMIN D3) 125 MCG
1 CAPSULE ORAL
Qty: 0 | Refills: 0 | DISCHARGE

## 2024-05-16 RX ORDER — GABAPENTIN 400 MG/1
1 CAPSULE ORAL
Qty: 0 | Refills: 0 | DISCHARGE

## 2024-05-16 RX ORDER — OXYCODONE HYDROCHLORIDE 5 MG/1
1 TABLET ORAL
Refills: 0 | DISCHARGE

## 2024-05-16 RX ORDER — MONTELUKAST 4 MG/1
1 TABLET, CHEWABLE ORAL
Qty: 0 | Refills: 0 | DISCHARGE

## 2024-05-16 RX ORDER — QUETIAPINE FUMARATE 200 MG/1
1 TABLET, FILM COATED ORAL
Refills: 0 | DISCHARGE

## 2024-05-16 RX ORDER — DIPHENHYDRAMINE HCL 50 MG
1 CAPSULE ORAL
Qty: 0 | Refills: 0 | DISCHARGE

## 2024-05-16 RX ORDER — TIZANIDINE 4 MG/1
0 TABLET ORAL
Qty: 0 | Refills: 0 | DISCHARGE

## 2024-05-16 RX ORDER — ALBUTEROL 90 UG/1
2 AEROSOL, METERED ORAL
Qty: 0 | Refills: 0 | DISCHARGE

## 2024-05-16 NOTE — CHART NOTE - NSCHARTNOTEFT_GEN_A_CORE
PACU ANESTHESIA ADMISSION NOTE      Procedure:   Post op diagnosis:      ____  Intubated  TV:______       Rate: ______      FiO2: ______    __x__  Patent Airway    _x___  Full return of protective reflexes    ____  Full recovery from anesthesia / back to baseline     Vitals:   T:  97.2         R:   15               BP:  121/66                Sat: 98%                  P: 76      Mental Status:  __x__ Awake   ___x__ Alert   _____ Drowsy   _____ Sedated    Nausea/Vomiting:  _x___ NO  ______Yes,   See Post - Op Orders          Pain Scale (0-10):  ___0__    Treatment: ____ None    ____ See Post - Op/PCA Orders    Post - Operative Fluids:   ____ Oral   ____ See Post - Op Orders    Plan: Discharge:   _x___Home       _____Floor     _____Critical Care    _____  Other:_________________    Comments: Pt awake and alert. Vital signs stable. No acute distress. Ready for discharge home.

## 2024-05-16 NOTE — ASU PATIENT PROFILE, ADULT - HISTORY OF COVID-19 VACCINATION
nutrition, exercise, and medication adherence    Patient given educational materials on their chronic medical conditions    Discussed use, benefit, and side effects of prescribed medications. Barriersto medication compliance addressed. All patient questions answered. Patient voiced understanding. Medications reviewed and patient understands.   Questions answered
Vaccine status unknown

## 2024-05-16 NOTE — ASU PATIENT PROFILE, ADULT - VISION (WITH CORRECTIVE LENSES IF THE PATIENT USUALLY WEARS THEM):
Detail Level: Zone
Normal vision: sees adequately in most situations; can see medication labels, newsprint

## 2024-05-16 NOTE — ASU PREOP CHECKLIST - VIA
From: Jill Tse  To: Negro Black  Sent: 1/24/2024 1:25 PM CST  Subject: Neurologist Appointment Questions    Jessica Black,  I came into your office yesterday with my aunt Jill Tse. You recommended that she see a neurologist, but the [facility] location does NOT have any appointments available until April.    2 questions for you:    - can we go to the [facility] and see a neurologist? I assume they are all under the same umbrella?    [facility] is closer and I would prefer that if you are okay with that. If not,    - can you see if you can get her in sooner in [place]    Please advise,  Ernestina Mazariegos  
stretcher

## 2024-05-16 NOTE — ASU PATIENT PROFILE, ADULT - FALL HARM RISK - UNIVERSAL INTERVENTIONS
Bed in lowest position, wheels locked, appropriate side rails in place/Call bell, personal items and telephone in reach/Instruct patient to call for assistance before getting out of bed or chair/Non-slip footwear when patient is out of bed/[place] to call system/Physically safe environment - no spills, clutter or unnecessary equipment/Purposeful Proactive Rounding/Room/bathroom lighting operational, light cord in reach

## 2024-05-17 DIAGNOSIS — Z00.00 ENCOUNTER FOR GENERAL ADULT MEDICAL EXAMINATION WITHOUT ABNORMAL FINDINGS: ICD-10-CM

## 2024-05-17 DIAGNOSIS — R74.8 ABNORMAL LEVELS OF OTHER SERUM ENZYMES: ICD-10-CM

## 2024-05-23 ENCOUNTER — APPOINTMENT (OUTPATIENT)
Dept: PSYCHIATRY | Facility: CLINIC | Age: 54
End: 2024-05-23

## 2024-05-23 LAB
INR PPP: 0.99 RATIO
PT BLD: 11.3 SEC
SURGICAL PATHOLOGY STUDY: SIGNIFICANT CHANGE UP

## 2024-05-28 ENCOUNTER — OUTPATIENT (OUTPATIENT)
Dept: OUTPATIENT SERVICES | Facility: HOSPITAL | Age: 54
LOS: 1 days | End: 2024-05-28
Payer: MEDICAID

## 2024-05-28 ENCOUNTER — APPOINTMENT (OUTPATIENT)
Dept: PSYCHIATRY | Facility: CLINIC | Age: 54
End: 2024-05-28
Payer: MEDICAID

## 2024-05-28 DIAGNOSIS — F33.42 MAJOR DEPRESSIVE DISORDER, RECURRENT, IN FULL REMISSION: ICD-10-CM

## 2024-05-28 DIAGNOSIS — Z98.890 OTHER SPECIFIED POSTPROCEDURAL STATES: Chronic | ICD-10-CM

## 2024-05-28 DIAGNOSIS — F41.1 GENERALIZED ANXIETY DISORDER: ICD-10-CM

## 2024-05-28 PROCEDURE — ZZZZZ: CPT

## 2024-05-28 PROCEDURE — 99213 OFFICE O/P EST LOW 20 MIN: CPT | Mod: 95

## 2024-05-28 RX ORDER — GABAPENTIN 800 MG/1
800 TABLET, COATED ORAL 3 TIMES DAILY
Qty: 90 | Refills: 2 | Status: ACTIVE | COMMUNITY
Start: 2019-02-15 | End: 1900-01-01

## 2024-05-28 RX ORDER — PAROXETINE HYDROCHLORIDE 30 MG/1
30 TABLET, FILM COATED ORAL
Qty: 30 | Refills: 2 | Status: ACTIVE | COMMUNITY
Start: 2023-09-26 | End: 1900-01-01

## 2024-05-28 NOTE — HISTORY OF PRESENT ILLNESS
[FreeTextEntry1] : Marta is a 54-year-old Turk[place] female,  with 5 children, unemployed, living with 2 of her adult children in [place], [place].  Marta has PPHx of MDD, KODY and PTSD and is currently on Paroxetine 30 mg daily, Gabapentin 800 mg TID, and Quetiapine 25 mg QHS.  She also has PMHx of chronic back, neck, and shoulder pain (s/p surgery x3, managed by a specialist), as well as asthma.  She takes Percocet 5-325 mg BID and uses medical cannabis for pain management.  Her son, Roni, functions as a HHA through [facility] and provides her assistance with all ADLs/IADLs.  Marta presents today for follow-up appointment.    Today, Kirsty presents pleasant and informs she had a marissa Memorial Day Weekend spending time with her family at her daughter's house.  She informs that her baseline mood has been good in nature, and she is taking her medications as prescribed without the presence of any adverse effects.  She informs that sleep is good with Seroquel 25 mg QHS and she continues to have follow-up with pain management for her orthopedic issues.  She denies the presence of any depressive symptoms and informs that her anxiety is well controlled with Paxil and Gabapentin.  She has no acute psychiatric complaints at this time.

## 2024-05-28 NOTE — REASON FOR VISIT
[Patient preference] : as per patient preference [Continuing, patient seen in-person within last 12 months] : Telehealth services are continuing as patient has been seen in-person within last 12 months. [Telephone (audio) - Individual/Group] : This telephonic visit was provided via audio only technology. [Other:___] : MATTHEW [Medical Office: ([facility])___] : The provider was located at the medical office in [unfilled]. [Home] : The patient, [unfilled], was located at home, [unfilled], at the time of the visit. [Verbal consent obtained from patient/other participant(s)] : Verbal consent for telehealth/telephonic services obtained from patient/other participant(s) [FreeTextEntry4] : 4267 [FreeTextEntry5] : 1200 [FreeTextEntry2] : 8/4/23 [Patient] : Patient [FreeTextEntry1] : Follow-up CC: "Everything is going good"  The patient presented for OPD follow-up via telehealth.

## 2024-05-28 NOTE — PLAN
[No] : No [Medication education provided] : Medication education provided. [Rationale for medication choices, possible risks/precautions, benefits, alternative treatment choices, and consequences of non-treatment discussed] : Rationale for medication choices, possible risks/precautions, benefits, alternative treatment choices, and consequences of non-treatment discussed with patient/family/caregiver  [FreeTextEntry5] : RTC 1 Month Continue regular therapy with Ardita Continue f/u with pain management  -c/w Paroxetine 30 mg daily -c/w Gabapentin 800 mg TID -c/w Quetiapine 25 mg QHS  Medication side effect profile was reviewed and discussed.  Patient was informed of the increased risk of dizziness and falls when combining Gabapentin with opioid analgesic medications.  She was also advised that Gabapentin can cause dose-dependent CNS depression and/or drowsiness when combined with opioid analgesics.  Fatal respiratory depression may also occur with concomitant opioid use.  Patient is aware and acknowledges the risk of the aforementioned.

## 2024-05-29 DIAGNOSIS — F33.42 MAJOR DEPRESSIVE DISORDER, RECURRENT, IN FULL REMISSION: ICD-10-CM

## 2024-05-29 DIAGNOSIS — F41.1 GENERALIZED ANXIETY DISORDER: ICD-10-CM

## 2024-05-30 ENCOUNTER — OUTPATIENT (OUTPATIENT)
Dept: OUTPATIENT SERVICES | Facility: HOSPITAL | Age: 54
LOS: 1 days | End: 2024-05-30
Payer: MEDICAID

## 2024-05-30 ENCOUNTER — APPOINTMENT (OUTPATIENT)
Dept: INTERNAL MEDICINE | Facility: CLINIC | Age: 54
End: 2024-05-30
Payer: MEDICAID

## 2024-05-30 VITALS
SYSTOLIC BLOOD PRESSURE: 138 MMHG | HEIGHT: 67 IN | HEART RATE: 77 BPM | TEMPERATURE: 98 F | OXYGEN SATURATION: 99 % | DIASTOLIC BLOOD PRESSURE: 88 MMHG | BODY MASS INDEX: 25.32 KG/M2 | WEIGHT: 161.31 LBS

## 2024-05-30 DIAGNOSIS — Z00.00 ENCOUNTER FOR GENERAL ADULT MEDICAL EXAMINATION WITHOUT ABNORMAL FINDINGS: ICD-10-CM

## 2024-05-30 DIAGNOSIS — R30.0 DYSURIA: ICD-10-CM

## 2024-05-30 DIAGNOSIS — N76.2 ACUTE VULVITIS: ICD-10-CM

## 2024-05-30 DIAGNOSIS — Z98.890 OTHER SPECIFIED POSTPROCEDURAL STATES: Chronic | ICD-10-CM

## 2024-05-30 DIAGNOSIS — M54.9 DORSALGIA, UNSPECIFIED: ICD-10-CM

## 2024-05-30 DIAGNOSIS — G89.29 DORSALGIA, UNSPECIFIED: ICD-10-CM

## 2024-05-30 PROCEDURE — G2211 COMPLEX E/M VISIT ADD ON: CPT | Mod: NC,1L

## 2024-05-30 PROCEDURE — 99214 OFFICE O/P EST MOD 30 MIN: CPT | Mod: GC

## 2024-05-30 PROCEDURE — 99214 OFFICE O/P EST MOD 30 MIN: CPT

## 2024-05-30 RX ORDER — MONTELUKAST 10 MG/1
10 TABLET, FILM COATED ORAL
Qty: 30 | Refills: 6 | Status: ACTIVE | COMMUNITY
Start: 2017-08-22 | End: 1900-01-01

## 2024-05-30 RX ORDER — DICLOFENAC SODIUM 1% 10 MG/G
1 GEL TOPICAL DAILY
Qty: 1 | Refills: 5 | Status: ACTIVE | COMMUNITY
Start: 2020-11-17 | End: 1900-01-01

## 2024-05-30 RX ORDER — ALBUTEROL SULFATE 90 UG/1
108 (90 BASE) INHALANT RESPIRATORY (INHALATION)
Qty: 1 | Refills: 7 | Status: ACTIVE | COMMUNITY
Start: 2017-08-22 | End: 1900-01-01

## 2024-05-30 RX ORDER — FLUTICASONE PROPIONATE 50 UG/1
50 SPRAY, METERED NASAL TWICE DAILY
Qty: 1 | Refills: 6 | Status: ACTIVE | COMMUNITY
Start: 2017-12-22 | End: 1900-01-01

## 2024-05-30 RX ORDER — TIZANIDINE HYDROCHLORIDE 4 MG/1
4 CAPSULE ORAL
Qty: 60 | Refills: 6 | Status: ACTIVE | COMMUNITY
Start: 2017-07-14 | End: 1900-01-01

## 2024-05-30 RX ORDER — FLUTICASONE PROPIONATE 0.5 MG/G
0.05 CREAM TOPICAL TWICE DAILY
Qty: 1 | Refills: 3 | Status: ACTIVE | COMMUNITY
Start: 2024-05-30 | End: 1900-01-01

## 2024-05-30 NOTE — HISTORY OF PRESENT ILLNESS
[FreeTextEntry1] : Follow up [de-identified] : Patient is a 54-year-old female patient with MDD, KODY, PTSD, pre-DM, GERD, asthma, and History of fall in 2010 s/p spinal surgeries and lumbar fusion in 2018 and sp spinal stimulator presenting for follow up. Patient was last seen in the clinic on Mar 2024, where patient had complaint of dysuria, which comes and goes for the past year. Recommendations were made for patient to get repeat UCx and follow up in gyn clinic. Patient was also complaining of back pain for which patient is following neurosurgery and pain management for.   Today, patient presents for follow up. Patient states still experiencing burning sensation when she pees. Also requesting medication refills.

## 2024-05-30 NOTE — ASSESSMENT
[FreeTextEntry1] : Patient is a 54-year-old female patient with MDD, KODY, PTSD, pre-DM, GERD, asthma, and History of fall in 2010 s/p spinal surgeries and lumbar fusion in 2018 and sp spinal stimulator presenting for follow up.  #Elevated BP - Last visit, BP 140s/70s, repeat low 150s - Repeat BP this visit: 138/88 - Patient in pain right now - No red flag symptoms  #Chronic back pain #Spinal surgeries with spinal stimulator #RIght adhesive capsulitis - Failed multiple attempts to resolve pain, now with chronic Sx - Following with neurosurgery and pain management - Status post multiple steroid injections - C/w prescribed pain meds- Percocet and medical cannabis as per pain medicine  #Dysuria- persistent- on and off- likely 2/2 Menopause #Possible Vulvitis - Empirically treat w/macrobid 100 twice per day for 3 day course in prior visit - Urine Cx showing strep B in urine (but was taken before getting the Abx as per patient) - Seems now more like itching and pain rather than lashanda dysuria - s/p Difulcan, no improvement - repeat Ua -ve, UCx on 3/2024 -ve - Vaginitis panel +ve for ureaplasma spp ANA - prescribed fluticasone cream  #Isolated ALP elevation (138)- chronic - T.Hermann wnl - No RUQ sono in OP charts - Check RUQ sono, GGT and ALP isoenzyme  #GERD - c/w ppi  #Pre DM #HLD - 3/2024 a1c 6.1 --> 6.2 (5/2024) - Lipid profile with LDL 86 --> 99 (5/2024) - Diet and lifestyle modifications - nutrition consult  #Asthma - Continue Albuterol and fluticasone and montelukast - Keep off smoking  #Vitamin D insufficiency - Level 29 (5/2024) -c/w Vitamin D supplementation  #Left upper quadrant mole - Derm following  #HO MDD #HO KODY #HO PTSD -Follows Psych -c/w Paroxetine, Gabapentin and Seroquel as per psych  #HO Gastric adenoma -Need yearly surveillance - last saw GI 5/2024  #HCM: - Colonoscopy will be done in 2026 - Mammogram oct 2023 (Birads 2), next in oct 2024 - Pap done in 2024 - Flu shot - RTC in 2-3 months or PRN

## 2024-05-30 NOTE — PHYSICAL EXAM
[No Acute Distress] : no acute distress [Well Nourished] : well nourished [Normal Sclera/Conjunctiva] : normal sclera/conjunctiva [PERRL] : pupils equal round and reactive to light [Normal Outer Ear/Nose] : the outer ears and nose were normal in appearance [Normal Oropharynx] : the oropharynx was normal [Supple] : supple [No Respiratory Distress] : no respiratory distress  [No Accessory Muscle Use] : no accessory muscle use [Normal Rate] : normal rate  [Regular Rhythm] : with a regular rhythm [Normal S1, S2] : normal S1 and S2 [Soft] : abdomen soft [Non Tender] : non-tender [Non-distended] : non-distended

## 2024-06-03 ENCOUNTER — APPOINTMENT (OUTPATIENT)
Dept: PSYCHIATRY | Facility: CLINIC | Age: 54
End: 2024-06-03

## 2024-06-04 ENCOUNTER — OUTPATIENT (OUTPATIENT)
Dept: OUTPATIENT SERVICES | Facility: HOSPITAL | Age: 54
LOS: 1 days | End: 2024-06-04
Payer: MEDICAID

## 2024-06-04 ENCOUNTER — APPOINTMENT (OUTPATIENT)
Dept: PSYCHIATRY | Facility: CLINIC | Age: 54
End: 2024-06-04

## 2024-06-04 DIAGNOSIS — F33.1 MAJOR DEPRESSIVE DISORDER, RECURRENT, MODERATE: ICD-10-CM

## 2024-06-04 DIAGNOSIS — Z98.890 OTHER SPECIFIED POSTPROCEDURAL STATES: Chronic | ICD-10-CM

## 2024-06-04 DIAGNOSIS — F41.9 ANXIETY DISORDER, UNSPECIFIED: ICD-10-CM

## 2024-06-04 PROCEDURE — 90832 PSYTX W PT 30 MINUTES: CPT

## 2024-06-04 NOTE — REASON FOR VISIT
[Patient preference] : as per patient preference [Telehealth (audio & video) - Individual/Group] : This visit was provided via telehealth using real-time 2-way audio visual technology. [Medical Office: ([facility])___] : The provider was located at the medical office in [unfilled]. [Home] : The patient, [unfilled], was located at home, [unfilled], at the time of the visit. [Verbal consent obtained from patient/other participant(s)] : Verbal consent for telehealth/telephonic services obtained from patient/other participant(s) [FreeTextEntry4] : 9:30am [FreeTextEntry5] : 10:00am [FreeTextEntry2] : 6/6/23 [Patient] : Patient [FreeTextEntry1] : Anxiety/Depression/ therapy session

## 2024-06-04 NOTE — PLAN
[FreeTextEntry2] : \par  Goal #1: Manage physical healthcare conditions and cope with related stress \par  Goal #1 Objective #1: Take medications/treatments as prescribed on a daily basis \par  Goal #1 Objective #2: Maintain good overall physical health and healthcare practices such as doing light exercises from chair every morning, and going out using her walker 2-3 times a week   [Cognitive and/or Behavior Therapy] : Cognitive and/or Behavior Therapy  [Motivational Interviewing] : Motivational Interviewing  [Supportive Therapy] : Supportive Therapy [de-identified] : Patient was seen via telehealth this morning. Patient is doing well, she was at home in her living room resting.   Patient reports that her anxiety has been on and off due to her physical pain and complications. Patient has limited physical movements due to her back pain, multiple back surgeries, and is on pain management medication. Therefore, she cannot be moving so much around, although she tries her best to walk around the house. Kirsty reports that her mood has been up and down. She says she has incontinence and must use daily pads.  her insurance covers them, except that they have been providing her with the wrong one. She needs the ones with wings. The insurance only covers the ones without the wings. She states that it's been stressful because she still has been paying out of pocket to get the correct ones.    She also expresses frustration with the [employer] sending her mail and packages to the post office, which is far away from her house.  her son has to go out of his way to take transportation and get to the postal office because he does not drive. Patient states that she has been in contact with her insurance worker trying to figure this out.    During the session, supportive therapy was provided, motivational interviewing as well. Patient will continue to take the medications as prescribed and maintain physical health, such as doing light exercises in the house, like using a chair, or doing yoga, using the YouTube channel, going for short walks whenever she has a chance and the availability of her children. Patient will continue to work on her arts and crafts, which she reports she enjoys and finds it helps with her anxiety. Patient denies any suicidal, homicidal ideations or any self-harming behaviors.  [Recommended Frequency of Visits: ____] : Recommended frequency of visits: [unfilled] [FreeTextEntry1] : 2-3 week sessions/.

## 2024-06-05 ENCOUNTER — OUTPATIENT (OUTPATIENT)
Dept: OUTPATIENT SERVICES | Facility: HOSPITAL | Age: 54
LOS: 1 days | End: 2024-06-05
Payer: COMMERCIAL

## 2024-06-05 ENCOUNTER — APPOINTMENT (OUTPATIENT)
Dept: NUTRITION | Facility: CLINIC | Age: 54
End: 2024-06-05

## 2024-06-05 DIAGNOSIS — Z98.890 OTHER SPECIFIED POSTPROCEDURAL STATES: Chronic | ICD-10-CM

## 2024-06-05 DIAGNOSIS — F41.9 ANXIETY DISORDER, UNSPECIFIED: ICD-10-CM

## 2024-06-05 DIAGNOSIS — Z00.00 ENCOUNTER FOR GENERAL ADULT MEDICAL EXAMINATION WITHOUT ABNORMAL FINDINGS: ICD-10-CM

## 2024-06-05 PROCEDURE — 97802 MEDICAL NUTRITION INDIV IN: CPT

## 2024-06-06 DIAGNOSIS — R73.03 PREDIABETES: ICD-10-CM

## 2024-06-17 ENCOUNTER — OUTPATIENT (OUTPATIENT)
Dept: OUTPATIENT SERVICES | Facility: HOSPITAL | Age: 54
LOS: 1 days | End: 2024-06-17
Payer: COMMERCIAL

## 2024-06-17 ENCOUNTER — APPOINTMENT (OUTPATIENT)
Dept: PSYCHIATRY | Facility: CLINIC | Age: 54
End: 2024-06-17

## 2024-06-17 DIAGNOSIS — F41.9 ANXIETY DISORDER, UNSPECIFIED: ICD-10-CM

## 2024-06-17 DIAGNOSIS — Z98.890 OTHER SPECIFIED POSTPROCEDURAL STATES: Chronic | ICD-10-CM

## 2024-06-17 PROCEDURE — 90832 PSYTX W PT 30 MINUTES: CPT

## 2024-06-17 NOTE — REASON FOR VISIT
[Patient preference] : as per patient preference [Telehealth (audio & video) - Individual/Group] : This visit was provided via telehealth using real-time 2-way audio visual technology. [Medical Office: ([facility])___] : The provider was located at the medical office in [unfilled]. [Home] : The patient, [unfilled], was located at home, [unfilled], at the time of the visit. [Verbal consent obtained from patient/other participant(s)] : Verbal consent for telehealth/telephonic services obtained from patient/other participant(s) [Patient] : Patient [FreeTextEntry4] : 9:30am [FreeTextEntry5] : 10:00am [FreeTextEntry2] : 6/6/23 [FreeTextEntry1] : Anxiety/Depression/ therapy session

## 2024-06-17 NOTE — PLAN
[Cognitive and/or Behavior Therapy] : Cognitive and/or Behavior Therapy  [Motivational Interviewing] : Motivational Interviewing  [Supportive Therapy] : Supportive Therapy [Recommended Frequency of Visits: ____] : Recommended frequency of visits: [unfilled] [FreeTextEntry2] :  Physical Health.    Goal #1  (In patient's words): "i want to be better, i don't want to depend on other people".    Objective A: Take medications/treatments as prescribed on a daily basis (x1)    Status of Objective: Continued - Progress made: Patient continues to adhere to medication management.    Objective B: Maintain good overall physical health and healthcare practices such as doing light exercises from chair every morning, and going out using her walker 2-3 times a week     Mental Health.  Goal #2-(In patient's words): "I want to have less depression and anxiety so i can enjoy my family".  Objective A: Reduce feeling anxious by using coping skills learned in therapy such as deep breathing, positive self talk and lower GAD7 by 3 points until next treatment plan.  Objective B: Reduce feeling of depression and crying spells at least 2 times a week. Reduce PHQ9 scoring by 3 points until next treatment plan review. Current score 14.  [de-identified] : Patient was seen via telehealth this morning. Patient is doing well, she was at home in her living room resting. She states that her home is very hot, especially in the afternoon when the sun is stronger. She tries to stay in one corner of her living room where the AC is working and it's cooler. The patient states that she is now pre diabetic. She has a lot of pinched nerves on her neck and back. Her pain management wanted to give her a steroid shot, but her primary care doctor was suggesting that she does not get steroid shots because she's pre diabetic. The patient also has high blood pressure, and she states she has gained a lot of weight over the last year. Patient recently saw a nutritionist.  She got a plan on what to eat. She said that she's been trying to follow it daily with the help of her son who is her home health aide.  Patients' treatment plan was due; we reviewed together. Patient scored 13 on the GAD7 for anxiety and she scored 14 for PHQ9 for depression. Treatment plan reviewed and completed.   During the session, supportive therapy was provided, motivational interviewing as well. Patient will continue to take the medications as prescribed and maintain physical health, such as doing light exercises in the house, like using a chair, or doing yoga, using the YouTube channel, going for short walks whenever she has a chance and the availability of her children. Patient will continue to work on her arts and crafts, which she reports she enjoys and finds it helps with her anxiety. Patient denies any suicidal, homicidal ideations or any self-harming behaviors.  [FreeTextEntry1] : 2-3 week sessions/.

## 2024-06-17 NOTE — PLAN
[Cognitive and/or Behavior Therapy] : Cognitive and/or Behavior Therapy  [Motivational Interviewing] : Motivational Interviewing  [Supportive Therapy] : Supportive Therapy [Recommended Frequency of Visits: ____] : Recommended frequency of visits: [unfilled] [FreeTextEntry2] :  Physical Health.    Goal #1  (In patient's words): "i want to be better, i don't want to depend on other people".    Objective A: Take medications/treatments as prescribed on a daily basis (x1)    Status of Objective: Continued - Progress made: Patient continues to adhere to medication management.    Objective B: Maintain good overall physical health and healthcare practices such as doing light exercises from chair every morning, and going out using her walker 2-3 times a week     Mental Health.  Goal #2-(In patient's words): "I want to have less depression and anxiety so i can enjoy my family".  Objective A: Reduce feeling anxious by using coping skills learned in therapy such as deep breathing, positive self talk and lower GAD7 by 3 points until next treatment plan.  Objective B: Reduce feeling of depression and crying spells at least 2 times a week. Reduce PHQ9 scoring by 3 points until next treatment plan review. Current score 14.  [de-identified] : Patient was seen via telehealth this morning. Patient is doing well, she was at home in her living room resting. She states that her home is very hot, especially in the afternoon when the sun is stronger. She tries to stay in one corner of her living room where the AC is working and it's cooler. The patient states that she is now pre diabetic. She has a lot of pinched nerves on her neck and back. Her pain management wanted to give her a steroid shot, but her primary care doctor was suggesting that she does not get steroid shots because she's pre diabetic. The patient also has high blood pressure, and she states she has gained a lot of weight over the last year. Patient recently saw a nutritionist.  She got a plan on what to eat. She said that she's been trying to follow it daily with the help of her son who is her home health aide.  Patients' treatment plan was due; we reviewed together. Patient scored 13 on the GAD7 for anxiety and she scored 14 for PHQ9 for depression. Treatment plan reviewed and completed.   During the session, supportive therapy was provided, motivational interviewing as well. Patient will continue to take the medications as prescribed and maintain physical health, such as doing light exercises in the house, like using a chair, or doing yoga, using the YouTube channel, going for short walks whenever she has a chance and the availability of her children. Patient will continue to work on her arts and crafts, which she reports she enjoys and finds it helps with her anxiety. Patient denies any suicidal, homicidal ideations or any self-harming behaviors.  [FreeTextEntry1] : 2-3 week sessions/.

## 2024-06-17 NOTE — REASON FOR VISIT
[Patient preference] : as per patient preference [Telehealth (audio & video) - Individual/Group] : This visit was provided via telehealth using real-time 2-way audio visual technology. [Medical Office: ([facility])___] : The provider was located at the medical office in [unfilled]. [Home] : The patient, [unfilled], was located at home, [unfilled], at the time of the visit. [Verbal consent obtained from patient/other participant(s)] : Verbal consent for telehealth/telephonic services obtained from patient/other participant(s) [Patient] : Patient [FreeTextEntry4] : 9:30am [FreeTextEntry5] : 10:00am [FreeTextEntry2] : 6/6/23 [FreeTextEntry1] : Anxiety/Depression/ therapy session

## 2024-06-17 NOTE — DISCUSSION/SUMMARY
[Plan Review] : Plan Review [Adherent to treatment recommendations] : adherent to treatment recommendations [Insightful] : insightful [Motivated to participate in treatment] : motivated to participate in treatment [Part of a supportive family] : part of a supportive family [Involved in cultural/spiritual/Confucianism/community activities] : involved in cultural/spiritual/Confucianism/community activities [English fluency] : English fluency [Connected to healthcare] : connected to healthcare [Access to safe outdoor spaces] : access to safe outdoor spaces [Physical Health] : Physical Health [Continued - Progress made] : Continued - Progress made: [Treatment is no longer medically necessary as evidenced by:] : Treatment is no longer medically necessary as evidenced by: [None - Reason others did not participate:] : None - Reason others did not participate:  [Yes] : Yes [Psychiatric Provider/Prescriber] : Psychiatric Provider/Prescriber [Therapist] : Therapist [Supervisor (if needed)] : Supervisor [Tobacco Screening Completed?] : Tobacco Screening Completed: Yes [Potential impact of patient’s physical health conditions on psychiatric care?] : Potential impact of patient's physical health conditions on psychiatric care: Yes [Mental Health] : Mental Health [Initial] : Initial [every ___ months] : every [unfilled] months [every ___ weeks] : every [unfilled] weeks [FreeTextEntry2] : 6/17/2025 [FreeTextEntry3] : 04/26/2023 [de-identified] : Dr Malave/Dr Braxton  [FreeTextEntry1] : Patient continues to have depression and anxiety due to her physical health limitations and pain. [FreeTextEntry4] : "I want to have less depression and anxiety so i can enjoy my family" [de-identified] : Patients goal for mental health is to feel less depression and anxiety and lower the scaling score by next treatment plan [de-identified] : Reduce feeling anxious by using coping skills learned in therapy such as deep breathing, positive self talk and lower GAD7 by 3 points until next treatment plan.  [de-identified] : 6/17/2025 [de-identified] : Current GAD7 score is 13. The goal is to lower it by few points until next treatment plan.  [de-identified] : Reduce feeling of depression and crying spells at least 2 times a week. Reduce PHQ9 scoring by 3 points until next treatment plan review. [de-identified] : 6/17/2025 [de-identified] : Patients current PHQ9 score is 14. The goal for next treatment plan is lower the score at least by 3 points.  [FreeTextEntry5] : CBT, Motivational interviewing, reflective listening, and supportive therapy.  [de-identified] : Dr Malave attending/ Dr Braxton resident- patient may get a new resident in July [de-identified] : Shelly Montiel LMSW [de-identified] : Treatment is no longer medically necessary as evidenced by: The treatment is no longer medically necessary when the patient no longer requires individual psychotherapy and/or medication to perform at baseline. [de-identified] : patient declined. [Does patient require any additional health services or referrals?] : Does patient require any additional health services or referrals: No

## 2024-06-17 NOTE — DISCUSSION/SUMMARY
[Plan Review] : Plan Review [Adherent to treatment recommendations] : adherent to treatment recommendations [Insightful] : insightful [Motivated to participate in treatment] : motivated to participate in treatment [Part of a supportive family] : part of a supportive family [Involved in cultural/spiritual/Christian/community activities] : involved in cultural/spiritual/Christian/community activities [English fluency] : English fluency [Connected to healthcare] : connected to healthcare [Access to safe outdoor spaces] : access to safe outdoor spaces [Physical Health] : Physical Health [Continued - Progress made] : Continued - Progress made: [Treatment is no longer medically necessary as evidenced by:] : Treatment is no longer medically necessary as evidenced by: [None - Reason others did not participate:] : None - Reason others did not participate:  [Yes] : Yes [Psychiatric Provider/Prescriber] : Psychiatric Provider/Prescriber [Therapist] : Therapist [Supervisor (if needed)] : Supervisor [Tobacco Screening Completed?] : Tobacco Screening Completed: Yes [Potential impact of patient’s physical health conditions on psychiatric care?] : Potential impact of patient's physical health conditions on psychiatric care: Yes [Mental Health] : Mental Health [Initial] : Initial [every ___ months] : every [unfilled] months [every ___ weeks] : every [unfilled] weeks [FreeTextEntry2] : 6/17/2025 [FreeTextEntry3] : 04/26/2023 [de-identified] : Dr Malave/Dr Braxton  [FreeTextEntry1] : Patient continues to have depression and anxiety due to her physical health limitations and pain. [FreeTextEntry4] : "I want to have less depression and anxiety so i can enjoy my family" [de-identified] : Patients goal for mental health is to feel less depression and anxiety and lower the scaling score by next treatment plan [de-identified] : Reduce feeling anxious by using coping skills learned in therapy such as deep breathing, positive self talk and lower GAD7 by 3 points until next treatment plan.  [de-identified] : 6/17/2025 [de-identified] : Current GAD7 score is 13. The goal is to lower it by few points until next treatment plan.  [de-identified] : Reduce feeling of depression and crying spells at least 2 times a week. Reduce PHQ9 scoring by 3 points until next treatment plan review. [de-identified] : 6/17/2025 [de-identified] : Patients current PHQ9 score is 14. The goal for next treatment plan is lower the score at least by 3 points.  [FreeTextEntry5] : CBT, Motivational interviewing, reflective listening, and supportive therapy.  [de-identified] : Dr Malave attending/ Dr Braxton resident- patient may get a new resident in July [de-identified] : Shelly Montiel LMSW [de-identified] : Treatment is no longer medically necessary as evidenced by: The treatment is no longer medically necessary when the patient no longer requires individual psychotherapy and/or medication to perform at baseline. [de-identified] : patient declined. [Does patient require any additional health services or referrals?] : Does patient require any additional health services or referrals: No

## 2024-06-18 ENCOUNTER — OUTPATIENT (OUTPATIENT)
Dept: OUTPATIENT SERVICES | Facility: HOSPITAL | Age: 54
LOS: 1 days | End: 2024-06-18
Payer: MEDICAID

## 2024-06-18 ENCOUNTER — APPOINTMENT (OUTPATIENT)
Dept: PSYCHIATRY | Facility: CLINIC | Age: 54
End: 2024-06-18
Payer: MEDICAID

## 2024-06-18 DIAGNOSIS — Z98.890 OTHER SPECIFIED POSTPROCEDURAL STATES: Chronic | ICD-10-CM

## 2024-06-18 DIAGNOSIS — F33.42 MAJOR DEPRESSIVE DISORDER, RECURRENT, IN FULL REMISSION: ICD-10-CM

## 2024-06-18 DIAGNOSIS — F43.10 POST-TRAUMATIC STRESS DISORDER, UNSPECIFIED: ICD-10-CM

## 2024-06-18 DIAGNOSIS — F41.1 GENERALIZED ANXIETY DISORDER: ICD-10-CM

## 2024-06-18 DIAGNOSIS — F41.9 ANXIETY DISORDER, UNSPECIFIED: ICD-10-CM

## 2024-06-18 PROCEDURE — 99213 OFFICE O/P EST LOW 20 MIN: CPT | Mod: 95

## 2024-06-18 PROCEDURE — ZZZZZ: CPT

## 2024-06-18 RX ORDER — QUETIAPINE FUMARATE 50 MG/1
50 TABLET ORAL
Qty: 30 | Refills: 2 | Status: ACTIVE | COMMUNITY
Start: 2024-01-16 | End: 1900-01-01

## 2024-06-18 NOTE — REASON FOR VISIT
[Patient preference] : as per patient preference [Continuing, patient seen in-person within last 12 months] : Telehealth services are continuing as patient has been seen in-person within last 12 months. [Telephone (audio) - Individual/Group] : This telephonic visit was provided via audio only technology. [Other:___] : MATTHEW [Medical Office: ([facility])___] : The provider was located at the medical office in [unfilled]. [Home] : The patient, [unfilled], was located at home, [unfilled], at the time of the visit. [Verbal consent obtained from patient/other participant(s)] : Verbal consent for telehealth/telephonic services obtained from patient/other participant(s) [FreeTextEntry4] : 1400 [[facility]] : 0972 [FreeTextEntry2] : 8/4/23 [Patient] : Patient [FreeTextEntry1] : Follow-up CC: "Everything has been fine, asides from the pain"  The patient presented for OPD follow-up via telehealth.

## 2024-06-18 NOTE — HISTORY OF PRESENT ILLNESS
[FreeTextEntry1] : Marta is a 54-year-old [place] female,  with 5 children, unemployed, living with 2 of her adult children in [place], [place].  Marta has PPHx of MDD, KODY and PTSD and is currently on Paroxetine 30 mg daily, Gabapentin 800 mg TID, and Quetiapine 50 mg QHS.  She also has PMHx of chronic back, neck, and shoulder pain (s/p surgery x3, managed by a specialist), as well as asthma.  She takes Percocet 5-325 mg BID PRN and uses medical cannabis drops for pain management.  Her son, Roni, functions as a HHA through [facility] and provides her assistance with all ADLs/IADLs.  Marta presents today for follow-up appointment.    Today on assessment, Kirsty presents pleasant and smiling.  She informs that her mood has been good in nature, despite being in a considerable amount of baseline pain.  She informs that her sleep has declined in quality since our last appointment and that Seroquel 25 mg at bedtime is no longer effective for sleep initiation.  She reports getting ~2 hours of sleep/night and is amenable to a dose increase to 50 mg QHS.  She continues to have follow-up appointments with pain management for her orthopedic issues and endorses taking Percocet twice daily on most days in conjunction with cannabis drops.  She was educated that cannabis drops before bedtime can negatively affect sleep quality and she demonstrates understanding.  She denies the presence of any depressive symptoms and informs that her anxiety is well controlled on current regimen.  Marta reports taking her medications as prescribed and denies the presence of any adverse effects

## 2024-06-18 NOTE — PLAN
[No] : No [Medication education provided] : Medication education provided. [Rationale for medication choices, possible risks/precautions, benefits, alternative treatment choices, and consequences of non-treatment discussed] : Rationale for medication choices, possible risks/precautions, benefits, alternative treatment choices, and consequences of non-treatment discussed with patient/family/caregiver  [FreeTextEntry5] : RTC in 3 Months or sooner for any emergent psychiatric issues Continue regular therapy with Ardita Continue f/u with pain management  -c/w Paroxetine 30 mg daily -c/w Gabapentin 800 mg TID -increase Quetiapine from 25 to 50 mg QHS in the setting of ongoing early insomnia  Medication side effect profile was reviewed and discussed.  Patient was informed of the increased risk of dizziness and falls when combining Gabapentin with opioid analgesic medications.  She was also advised that Gabapentin can cause dose-dependent CNS depression and/or drowsiness when combined with opioid analgesics.  Fatal respiratory depression may also occur with concomitant opioid use.  Patient is aware and acknowledges the risk of the aforementioned.

## 2024-06-19 DIAGNOSIS — F41.1 GENERALIZED ANXIETY DISORDER: ICD-10-CM

## 2024-06-19 DIAGNOSIS — F33.42 MAJOR DEPRESSIVE DISORDER, RECURRENT, IN FULL REMISSION: ICD-10-CM

## 2024-06-19 DIAGNOSIS — F43.10 POST-TRAUMATIC STRESS DISORDER, UNSPECIFIED: ICD-10-CM

## 2024-07-01 ENCOUNTER — APPOINTMENT (OUTPATIENT)
Dept: PSYCHIATRY | Facility: CLINIC | Age: 54
End: 2024-07-01

## 2024-07-01 ENCOUNTER — OUTPATIENT (OUTPATIENT)
Dept: OUTPATIENT SERVICES | Facility: HOSPITAL | Age: 54
LOS: 1 days | End: 2024-07-01
Payer: MEDICAID

## 2024-07-01 DIAGNOSIS — F41.9 ANXIETY DISORDER, UNSPECIFIED: ICD-10-CM

## 2024-07-01 DIAGNOSIS — Z98.890 OTHER SPECIFIED POSTPROCEDURAL STATES: Chronic | ICD-10-CM

## 2024-07-01 PROCEDURE — 90832 PSYTX W PT 30 MINUTES: CPT

## 2024-07-02 DIAGNOSIS — F41.9 ANXIETY DISORDER, UNSPECIFIED: ICD-10-CM

## 2024-07-10 ENCOUNTER — NON-APPOINTMENT (OUTPATIENT)
Age: 54
End: 2024-07-10

## 2024-07-11 ENCOUNTER — OUTPATIENT (OUTPATIENT)
Dept: OUTPATIENT SERVICES | Facility: HOSPITAL | Age: 54
LOS: 1 days | End: 2024-07-11
Payer: MEDICAID

## 2024-07-11 ENCOUNTER — APPOINTMENT (OUTPATIENT)
Dept: INTERNAL MEDICINE | Facility: CLINIC | Age: 54
End: 2024-07-11
Payer: MEDICAID

## 2024-07-11 VITALS
TEMPERATURE: 98 F | BODY MASS INDEX: 24.17 KG/M2 | HEART RATE: 67 BPM | HEIGHT: 67 IN | DIASTOLIC BLOOD PRESSURE: 84 MMHG | SYSTOLIC BLOOD PRESSURE: 138 MMHG | WEIGHT: 154 LBS | OXYGEN SATURATION: 98 %

## 2024-07-11 DIAGNOSIS — Z98.890 OTHER SPECIFIED POSTPROCEDURAL STATES: Chronic | ICD-10-CM

## 2024-07-11 DIAGNOSIS — A49.3 NONSPECIFIC URETHRITIS: ICD-10-CM

## 2024-07-11 DIAGNOSIS — R30.0 DYSURIA: ICD-10-CM

## 2024-07-11 DIAGNOSIS — N76.2 ACUTE VULVITIS: ICD-10-CM

## 2024-07-11 DIAGNOSIS — N34.1 NONSPECIFIC URETHRITIS: ICD-10-CM

## 2024-07-11 DIAGNOSIS — Z00.00 ENCOUNTER FOR GENERAL ADULT MEDICAL EXAMINATION WITHOUT ABNORMAL FINDINGS: ICD-10-CM

## 2024-07-11 PROCEDURE — 99214 OFFICE O/P EST MOD 30 MIN: CPT | Mod: GC

## 2024-07-11 PROCEDURE — G2211 COMPLEX E/M VISIT ADD ON: CPT | Mod: NC,1L

## 2024-07-11 PROCEDURE — 99214 OFFICE O/P EST MOD 30 MIN: CPT

## 2024-07-11 RX ORDER — TERCONAZOLE 4 MG/G
0.4 CREAM VAGINAL
Qty: 1 | Refills: 2 | Status: ACTIVE | COMMUNITY
Start: 2024-07-11 | End: 1900-01-01

## 2024-07-15 NOTE — ED ADULT NURSE NOTE - TEMPLATE LIST FOR HEAD TO TOE ASSESSMENT
LET PATIENT KNOW THAT IF DR FELT LIKE HER TOENAILS NEEDED TO BE DONE SHE WOULD TAKE CARE OF IT   Fall

## 2024-07-16 ENCOUNTER — APPOINTMENT (OUTPATIENT)
Dept: PSYCHIATRY | Facility: CLINIC | Age: 54
End: 2024-07-16

## 2024-07-16 ENCOUNTER — OUTPATIENT (OUTPATIENT)
Dept: OUTPATIENT SERVICES | Facility: HOSPITAL | Age: 54
LOS: 1 days | End: 2024-07-16
Payer: COMMERCIAL

## 2024-07-16 DIAGNOSIS — Z98.890 OTHER SPECIFIED POSTPROCEDURAL STATES: Chronic | ICD-10-CM

## 2024-07-16 DIAGNOSIS — F41.9 ANXIETY DISORDER, UNSPECIFIED: ICD-10-CM

## 2024-07-16 PROCEDURE — 90832 PSYTX W PT 30 MINUTES: CPT

## 2024-07-17 DIAGNOSIS — F41.9 ANXIETY DISORDER, UNSPECIFIED: ICD-10-CM

## 2024-07-18 DIAGNOSIS — R30.0 DYSURIA: ICD-10-CM

## 2024-07-18 DIAGNOSIS — N76.2 ACUTE VULVITIS: ICD-10-CM

## 2024-07-18 DIAGNOSIS — N34.1 NONSPECIFIC URETHRITIS: ICD-10-CM

## 2024-08-02 NOTE — REASON FOR VISIT
General Question     Subject: JOSEPHINE'S PHONE CALL  Patient and /or Facility Request: Ryan Magana \"Giacomo\"   Contact Number: [phone]     THE PT RETURNED JOSEPHINE EVANGELINA'S CALL AND WANTED TO LET HER KNOW THAT SHE CAN CALL HIM BACK ON MONDAY.     [Initial Evaluation] : an initial evaluation [Family Member] : family member [Number can be texted] : number can be texted [OK  to leave message] : OK  to leave message

## 2024-08-07 ENCOUNTER — APPOINTMENT (OUTPATIENT)
Dept: PSYCHIATRY | Facility: CLINIC | Age: 54
End: 2024-08-07

## 2024-08-20 ENCOUNTER — APPOINTMENT (OUTPATIENT)
Dept: PSYCHIATRY | Facility: CLINIC | Age: 54
End: 2024-08-20

## 2024-08-20 ENCOUNTER — OUTPATIENT (OUTPATIENT)
Dept: OUTPATIENT SERVICES | Facility: HOSPITAL | Age: 54
LOS: 1 days | End: 2024-08-20
Payer: COMMERCIAL

## 2024-08-20 DIAGNOSIS — Z98.890 OTHER SPECIFIED POSTPROCEDURAL STATES: Chronic | ICD-10-CM

## 2024-08-20 DIAGNOSIS — F41.9 ANXIETY DISORDER, UNSPECIFIED: ICD-10-CM

## 2024-08-20 DIAGNOSIS — F33.1 MAJOR DEPRESSIVE DISORDER, RECURRENT, MODERATE: ICD-10-CM

## 2024-08-20 PROCEDURE — 90832 PSYTX W PT 30 MINUTES: CPT

## 2024-08-20 NOTE — REASON FOR VISIT
[Patient preference] : as per patient preference [Telehealth (audio & video) - Individual/Group] : This visit was provided via telehealth using real-time 2-way audio visual technology. [Medical Office: ([facility])___] : The provider was located at the medical office in [unfilled]. [Home] : The patient, [unfilled], was located at home, [unfilled], at the time of the visit. [Verbal consent obtained from patient/other participant(s)] : Verbal consent for telehealth/telephonic services obtained from patient/other participant(s) [FreeTextEntry4] : 11am [FreeTextEntry5] : 11:17am [FreeTextEntry2] : 6/6/23 [Patient] : Patient [FreeTextEntry1] : Anxiety/Depression/ therapy session

## 2024-08-20 NOTE — PLAN
[FreeTextEntry2] : Physical Health.    Goal #1  (In patient's words): "i want to be better, i don't want to depend on other people".    Objective A: Take medications/treatments as prescribed on a daily basis (x1)    Status of Objective: Continued - Progress made: Patient continues to adhere to medication management.    Objective B: Maintain good overall physical health and healthcare practices such as doing light exercises from chair every morning, and going out using her walker 2-3 times a week     Mental Health.  Goal #2-(In patient's words): "I want to have less depression and anxiety so i can enjoy my family".  Objective A: Reduce feeling anxious by using coping skills learned in therapy such as deep breathing, positive self talk and lower GAD7 by 3 points until next treatment plan.  Objective B: Reduce feeling of depression and crying spells at least 2 times a week. Reduce PHQ9 scoring by 3 points until next treatment plan review. Current score 14.  [Cognitive and/or Behavior Therapy] : Cognitive and/or Behavior Therapy  [Motivational Interviewing] : Motivational Interviewing  [Supportive Therapy] : Supportive Therapy [de-identified] : Patient was seen via telehealth this morning. Patient is doing well; she was at home in her living room resting. The patient states that she is now pre diabetic. She has a lot of pinched nerves on her neck and back. The patient had just taken pain medication before joining the session, so she was tired, drowsy and unable to fully participate. She stated that she's in a lot of pain, family is doing well, and she didn't want to continue to talk about her past trauma she started to talk about in the last session, she states that she is trying not to think about it and put it behind.    Patient is working towards the treatment goals, she takes her medications as prescribed and visits all her doctors. Patient is learning to use coping skills to reduce anxiety and depression by talking during therapy and doing activites to help.     During the session, supportive therapy was provided, motivational interviewing as well. Patient will continue to take the medications as prescribed and maintain physical health, such as doing light exercises in the house, like using a chair, or doing yoga, using the YouTube channel, going for short walks whenever she has a chance and the availability of her children. Patient will continue to work on her arts and crafts, which she reports she enjoys and finds it helps with her anxiety. Patient denies any suicidal, homicidal ideations or any self-harming behaviors.       [Recommended Frequency of Visits: ____] : Recommended frequency of visits: [unfilled] [FreeTextEntry1] : 2-3 week sessions/.

## 2024-08-20 NOTE — END OF VISIT
[Individual Psychotherapy for 38-52 minutes] : Individual Psychotherapy for 38 - 52 minutes at home:

## 2024-08-20 NOTE — REASON FOR VISIT
[Patient preference] : as per patient preference [Telehealth (audio & video) - Individual/Group] : This visit was provided via telehealth using real-time 2-way audio visual technology. [Medical Office: ([facility])___] : The provider was located at the medical office in [unfilled]. [Home] : The patient, [unfilled], was located at home, [unfilled], at the time of the visit. [Verbal consent obtained from patient/other participant(s)] : Verbal consent for telehealth/telephonic services obtained from patient/other participant(s) [FreeTextEntry4] : 11am [FreeTextEntry5] : 11:17am [FreeTextEntry2] : 6/6/23 [Patient] : Patient [FreeTextEntry1] : Anxiety/Depression/ therapy session

## 2024-08-20 NOTE — PHYSICAL EXAM
[Cooperative] : cooperative [Anxious] : anxious [Full] : full [Clear] : clear [Linear/Goal Directed] : linear/goal directed [Memory] : memory [Average] : average [WNL] : within normal limits [Individual reports tobacco use during the last 30 days?] : Individual reports tobacco use during the last 30 days? No [Individual reports use of the following tobacco products during the last 30 days?] : Individual reports use of the following tobacco products during the last 30 days? No [Individual reports current use of tobacco cessation medication or nicotine replacement therapy?] : Individual reports current use of tobacco cessation medication or nicotine replacement therapy? No [Was tobacco cessation medication and/or nicotine replacement therapy recommended?] : Was tobacco cessation medication and/or nicotine replacement therapy recommended? No [Does individual accept referral to MD for cessation medication or NRT?] : Does individual accept referral to MD for cessation medication or NRT? No

## 2024-08-20 NOTE — PLAN
[FreeTextEntry2] : Physical Health.    Goal #1  (In patient's words): "i want to be better, i don't want to depend on other people".    Objective A: Take medications/treatments as prescribed on a daily basis (x1)    Status of Objective: Continued - Progress made: Patient continues to adhere to medication management.    Objective B: Maintain good overall physical health and healthcare practices such as doing light exercises from chair every morning, and going out using her walker 2-3 times a week     Mental Health.  Goal #2-(In patient's words): "I want to have less depression and anxiety so i can enjoy my family".  Objective A: Reduce feeling anxious by using coping skills learned in therapy such as deep breathing, positive self talk and lower GAD7 by 3 points until next treatment plan.  Objective B: Reduce feeling of depression and crying spells at least 2 times a week. Reduce PHQ9 scoring by 3 points until next treatment plan review. Current score 14.  [Cognitive and/or Behavior Therapy] : Cognitive and/or Behavior Therapy  [Motivational Interviewing] : Motivational Interviewing  [Supportive Therapy] : Supportive Therapy [de-identified] : Patient was seen via telehealth this morning. Patient is doing well; she was at home in her living room resting. The patient states that she is now pre diabetic. She has a lot of pinched nerves on her neck and back. The patient had just taken pain medication before joining the session, so she was tired, drowsy and unable to fully participate. She stated that she's in a lot of pain, family is doing well, and she didn't want to continue to talk about her past trauma she started to talk about in the last session, she states that she is trying not to think about it and put it behind.    Patient is working towards the treatment goals, she takes her medications as prescribed and visits all her doctors. Patient is learning to use coping skills to reduce anxiety and depression by talking during therapy and doing activites to help.     During the session, supportive therapy was provided, motivational interviewing as well. Patient will continue to take the medications as prescribed and maintain physical health, such as doing light exercises in the house, like using a chair, or doing yoga, using the YouTube channel, going for short walks whenever she has a chance and the availability of her children. Patient will continue to work on her arts and crafts, which she reports she enjoys and finds it helps with her anxiety. Patient denies any suicidal, homicidal ideations or any self-harming behaviors.       [Recommended Frequency of Visits: ____] : Recommended frequency of visits: [unfilled] [FreeTextEntry1] : 2-3 week sessions/.

## 2024-08-21 DIAGNOSIS — F41.9 ANXIETY DISORDER, UNSPECIFIED: ICD-10-CM

## 2024-09-03 ENCOUNTER — OUTPATIENT (OUTPATIENT)
Dept: OUTPATIENT SERVICES | Facility: HOSPITAL | Age: 54
LOS: 1 days | End: 2024-09-03
Payer: COMMERCIAL

## 2024-09-03 ENCOUNTER — APPOINTMENT (OUTPATIENT)
Dept: PSYCHIATRY | Facility: CLINIC | Age: 54
End: 2024-09-03
Payer: COMMERCIAL

## 2024-09-03 DIAGNOSIS — Z98.890 OTHER SPECIFIED POSTPROCEDURAL STATES: Chronic | ICD-10-CM

## 2024-09-03 DIAGNOSIS — F33.42 MAJOR DEPRESSIVE DISORDER, RECURRENT, IN FULL REMISSION: ICD-10-CM

## 2024-09-03 DIAGNOSIS — F41.1 GENERALIZED ANXIETY DISORDER: ICD-10-CM

## 2024-09-03 PROCEDURE — 99204 OFFICE O/P NEW MOD 45 MIN: CPT | Mod: 95

## 2024-09-03 PROCEDURE — 99214 OFFICE O/P EST MOD 30 MIN: CPT | Mod: 95

## 2024-09-03 NOTE — PLAN
[No] : No [Medication education provided] : Medication education provided. [Rationale for medication choices, possible risks/precautions, benefits, alternative treatment choices, and consequences of non-treatment discussed] : Rationale for medication choices, possible risks/precautions, benefits, alternative treatment choices, and consequences of non-treatment discussed with patient/family/caregiver  [FreeTextEntry5] : RTC in 1 Month Continue regular therapy with Ardita Continue f/u with pain management  -c/w Paroxetine 30 mg daily -c/w Gabapentin 800 mg TID -c/w Quetiapine 50 mg QHS  Medication side effect profile was reviewed and discussed.  Patient was informed of the increased risk of dizziness and falls when combining Gabapentin with opioid analgesic medications.  She was also advised that Gabapentin can cause dose-dependent CNS depression and/or drowsiness when combined with opioid analgesics.  Fatal respiratory depression may also occur with concomitant opioid use.  Patient is aware and acknowledges the risk of the aforementioned.

## 2024-09-03 NOTE — REASON FOR VISIT
[Patient preference] : as per patient preference [Access issues (e.g., transportation, impaired mobility, etc.)] : due to patient's access issues [Continuing, patient not seen in-person within last 12 months (provide details below)] : Telehealth services are continuing, patient not seen in-person within last 12 months.  [Telehealth (audio & video) - Individual/Group] : This visit was provided via telehealth using real-time 2-way audio visual technology. [Medical Office: ([facility])___] : The provider was located at the medical office in [unfilled]. [Home] : The patient, [unfilled], was located at home, [unfilled], at the time of the visit. [Verbal consent obtained from patient/other participant(s)] : Verbal consent for telehealth/telephonic services obtained from patient/other participant(s) [FreeTextEntry4] : 4271 [FreeTextEntry5] : 1400 [FreeTextEntry2] : 8/4/23 [Patient] : Patient [FreeTextEntry1] : Follow-up CC: "Things have been okay"  The patient presented for OPD follow-up via telehealth.

## 2024-09-03 NOTE — HISTORY OF PRESENT ILLNESS
[FreeTextEntry1] : Marta is a 54-year-old English female,  with 5 children, unemployed, living with her oldest son Lawrence in [place], [place].  Marta has PPHx of MDD, KODY and PTSD and is currently on Paroxetine 30 mg daily, Gabapentin 800 mg TID, and Quetiapine 50 mg QHS.  She also has PMHx of chronic back, neck, and shoulder pain (s/p surgery x3, managed by a specialist), as well as asthma.  She takes Percocet 5-325 mg BID PRN and uses medical cannabis drops for pain management.  Her son, Lawrence, functions as a HHA through [facility] and provides her assistance with ADLs/IADLs.  Marta presents today for follow-up appointment.    Today on assessment, Kirsty presents pleasant and smiling.  She informs that she had a nice summer and recalls fondly the time she spent with her son, Roni, when he came to visit.  She informs her mood fluctuated a fair amount and was quite low for a bit when her daughter was dealing with some health-related issues; however, she states that it is good now.  She continues to see a pain management specialist for orthopedic issues and is still taking Percocet twice daily in conjunction with cannabis drops.  She informs that this regimen keeps her at a tolerable level of pain.  She denies the presence of any depressive symptoms at this time and rates her baseline mood 8/10 in nature.  She informs she is keeping busy in her spare time by caring for her 11-year-old grandson and spending time with him brings her much tiffany.  She denies the presence of anxious symptoms at this time.  Marta denies the presence of any adverse effects from her medications.

## 2024-09-03 NOTE — PHYSICAL EXAM
[FreeTextEntry2] : unable to assess [Well groomed] : well groomed [Cooperative] : cooperative [Euthymic] : euthymic [Full] : full [Clear] : clear [Linear/Goal Directed] : linear/goal directed [None] : none [None Reported] : none reported [Average] : average [WNL] : within normal limits

## 2024-09-04 ENCOUNTER — OUTPATIENT (OUTPATIENT)
Dept: OUTPATIENT SERVICES | Facility: HOSPITAL | Age: 54
LOS: 1 days | End: 2024-09-04
Payer: COMMERCIAL

## 2024-09-04 ENCOUNTER — APPOINTMENT (OUTPATIENT)
Dept: PSYCHIATRY | Facility: CLINIC | Age: 54
End: 2024-09-04

## 2024-09-04 DIAGNOSIS — F41.9 ANXIETY DISORDER, UNSPECIFIED: ICD-10-CM

## 2024-09-04 DIAGNOSIS — F25.9 SCHIZOAFFECTIVE DISORDER, UNSPECIFIED: ICD-10-CM

## 2024-09-04 DIAGNOSIS — Z98.890 OTHER SPECIFIED POSTPROCEDURAL STATES: Chronic | ICD-10-CM

## 2024-09-04 DIAGNOSIS — F41.1 GENERALIZED ANXIETY DISORDER: ICD-10-CM

## 2024-09-04 DIAGNOSIS — F33.42 MAJOR DEPRESSIVE DISORDER, RECURRENT, IN FULL REMISSION: ICD-10-CM

## 2024-09-04 PROCEDURE — 90832 PSYTX W PT 30 MINUTES: CPT

## 2024-09-05 DIAGNOSIS — F41.9 ANXIETY DISORDER, UNSPECIFIED: ICD-10-CM

## 2024-09-10 NOTE — REASON FOR VISIT
[Patient preference] : as per patient preference [Telehealth (audio & video) - Individual/Group] : This visit was provided via telehealth using real-time 2-way audio visual technology. [Medical Office: ([facility])___] : The provider was located at the medical office in [unfilled]. [Home] : The patient, [unfilled], was located at home, [unfilled], at the time of the visit. [Verbal consent obtained from patient/other participant(s)] : Verbal consent for telehealth/telephonic services obtained from patient/other participant(s) [Patient] : Patient [FreeTextEntry4] : 11am [FreeTextEntry5] : 11:17am [FreeTextEntry2] : 6/6/23 [FreeTextEntry1] : Anxiety/Depression/ therapy session

## 2024-09-10 NOTE — REASON FOR VISIT
[Patient preference] : as per patient preference [Telehealth (audio & video) - Individual/Group] : This visit was provided via telehealth using real-time 2-way audio visual technology. [Medical Office: ([facility])___] : The provider was located at the medical office in [unfilled]. [Home] : The patient, [unfilled], was located at home, [unfilled], at the time of the visit. [Verbal consent obtained from patient/other participant(s)] : Verbal consent for telehealth/telephonic services obtained from patient/other participant(s) [Patient] : Patient [FreeTextEntry4] : 11am [FreeTextEntry5] : 11:17am [FreeTextEntry2] : 6/6/23 [FreeTextEntry1] : Anxiety/Depression/ therapy session

## 2024-09-10 NOTE — PLAN
[Cognitive and/or Behavior Therapy] : Cognitive and/or Behavior Therapy  [Motivational Interviewing] : Motivational Interviewing  [Supportive Therapy] : Supportive Therapy [Recommended Frequency of Visits: ____] : Recommended frequency of visits: [unfilled] [FreeTextEntry2] : Physical Health.    Goal #1  (In patient's words): "i want to be better, i don't want to depend on other people".    Objective A: Take medications/treatments as prescribed on a daily basis (x1)    Status of Objective: Continued - Progress made: Patient continues to adhere to medication management.    Objective B: Maintain good overall physical health and healthcare practices such as doing light exercises from chair every morning, and going out using her walker 2-3 times a week     Mental Health.  Goal #2-(In patient's words): "I want to have less depression and anxiety so i can enjoy my family".  Objective A: Reduce feeling anxious by using coping skills learned in therapy such as deep breathing, positive self talk and lower GAD7 by 3 points until next treatment plan.  Objective B: Reduce feeling of depression and crying spells at least 2 times a week. Reduce PHQ9 scoring by 3 points until next treatment plan review. Current score 14.  [de-identified] : Session began at 11am and ended at 11:17am Patient was seen via telehealth this morning. Patient is doing well; she was at home in her living room resting. The patient states that she is now pre diabetic. She has a lot of pinched nerves on her neck and back. Patient states that she was approved for medical marijuana and has purchased THC drops and finds it to be helping with pain. The patient appeared in a better mood and was not complaining so much about pain, she states with the help of pain meds and THC she is finding some relief finally.    Patient has kids and her daughter was visiting with her kids patient enjoys their company. They cook and clean for her and tend to her needs. She has great support system at home.   Patient is working towards the treatment goals, she takes her medications as prescribed and visits all her doctors. Patient is learning to use coping skills to reduce anxiety and depression by talking during therapy and doing activities to help.          During the session, supportive therapy was provided, motivational interviewing as well. Patient will continue to take the medications as prescribed and maintain physical health, such as doing light exercises in the house, like using a chair, or doing yoga, using the YouTube channel, going for short walks whenever she has a chance and the availability of her children. Patient will continue to work on her arts and crafts, which she reports she enjoys and finds it helps with her anxiety. Patient denies any suicidal, homicidal ideations or any self-harming behaviors.  [FreeTextEntry1] : 2-3 week sessions/.

## 2024-09-10 NOTE — PLAN
[Cognitive and/or Behavior Therapy] : Cognitive and/or Behavior Therapy  [Motivational Interviewing] : Motivational Interviewing  [Supportive Therapy] : Supportive Therapy [Recommended Frequency of Visits: ____] : Recommended frequency of visits: [unfilled] [FreeTextEntry2] : Physical Health.    Goal #1  (In patient's words): "i want to be better, i don't want to depend on other people".    Objective A: Take medications/treatments as prescribed on a daily basis (x1)    Status of Objective: Continued - Progress made: Patient continues to adhere to medication management.    Objective B: Maintain good overall physical health and healthcare practices such as doing light exercises from chair every morning, and going out using her walker 2-3 times a week     Mental Health.  Goal #2-(In patient's words): "I want to have less depression and anxiety so i can enjoy my family".  Objective A: Reduce feeling anxious by using coping skills learned in therapy such as deep breathing, positive self talk and lower GAD7 by 3 points until next treatment plan.  Objective B: Reduce feeling of depression and crying spells at least 2 times a week. Reduce PHQ9 scoring by 3 points until next treatment plan review. Current score 14.  [de-identified] : Session began at 11am and ended at 11:17am Patient was seen via telehealth this morning. Patient is doing well; she was at home in her living room resting. The patient states that she is now pre diabetic. She has a lot of pinched nerves on her neck and back. Patient states that she was approved for medical marijuana and has purchased THC drops and finds it to be helping with pain. The patient appeared in a better mood and was not complaining so much about pain, she states with the help of pain meds and THC she is finding some relief finally.    Patient has kids and her daughter was visiting with her kids patient enjoys their company. They cook and clean for her and tend to her needs. She has great support system at home.   Patient is working towards the treatment goals, she takes her medications as prescribed and visits all her doctors. Patient is learning to use coping skills to reduce anxiety and depression by talking during therapy and doing activities to help.          During the session, supportive therapy was provided, motivational interviewing as well. Patient will continue to take the medications as prescribed and maintain physical health, such as doing light exercises in the house, like using a chair, or doing yoga, using the YouTube channel, going for short walks whenever she has a chance and the availability of her children. Patient will continue to work on her arts and crafts, which she reports she enjoys and finds it helps with her anxiety. Patient denies any suicidal, homicidal ideations or any self-harming behaviors.  [FreeTextEntry1] : 2-3 week sessions/.

## 2024-09-18 ENCOUNTER — APPOINTMENT (OUTPATIENT)
Dept: PSYCHIATRY | Facility: CLINIC | Age: 54
End: 2024-09-18

## 2024-09-18 NOTE — DISCUSSION/SUMMARY
[FreeTextEntry1] : Patient was not able to join [facility] telehealth today, it was telling her unavailable. After multiple trials we decided to reschedule.

## 2024-09-23 NOTE — BRIEF OPERATIVE NOTE - ESTIMATED BLOOD LOSS
"Pt awake at the start of the overnight shift c/o severe flank pain and nausea, both related to current exacerbation of chronic urinary-related issues, namely- kidney stones. CT was completed on previous shift, revealing 2 small calculi. Urology and internal medicine have are following. Ferreira catheter in place currently. Pt has been given PRN Tylenol and Flexeril during previous shifts to manage pain, however, he reports these have been minimally effective. Writer contacted on-call psych and medical providers for orders for additional pain management measures. PRN Tramadol was ordered with a dose being given at 0206. Pt has also been given PRN Klonopin and Zofran. He has also been provided with heat packs and Ginger Ale. Upon re-assessment around 0315, pt's pain level continued to be high (10/10) and causing him distress. On-call medical provider contacted and he placed orders for IV access and IV Toradol. Medical flyer placed p. IV in left hand and administered Toradol around 0400. Upon re-assessment around 0500, pt continued to report 9/10 pain. Medicine provider informed and order was placed for IV Dilaudid. Currently still waiting for the dose to arrive to the unit before contacting medical flyer for administration. Pt is aware and accepting of this. Total output from ferreira this shift was 1,450 mL. Pt also reporting \"spasms\" and the \"urge\" to urinate. 1:1 SIO continues in place for SI/SIB risk. Pt has slept 0 hours d/t pain/discomfort.     Problem: Adult Inpatient Plan of Care  Goal: Optimal Comfort and Wellbeing  Intervention: Monitor Pain and Promote Comfort  Recent Flowsheet Documentation  Taken 9/23/2024 0147 by Nohemi John, RN  Pain Management Interventions:   medication (see MAR)   ambulation/increased activity   emotional support   heat applied     Problem: Sleep Disturbance  Goal: Adequate Sleep/Rest  Outcome: Progressing     " 100

## 2024-09-24 ENCOUNTER — OUTPATIENT (OUTPATIENT)
Dept: OUTPATIENT SERVICES | Facility: HOSPITAL | Age: 54
LOS: 1 days | End: 2024-09-24
Payer: COMMERCIAL

## 2024-09-24 ENCOUNTER — APPOINTMENT (OUTPATIENT)
Dept: PSYCHIATRY | Facility: CLINIC | Age: 54
End: 2024-09-24

## 2024-09-24 DIAGNOSIS — Z98.890 OTHER SPECIFIED POSTPROCEDURAL STATES: Chronic | ICD-10-CM

## 2024-09-24 DIAGNOSIS — F41.9 ANXIETY DISORDER, UNSPECIFIED: ICD-10-CM

## 2024-09-24 DIAGNOSIS — F33.1 MAJOR DEPRESSIVE DISORDER, RECURRENT, MODERATE: ICD-10-CM

## 2024-09-24 PROCEDURE — 90832 PSYTX W PT 30 MINUTES: CPT

## 2024-09-24 NOTE — REASON FOR VISIT
[Patient preference] : as per patient preference [Telehealth (audio & video) - Individual/Group] : This visit was provided via telehealth using real-time 2-way audio visual technology. [Medical Office: ([facility])___] : The provider was located at the medical office in [unfilled]. [Home] : The patient, [unfilled], was located at home, [unfilled], at the time of the visit. [Verbal consent obtained from patient/other participant(s)] : Verbal consent for telehealth/telephonic services obtained from patient/other participant(s) [FreeTextEntry4] : 12pm [FreeTextEntry5] : 12:17pm [FreeTextEntry2] : 6/6/23 [Patient] : Patient [FreeTextEntry1] : Anxiety/Depression/ therapy session

## 2024-09-24 NOTE — PLAN
[FreeTextEntry2] : Physical Health.    Goal #1  (In patient's words): "i want to be better, i don't want to depend on other people".    Objective A: Take medications/treatments as prescribed on a daily basis (x1)    Status of Objective: Continued - Progress made: Patient continues to adhere to medication management.    Objective B: Maintain good overall physical health and healthcare practices such as doing light exercises from chair every morning, and going out using her walker 2-3 times a week     Mental Health.  Goal #2-(In patient's words): "I want to have less depression and anxiety so i can enjoy my family".  Objective A: Reduce feeling anxious by using coping skills learned in therapy such as deep breathing, positive self talk and lower GAD7 by 3 points until next treatment plan.  Objective B: Reduce feeling of depression and crying spells at least 2 times a week. Reduce PHQ9 scoring by 3 points until next treatment plan review. Current score 14.  [Cognitive and/or Behavior Therapy] : Cognitive and/or Behavior Therapy  [Motivational Interviewing] : Motivational Interviewing  [Supportive Therapy] : Supportive Therapy [de-identified] : Session began at 12pm and ended at 12:17pm   Patient was seen via telehealth this morning. Patient is doing well; she was at home in her living room resting. The patient had a bad cough she said she got it from her grandson but no other symptoms. Advised patient to see a doctor if cough persists.    The patient states that she is now pre diabetic. She has a lot of pinched nerves on her neck and back. The patient states that she was approved for medical marijuana and has purchased THC drops and finds it to be helping with pain. The patient appeared in a better mood and was not complaining so much about pain, she states with the help of pain meds and THC she is finding some relief finally.     Patient is working towards the treatment goals; she takes her medications as prescribed and visits all her doctors. The patient is learning to use coping skills to reduce anxiety and depression by talking during therapy and doing activities to help.       During the session, supportive therapy was provided, motivational interviewing as well. Patient will continue to take the medications as prescribed and maintain physical health, such as doing light exercises in the house, like using a chair, or doing yoga, using the [employer] channel, going for short walks whenever she has a chance and the availability of her children. Patient will continue to work on her arts and crafts, which she reports she enjoys and finds it helps with her anxiety. Patient denies any suicidal, homicidal ideations or any self-harming behaviors.  [Recommended Frequency of Visits: ____] : Recommended frequency of visits: [unfilled] [FreeTextEntry1] : 2-3 week sessions/.

## 2024-09-24 NOTE — REASON FOR VISIT
[Patient preference] : as per patient preference [Telehealth (audio & video) - Individual/Group] : This visit was provided via telehealth using real-time 2-way audio visual technology. [Medical Office: ([facility])___] : The provider was located at the medical office in [unfilled]. [Home] : The patient, [unfilled], was located at home, [unfilled], at the time of the visit. [Verbal consent obtained from patient/other participant(s)] : Verbal consent for telehealth/telephonic services obtained from patient/other participant(s) [FreeTextEntry4] : 12pm [FreeTextEntry5] : 12:17pm [FreeTextEntry2] : 6/6/23 [Patient] : Patient [FreeTextEntry1] : Anxiety/Depression/ therapy session

## 2024-09-24 NOTE — PLAN
[FreeTextEntry2] : Physical Health.    Goal #1  (In patient's words): "i want to be better, i don't want to depend on other people".    Objective A: Take medications/treatments as prescribed on a daily basis (x1)    Status of Objective: Continued - Progress made: Patient continues to adhere to medication management.    Objective B: Maintain good overall physical health and healthcare practices such as doing light exercises from chair every morning, and going out using her walker 2-3 times a week     Mental Health.  Goal #2-(In patient's words): "I want to have less depression and anxiety so i can enjoy my family".  Objective A: Reduce feeling anxious by using coping skills learned in therapy such as deep breathing, positive self talk and lower GAD7 by 3 points until next treatment plan.  Objective B: Reduce feeling of depression and crying spells at least 2 times a week. Reduce PHQ9 scoring by 3 points until next treatment plan review. Current score 14.  [Cognitive and/or Behavior Therapy] : Cognitive and/or Behavior Therapy  [Motivational Interviewing] : Motivational Interviewing  [Supportive Therapy] : Supportive Therapy [de-identified] : Session began at 12pm and ended at 12:17pm   Patient was seen via telehealth this morning. Patient is doing well; she was at home in her living room resting. The patient had a bad cough she said she got it from her grandson but no other symptoms. Advised patient to see a doctor if cough persists.    The patient states that she is now pre diabetic. She has a lot of pinched nerves on her neck and back. The patient states that she was approved for medical marijuana and has purchased THC drops and finds it to be helping with pain. The patient appeared in a better mood and was not complaining so much about pain, she states with the help of pain meds and THC she is finding some relief finally.     Patient is working towards the treatment goals; she takes her medications as prescribed and visits all her doctors. The patient is learning to use coping skills to reduce anxiety and depression by talking during therapy and doing activities to help.       During the session, supportive therapy was provided, motivational interviewing as well. Patient will continue to take the medications as prescribed and maintain physical health, such as doing light exercises in the house, like using a chair, or doing yoga, using the [employer] channel, going for short walks whenever she has a chance and the availability of her children. Patient will continue to work on her arts and crafts, which she reports she enjoys and finds it helps with her anxiety. Patient denies any suicidal, homicidal ideations or any self-harming behaviors.  [Recommended Frequency of Visits: ____] : Recommended frequency of visits: [unfilled] [FreeTextEntry1] : 2-3 week sessions/.

## 2024-09-25 DIAGNOSIS — F41.9 ANXIETY DISORDER, UNSPECIFIED: ICD-10-CM

## 2024-10-01 ENCOUNTER — OUTPATIENT (OUTPATIENT)
Dept: OUTPATIENT SERVICES | Facility: HOSPITAL | Age: 54
LOS: 1 days | End: 2024-10-01
Payer: COMMERCIAL

## 2024-10-01 ENCOUNTER — APPOINTMENT (OUTPATIENT)
Dept: PSYCHIATRY | Facility: CLINIC | Age: 54
End: 2024-10-01
Payer: COMMERCIAL

## 2024-10-01 DIAGNOSIS — Z98.890 OTHER SPECIFIED POSTPROCEDURAL STATES: Chronic | ICD-10-CM

## 2024-10-01 DIAGNOSIS — F33.42 MAJOR DEPRESSIVE DISORDER, RECURRENT, IN FULL REMISSION: ICD-10-CM

## 2024-10-01 DIAGNOSIS — F41.1 GENERALIZED ANXIETY DISORDER: ICD-10-CM

## 2024-10-01 PROCEDURE — 99214 OFFICE O/P EST MOD 30 MIN: CPT | Mod: 95

## 2024-10-01 NOTE — HISTORY OF PRESENT ILLNESS
[FreeTextEntry1] : Marta is a 54-year-old [place] female,  with 5 children, unemployed, living with her oldest son Lawrence in [place], [place].  Marta has PPHx of MDD, KODY and PTSD and is currently on Paroxetine 30 mg daily, Gabapentin 800 mg TID, and Quetiapine 50 mg QHS.  She also has PMHx of chronic back, neck, and shoulder pain (s/p surgery x3, managed by a specialist), as well as asthma.  She takes Percocet 5-325 mg BID PRN and uses Ayrloom THC tincture drops 2-3x daily for pain management.  Her son, Lawrence, functions as a HHA through [facility] and provides her assistance with ADLs/IADLs.  Marta presents today for follow-up appointment.    Today on assessment, Kirsty presents pleasant and smiling.  She informs that she has been good since her last appointment and states that her daughter dropped her off some reading material recently that has been keeping her busy.  She has also been painting to pass the time.  She informs that she is functioning at a tolerable level of pain, although, her back has hurt more recently with the weather change.  She denies the presence of any depressive symptoms at this time and rates her baseline mood as "good."  She denies the presence of anxious symptoms at this time.  Marta denies the presence of any adverse effects from her medications.  She is sleeping well with Seroquel.

## 2024-10-01 NOTE — REASON FOR VISIT
[Patient preference] : as per patient preference [Access issues (e.g., transportation, impaired mobility, etc.)] : due to patient's access issues [Continuing, patient not seen in-person within last 12 months (provide details below)] : Telehealth services are continuing, patient not seen in-person within last 12 months.  [Telehealth (audio & video) - Individual/Group] : This visit was provided via telehealth using real-time 2-way audio visual technology. [Medical Office: ([facility])___] : The provider was located at the medical office in [unfilled]. [Home] : The patient, [unfilled], was located at home, [unfilled], at the time of the visit. [Verbal consent obtained from patient/other participant(s)] : Verbal consent for telehealth/telephonic services obtained from patient/other participant(s) [FreeTextEntry4] : 5274 [FreeTextEntry5] : 2538 [FreeTextEntry2] : 8/4/23 [Patient] : Patient [FreeTextEntry1] : Follow-up CC: "Everything is okay...I'm better"  The patient presented for OPD follow-up via telehealth.

## 2024-10-01 NOTE — PLAN
[Medication education provided] : Medication education provided. [No] : No [Rationale for medication choices, possible risks/precautions, benefits, alternative treatment choices, and consequences of non-treatment discussed] : Rationale for medication choices, possible risks/precautions, benefits, alternative treatment choices, and consequences of non-treatment discussed with patient/family/caregiver  [FreeTextEntry5] : RTC in 6 Weeks Continue regular therapy with Ardita Continue f/u with pain management  -c/w Paroxetine 30 mg daily -c/w Gabapentin 800 mg TID -c/w Quetiapine 50 mg QHS  Medication side effect profile was reviewed and discussed.  Patient was informed of the increased risk of dizziness and falls when combining Gabapentin with opioid analgesic medications.  She was also advised that Gabapentin can cause dose-dependent CNS depression and/or drowsiness when combined with opioid analgesics.  Fatal respiratory depression may also occur with concomitant opioid use.  Patient is aware and acknowledges the risk of the aforementioned.

## 2024-10-02 DIAGNOSIS — F41.1 GENERALIZED ANXIETY DISORDER: ICD-10-CM

## 2024-10-02 DIAGNOSIS — F33.42 MAJOR DEPRESSIVE DISORDER, RECURRENT, IN FULL REMISSION: ICD-10-CM

## 2024-10-08 ENCOUNTER — OUTPATIENT (OUTPATIENT)
Dept: OUTPATIENT SERVICES | Facility: HOSPITAL | Age: 54
LOS: 1 days | Discharge: HOME | End: 2024-10-08
Payer: COMMERCIAL

## 2024-10-08 ENCOUNTER — APPOINTMENT (OUTPATIENT)
Dept: PSYCHIATRY | Facility: CLINIC | Age: 54
End: 2024-10-08

## 2024-10-08 DIAGNOSIS — Z98.890 OTHER SPECIFIED POSTPROCEDURAL STATES: Chronic | ICD-10-CM

## 2024-10-08 PROCEDURE — 90832 PSYTX W PT 30 MINUTES: CPT

## 2024-10-21 ENCOUNTER — NON-APPOINTMENT (OUTPATIENT)
Age: 54
End: 2024-10-21

## 2024-10-22 ENCOUNTER — NON-APPOINTMENT (OUTPATIENT)
Age: 54
End: 2024-10-22

## 2024-10-22 ENCOUNTER — APPOINTMENT (OUTPATIENT)
Dept: PSYCHIATRY | Facility: CLINIC | Age: 54
End: 2024-10-22

## 2024-10-22 ENCOUNTER — OUTPATIENT (OUTPATIENT)
Dept: OUTPATIENT SERVICES | Facility: HOSPITAL | Age: 54
LOS: 1 days | End: 2024-10-22
Payer: COMMERCIAL

## 2024-10-22 DIAGNOSIS — Z98.890 OTHER SPECIFIED POSTPROCEDURAL STATES: Chronic | ICD-10-CM

## 2024-10-22 DIAGNOSIS — F41.9 ANXIETY DISORDER, UNSPECIFIED: ICD-10-CM

## 2024-10-22 PROCEDURE — 90832 PSYTX W PT 30 MINUTES: CPT

## 2024-10-31 ENCOUNTER — OUTPATIENT (OUTPATIENT)
Dept: OUTPATIENT SERVICES | Facility: HOSPITAL | Age: 54
LOS: 1 days | End: 2024-10-31
Payer: MEDICAID

## 2024-10-31 ENCOUNTER — APPOINTMENT (OUTPATIENT)
Dept: DERMATOLOGY | Facility: CLINIC | Age: 54
End: 2024-10-31

## 2024-10-31 DIAGNOSIS — L21.9 SEBORRHEIC DERMATITIS, UNSPECIFIED: ICD-10-CM

## 2024-10-31 DIAGNOSIS — Z98.890 OTHER SPECIFIED POSTPROCEDURAL STATES: Chronic | ICD-10-CM

## 2024-10-31 DIAGNOSIS — Z00.00 ENCOUNTER FOR GENERAL ADULT MEDICAL EXAMINATION WITHOUT ABNORMAL FINDINGS: ICD-10-CM

## 2024-10-31 PROCEDURE — 99213 OFFICE O/P EST LOW 20 MIN: CPT

## 2024-11-05 ENCOUNTER — OUTPATIENT (OUTPATIENT)
Dept: OUTPATIENT SERVICES | Facility: HOSPITAL | Age: 54
LOS: 1 days | End: 2024-11-05
Payer: COMMERCIAL

## 2024-11-05 ENCOUNTER — APPOINTMENT (OUTPATIENT)
Dept: PSYCHIATRY | Facility: CLINIC | Age: 54
End: 2024-11-05

## 2024-11-05 DIAGNOSIS — F41.9 ANXIETY DISORDER, UNSPECIFIED: ICD-10-CM

## 2024-11-05 DIAGNOSIS — L21.9 SEBORRHEIC DERMATITIS, UNSPECIFIED: ICD-10-CM

## 2024-11-05 DIAGNOSIS — Z98.890 OTHER SPECIFIED POSTPROCEDURAL STATES: Chronic | ICD-10-CM

## 2024-11-05 PROCEDURE — 90832 PSYTX W PT 30 MINUTES: CPT

## 2024-11-06 DIAGNOSIS — F41.9 ANXIETY DISORDER, UNSPECIFIED: ICD-10-CM

## 2024-11-12 ENCOUNTER — APPOINTMENT (OUTPATIENT)
Dept: PSYCHIATRY | Facility: CLINIC | Age: 54
End: 2024-11-12
Payer: COMMERCIAL

## 2024-11-12 ENCOUNTER — OUTPATIENT (OUTPATIENT)
Dept: OUTPATIENT SERVICES | Facility: HOSPITAL | Age: 54
LOS: 1 days | End: 2024-11-12
Payer: COMMERCIAL

## 2024-11-12 DIAGNOSIS — Z98.890 OTHER SPECIFIED POSTPROCEDURAL STATES: Chronic | ICD-10-CM

## 2024-11-12 DIAGNOSIS — F41.1 GENERALIZED ANXIETY DISORDER: ICD-10-CM

## 2024-11-12 DIAGNOSIS — F43.12 POST-TRAUMATIC STRESS DISORDER, CHRONIC: ICD-10-CM

## 2024-11-12 DIAGNOSIS — F33.42 MAJOR DEPRESSIVE DISORDER, RECURRENT, IN FULL REMISSION: ICD-10-CM

## 2024-11-12 PROCEDURE — 99214 OFFICE O/P EST MOD 30 MIN: CPT | Mod: 95

## 2024-11-13 DIAGNOSIS — F43.12 POST-TRAUMATIC STRESS DISORDER, CHRONIC: ICD-10-CM

## 2024-11-13 DIAGNOSIS — F41.1 GENERALIZED ANXIETY DISORDER: ICD-10-CM

## 2024-11-13 DIAGNOSIS — F33.42 MAJOR DEPRESSIVE DISORDER, RECURRENT, IN FULL REMISSION: ICD-10-CM

## 2024-11-20 ENCOUNTER — OUTPATIENT (OUTPATIENT)
Dept: OUTPATIENT SERVICES | Facility: HOSPITAL | Age: 54
LOS: 1 days | End: 2024-11-20
Payer: COMMERCIAL

## 2024-11-20 ENCOUNTER — APPOINTMENT (OUTPATIENT)
Dept: PSYCHIATRY | Facility: CLINIC | Age: 54
End: 2024-11-20

## 2024-11-20 DIAGNOSIS — Z98.890 OTHER SPECIFIED POSTPROCEDURAL STATES: Chronic | ICD-10-CM

## 2024-11-20 PROCEDURE — 90832 PSYTX W PT 30 MINUTES: CPT

## 2024-11-22 ENCOUNTER — NON-APPOINTMENT (OUTPATIENT)
Age: 54
End: 2024-11-22

## 2024-11-22 ENCOUNTER — OUTPATIENT (OUTPATIENT)
Dept: OUTPATIENT SERVICES | Facility: HOSPITAL | Age: 54
LOS: 1 days | End: 2024-11-22
Payer: MEDICAID

## 2024-11-22 ENCOUNTER — APPOINTMENT (OUTPATIENT)
Dept: INTERNAL MEDICINE | Facility: CLINIC | Age: 54
End: 2024-11-22

## 2024-11-22 VITALS
HEART RATE: 90 BPM | BODY MASS INDEX: 25.9 KG/M2 | WEIGHT: 165 LBS | OXYGEN SATURATION: 97 % | TEMPERATURE: 96 F | DIASTOLIC BLOOD PRESSURE: 78 MMHG | HEIGHT: 67 IN | SYSTOLIC BLOOD PRESSURE: 133 MMHG

## 2024-11-22 DIAGNOSIS — M54.9 DORSALGIA, UNSPECIFIED: ICD-10-CM

## 2024-11-22 DIAGNOSIS — Z98.890 OTHER SPECIFIED POSTPROCEDURAL STATES: Chronic | ICD-10-CM

## 2024-11-22 DIAGNOSIS — R30.0 DYSURIA: ICD-10-CM

## 2024-11-22 DIAGNOSIS — Z00.00 ENCOUNTER FOR GENERAL ADULT MEDICAL EXAMINATION W/OUT ABNORMAL FINDINGS: ICD-10-CM

## 2024-11-22 DIAGNOSIS — F41.1 GENERALIZED ANXIETY DISORDER: ICD-10-CM

## 2024-11-22 DIAGNOSIS — G89.29 DORSALGIA, UNSPECIFIED: ICD-10-CM

## 2024-11-22 DIAGNOSIS — Z00.00 ENCOUNTER FOR GENERAL ADULT MEDICAL EXAMINATION WITHOUT ABNORMAL FINDINGS: ICD-10-CM

## 2024-11-22 PROCEDURE — 87591 N.GONORRHOEAE DNA AMP PROB: CPT | Mod: XU

## 2024-11-22 PROCEDURE — 99214 OFFICE O/P EST MOD 30 MIN: CPT

## 2024-11-22 PROCEDURE — 87801 DETECT AGNT MULT DNA AMPLI: CPT

## 2024-11-22 PROCEDURE — G2211 COMPLEX E/M VISIT ADD ON: CPT | Mod: NC

## 2024-11-22 PROCEDURE — 87661 TRICHOMONAS VAGINALIS AMPLIF: CPT | Mod: XU

## 2024-11-22 PROCEDURE — 87798 DETECT AGENT NOS DNA AMP: CPT | Mod: XU

## 2024-11-22 PROCEDURE — 87491 CHLMYD TRACH DNA AMP PROBE: CPT | Mod: XU

## 2024-11-22 RX ORDER — ESTRADIOL 0.1 MG/G
0.1 CREAM VAGINAL
Qty: 1 | Refills: 0 | Status: ACTIVE | COMMUNITY
Start: 2024-11-22 | End: 1900-01-01

## 2024-12-03 DIAGNOSIS — F41.1 GENERALIZED ANXIETY DISORDER: ICD-10-CM

## 2024-12-03 DIAGNOSIS — R30.0 DYSURIA: ICD-10-CM

## 2024-12-04 ENCOUNTER — OUTPATIENT (OUTPATIENT)
Dept: OUTPATIENT SERVICES | Facility: HOSPITAL | Age: 54
LOS: 1 days | End: 2024-12-04
Payer: COMMERCIAL

## 2024-12-04 ENCOUNTER — APPOINTMENT (OUTPATIENT)
Dept: PSYCHIATRY | Facility: CLINIC | Age: 54
End: 2024-12-04

## 2024-12-04 DIAGNOSIS — Z98.890 OTHER SPECIFIED POSTPROCEDURAL STATES: Chronic | ICD-10-CM

## 2024-12-04 PROCEDURE — 90832 PSYTX W PT 30 MINUTES: CPT

## 2024-12-05 ENCOUNTER — NON-APPOINTMENT (OUTPATIENT)
Age: 54
End: 2024-12-05

## 2024-12-11 ENCOUNTER — APPOINTMENT (OUTPATIENT)
Dept: NUTRITION | Facility: CLINIC | Age: 54
End: 2024-12-11

## 2024-12-11 ENCOUNTER — OUTPATIENT (OUTPATIENT)
Dept: OUTPATIENT SERVICES | Facility: HOSPITAL | Age: 54
LOS: 1 days | End: 2024-12-11
Payer: MEDICAID

## 2024-12-11 DIAGNOSIS — Z98.890 OTHER SPECIFIED POSTPROCEDURAL STATES: Chronic | ICD-10-CM

## 2024-12-11 DIAGNOSIS — R73.03 PREDIABETES: ICD-10-CM

## 2024-12-11 PROCEDURE — 97803 MED NUTRITION INDIV SUBSEQ: CPT

## 2024-12-12 DIAGNOSIS — R73.03 PREDIABETES: ICD-10-CM

## 2024-12-17 ENCOUNTER — APPOINTMENT (OUTPATIENT)
Dept: PSYCHIATRY | Facility: CLINIC | Age: 54
End: 2024-12-17

## 2024-12-17 ENCOUNTER — OUTPATIENT (OUTPATIENT)
Dept: OUTPATIENT SERVICES | Facility: HOSPITAL | Age: 54
LOS: 1 days | End: 2024-12-17
Payer: COMMERCIAL

## 2024-12-17 DIAGNOSIS — Z98.890 OTHER SPECIFIED POSTPROCEDURAL STATES: Chronic | ICD-10-CM

## 2024-12-17 PROCEDURE — 90832 PSYTX W PT 30 MINUTES: CPT

## 2024-12-18 ENCOUNTER — NON-APPOINTMENT (OUTPATIENT)
Age: 54
End: 2024-12-18

## 2024-12-20 ENCOUNTER — NON-APPOINTMENT (OUTPATIENT)
Age: 54
End: 2024-12-20

## 2024-12-23 ENCOUNTER — OUTPATIENT (OUTPATIENT)
Dept: OUTPATIENT SERVICES | Facility: HOSPITAL | Age: 54
LOS: 1 days | End: 2024-12-23
Payer: COMMERCIAL

## 2024-12-23 ENCOUNTER — APPOINTMENT (OUTPATIENT)
Dept: PSYCHIATRY | Facility: CLINIC | Age: 54
End: 2024-12-23
Payer: COMMERCIAL

## 2024-12-23 DIAGNOSIS — F41.1 GENERALIZED ANXIETY DISORDER: ICD-10-CM

## 2024-12-23 DIAGNOSIS — Z98.890 OTHER SPECIFIED POSTPROCEDURAL STATES: Chronic | ICD-10-CM

## 2024-12-23 DIAGNOSIS — F33.42 MAJOR DEPRESSIVE DISORDER, RECURRENT, IN FULL REMISSION: ICD-10-CM

## 2024-12-23 DIAGNOSIS — F43.12 POST-TRAUMATIC STRESS DISORDER, CHRONIC: ICD-10-CM

## 2024-12-23 PROCEDURE — 99214 OFFICE O/P EST MOD 30 MIN: CPT | Mod: 95

## 2024-12-24 DIAGNOSIS — F43.12 POST-TRAUMATIC STRESS DISORDER, CHRONIC: ICD-10-CM

## 2024-12-24 DIAGNOSIS — F33.42 MAJOR DEPRESSIVE DISORDER, RECURRENT, IN FULL REMISSION: ICD-10-CM

## 2024-12-24 DIAGNOSIS — F41.1 GENERALIZED ANXIETY DISORDER: ICD-10-CM

## 2024-12-31 ENCOUNTER — NON-APPOINTMENT (OUTPATIENT)
Age: 54
End: 2024-12-31

## 2025-01-07 ENCOUNTER — NON-APPOINTMENT (OUTPATIENT)
Age: 55
End: 2025-01-07

## 2025-01-08 ENCOUNTER — OUTPATIENT (OUTPATIENT)
Dept: OUTPATIENT SERVICES | Facility: HOSPITAL | Age: 55
LOS: 1 days | End: 2025-01-08
Payer: MEDICAID

## 2025-01-08 ENCOUNTER — APPOINTMENT (OUTPATIENT)
Dept: GASTROENTEROLOGY | Facility: CLINIC | Age: 55
End: 2025-01-08
Payer: MEDICAID

## 2025-01-08 VITALS
BODY MASS INDEX: 25.27 KG/M2 | DIASTOLIC BLOOD PRESSURE: 58 MMHG | OXYGEN SATURATION: 98 % | WEIGHT: 161 LBS | HEART RATE: 56 BPM | TEMPERATURE: 97.3 F | SYSTOLIC BLOOD PRESSURE: 86 MMHG | HEIGHT: 67 IN

## 2025-01-08 DIAGNOSIS — K21.9 GASTRO-ESOPHAGEAL REFLUX DISEASE W/OUT ESOPHAGITIS: ICD-10-CM

## 2025-01-08 DIAGNOSIS — Z12.11 ENCOUNTER FOR SCREENING FOR MALIGNANT NEOPLASM OF COLON: ICD-10-CM

## 2025-01-08 DIAGNOSIS — D13.1 BENIGN NEOPLASM OF STOMACH: ICD-10-CM

## 2025-01-08 DIAGNOSIS — Z98.890 OTHER SPECIFIED POSTPROCEDURAL STATES: Chronic | ICD-10-CM

## 2025-01-08 DIAGNOSIS — Z00.00 ENCOUNTER FOR GENERAL ADULT MEDICAL EXAMINATION WITHOUT ABNORMAL FINDINGS: ICD-10-CM

## 2025-01-08 DIAGNOSIS — R10.9 UNSPECIFIED ABDOMINAL PAIN: ICD-10-CM

## 2025-01-08 DIAGNOSIS — K31.A0 GASTRIC INTESTINAL METAPLASIA, UNSPECIFIED: ICD-10-CM

## 2025-01-08 PROCEDURE — 99203 OFFICE O/P NEW LOW 30 MIN: CPT

## 2025-01-08 PROCEDURE — 87086 URINE CULTURE/COLONY COUNT: CPT

## 2025-01-08 PROCEDURE — 85027 COMPLETE CBC AUTOMATED: CPT

## 2025-01-08 PROCEDURE — 87077 CULTURE AEROBIC IDENTIFY: CPT

## 2025-01-08 PROCEDURE — 80053 COMPREHEN METABOLIC PANEL: CPT

## 2025-01-08 PROCEDURE — 83036 HEMOGLOBIN GLYCOSYLATED A1C: CPT

## 2025-01-08 PROCEDURE — 99213 OFFICE O/P EST LOW 20 MIN: CPT

## 2025-01-08 RX ORDER — POLYETHYLENE GLYCOL 3350 AND ELECTROLYTES WITH LEMON FLAVOR 236; 22.74; 6.74; 5.86; 2.97 G/4L; G/4L; G/4L; G/4L; G/4L
236 POWDER, FOR SOLUTION ORAL
Qty: 1 | Refills: 0 | Status: ACTIVE | COMMUNITY
Start: 2025-01-08 | End: 1900-01-01

## 2025-01-09 DIAGNOSIS — Z00.00 ENCOUNTER FOR GENERAL ADULT MEDICAL EXAMINATION WITHOUT ABNORMAL FINDINGS: ICD-10-CM

## 2025-01-13 ENCOUNTER — OUTPATIENT (OUTPATIENT)
Dept: OUTPATIENT SERVICES | Facility: HOSPITAL | Age: 55
LOS: 1 days | End: 2025-01-13
Payer: MEDICAID

## 2025-01-13 ENCOUNTER — APPOINTMENT (OUTPATIENT)
Dept: PSYCHIATRY | Facility: CLINIC | Age: 55
End: 2025-01-13

## 2025-01-13 DIAGNOSIS — Z98.890 OTHER SPECIFIED POSTPROCEDURAL STATES: Chronic | ICD-10-CM

## 2025-01-13 DIAGNOSIS — F41.9 ANXIETY DISORDER, UNSPECIFIED: ICD-10-CM

## 2025-01-13 PROCEDURE — 90832 PSYTX W PT 30 MINUTES: CPT

## 2025-01-14 DIAGNOSIS — F41.9 ANXIETY DISORDER, UNSPECIFIED: ICD-10-CM

## 2025-01-17 ENCOUNTER — RESULT REVIEW (OUTPATIENT)
Age: 55
End: 2025-01-17

## 2025-01-17 ENCOUNTER — OUTPATIENT (OUTPATIENT)
Dept: OUTPATIENT SERVICES | Facility: HOSPITAL | Age: 55
LOS: 1 days | End: 2025-01-17
Payer: MEDICAID

## 2025-01-17 DIAGNOSIS — Z12.31 ENCOUNTER FOR SCREENING MAMMOGRAM FOR MALIGNANT NEOPLASM OF BREAST: ICD-10-CM

## 2025-01-17 DIAGNOSIS — Z98.890 OTHER SPECIFIED POSTPROCEDURAL STATES: Chronic | ICD-10-CM

## 2025-01-17 PROCEDURE — 77067 SCR MAMMO BI INCL CAD: CPT

## 2025-01-17 PROCEDURE — 77067 SCR MAMMO BI INCL CAD: CPT | Mod: 26

## 2025-01-17 PROCEDURE — 77063 BREAST TOMOSYNTHESIS BI: CPT

## 2025-01-17 PROCEDURE — 77063 BREAST TOMOSYNTHESIS BI: CPT | Mod: 26

## 2025-01-18 DIAGNOSIS — Z12.31 ENCOUNTER FOR SCREENING MAMMOGRAM FOR MALIGNANT NEOPLASM OF BREAST: ICD-10-CM

## 2025-01-21 DIAGNOSIS — Z12.11 ENCOUNTER FOR SCREENING FOR MALIGNANT NEOPLASM OF COLON: ICD-10-CM

## 2025-01-21 DIAGNOSIS — K31.A0 GASTRIC INTESTINAL METAPLASIA, UNSPECIFIED: ICD-10-CM

## 2025-01-21 DIAGNOSIS — R10.9 UNSPECIFIED ABDOMINAL PAIN: ICD-10-CM

## 2025-01-21 DIAGNOSIS — K21.9 GASTRO-ESOPHAGEAL REFLUX DISEASE WITHOUT ESOPHAGITIS: ICD-10-CM

## 2025-01-21 DIAGNOSIS — D13.1 BENIGN NEOPLASM OF STOMACH: ICD-10-CM

## 2025-01-27 ENCOUNTER — APPOINTMENT (OUTPATIENT)
Dept: PSYCHIATRY | Facility: CLINIC | Age: 55
End: 2025-01-27

## 2025-01-27 ENCOUNTER — OUTPATIENT (OUTPATIENT)
Dept: OUTPATIENT SERVICES | Facility: HOSPITAL | Age: 55
LOS: 1 days | End: 2025-01-27
Payer: COMMERCIAL

## 2025-01-27 DIAGNOSIS — Z98.890 OTHER SPECIFIED POSTPROCEDURAL STATES: Chronic | ICD-10-CM

## 2025-01-27 DIAGNOSIS — F41.9 ANXIETY DISORDER, UNSPECIFIED: ICD-10-CM

## 2025-01-27 PROCEDURE — 90832 PSYTX W PT 30 MINUTES: CPT

## 2025-01-28 DIAGNOSIS — F41.9 ANXIETY DISORDER, UNSPECIFIED: ICD-10-CM

## 2025-02-03 ENCOUNTER — OUTPATIENT (OUTPATIENT)
Dept: OUTPATIENT SERVICES | Facility: HOSPITAL | Age: 55
LOS: 1 days | End: 2025-02-03
Payer: COMMERCIAL

## 2025-02-03 ENCOUNTER — APPOINTMENT (OUTPATIENT)
Dept: PSYCHIATRY | Facility: CLINIC | Age: 55
End: 2025-02-03
Payer: COMMERCIAL

## 2025-02-03 DIAGNOSIS — F41.1 GENERALIZED ANXIETY DISORDER: ICD-10-CM

## 2025-02-03 DIAGNOSIS — Z98.890 OTHER SPECIFIED POSTPROCEDURAL STATES: Chronic | ICD-10-CM

## 2025-02-03 DIAGNOSIS — F43.12 POST-TRAUMATIC STRESS DISORDER, CHRONIC: ICD-10-CM

## 2025-02-03 DIAGNOSIS — F33.42 MAJOR DEPRESSIVE DISORDER, RECURRENT, IN FULL REMISSION: ICD-10-CM

## 2025-02-03 PROCEDURE — 99214 OFFICE O/P EST MOD 30 MIN: CPT | Mod: 95

## 2025-02-03 PROCEDURE — 98007 SYNCH AUDIO-VIDEO EST HI 40: CPT

## 2025-02-04 DIAGNOSIS — F41.1 GENERALIZED ANXIETY DISORDER: ICD-10-CM

## 2025-02-04 DIAGNOSIS — F43.12 POST-TRAUMATIC STRESS DISORDER, CHRONIC: ICD-10-CM

## 2025-02-04 DIAGNOSIS — F33.42 MAJOR DEPRESSIVE DISORDER, RECURRENT, IN FULL REMISSION: ICD-10-CM

## 2025-02-10 ENCOUNTER — APPOINTMENT (OUTPATIENT)
Dept: PSYCHIATRY | Facility: CLINIC | Age: 55
End: 2025-02-10

## 2025-02-10 ENCOUNTER — OUTPATIENT (OUTPATIENT)
Dept: OUTPATIENT SERVICES | Facility: HOSPITAL | Age: 55
LOS: 1 days | End: 2025-02-10
Payer: MEDICAID

## 2025-02-10 DIAGNOSIS — Z98.890 OTHER SPECIFIED POSTPROCEDURAL STATES: Chronic | ICD-10-CM

## 2025-02-10 PROCEDURE — 90832 PSYTX W PT 30 MINUTES: CPT

## 2025-02-24 ENCOUNTER — APPOINTMENT (OUTPATIENT)
Dept: PSYCHIATRY | Facility: CLINIC | Age: 55
End: 2025-02-24

## 2025-02-24 ENCOUNTER — OUTPATIENT (OUTPATIENT)
Dept: OUTPATIENT SERVICES | Facility: HOSPITAL | Age: 55
LOS: 1 days | End: 2025-02-24
Payer: COMMERCIAL

## 2025-02-24 DIAGNOSIS — Z98.890 OTHER SPECIFIED POSTPROCEDURAL STATES: Chronic | ICD-10-CM

## 2025-02-24 PROCEDURE — 90832 PSYTX W PT 30 MINUTES: CPT

## 2025-02-28 ENCOUNTER — OUTPATIENT (OUTPATIENT)
Dept: OUTPATIENT SERVICES | Facility: HOSPITAL | Age: 55
LOS: 1 days | End: 2025-02-28
Payer: MEDICAID

## 2025-02-28 ENCOUNTER — APPOINTMENT (OUTPATIENT)
Dept: INTERNAL MEDICINE | Facility: CLINIC | Age: 55
End: 2025-02-28
Payer: MEDICAID

## 2025-02-28 ENCOUNTER — NON-APPOINTMENT (OUTPATIENT)
Age: 55
End: 2025-02-28

## 2025-02-28 VITALS
WEIGHT: 160 LBS | HEART RATE: 69 BPM | SYSTOLIC BLOOD PRESSURE: 116 MMHG | HEIGHT: 67 IN | BODY MASS INDEX: 25.11 KG/M2 | OXYGEN SATURATION: 97 % | TEMPERATURE: 97 F | DIASTOLIC BLOOD PRESSURE: 75 MMHG

## 2025-02-28 DIAGNOSIS — K83.8 OTHER SPECIFIED DISEASES OF BILIARY TRACT: ICD-10-CM

## 2025-02-28 DIAGNOSIS — Z98.890 OTHER SPECIFIED POSTPROCEDURAL STATES: Chronic | ICD-10-CM

## 2025-02-28 DIAGNOSIS — G89.29 DORSALGIA, UNSPECIFIED: ICD-10-CM

## 2025-02-28 DIAGNOSIS — Z00.00 ENCOUNTER FOR GENERAL ADULT MEDICAL EXAMINATION WITHOUT ABNORMAL FINDINGS: ICD-10-CM

## 2025-02-28 DIAGNOSIS — M54.9 DORSALGIA, UNSPECIFIED: ICD-10-CM

## 2025-02-28 DIAGNOSIS — R74.8 ABNORMAL LEVELS OF OTHER SERUM ENZYMES: ICD-10-CM

## 2025-02-28 DIAGNOSIS — M54.16 RADICULOPATHY, LUMBAR REGION: ICD-10-CM

## 2025-02-28 DIAGNOSIS — J45.909 UNSPECIFIED ASTHMA, UNCOMPLICATED: ICD-10-CM

## 2025-02-28 DIAGNOSIS — R73.03 PREDIABETES.: ICD-10-CM

## 2025-02-28 PROCEDURE — 99214 OFFICE O/P EST MOD 30 MIN: CPT

## 2025-02-28 PROCEDURE — 99214 OFFICE O/P EST MOD 30 MIN: CPT | Mod: GC

## 2025-03-04 DIAGNOSIS — J45.909 UNSPECIFIED ASTHMA, UNCOMPLICATED: ICD-10-CM

## 2025-03-04 DIAGNOSIS — M54.16 RADICULOPATHY, LUMBAR REGION: ICD-10-CM

## 2025-03-04 DIAGNOSIS — K83.8 OTHER SPECIFIED DISEASES OF BILIARY TRACT: ICD-10-CM

## 2025-03-04 DIAGNOSIS — R73.03 PREDIABETES: ICD-10-CM

## 2025-03-10 ENCOUNTER — APPOINTMENT (OUTPATIENT)
Dept: PSYCHIATRY | Facility: CLINIC | Age: 55
End: 2025-03-10

## 2025-03-10 ENCOUNTER — OUTPATIENT (OUTPATIENT)
Dept: OUTPATIENT SERVICES | Facility: HOSPITAL | Age: 55
LOS: 1 days | End: 2025-03-10
Payer: COMMERCIAL

## 2025-03-10 DIAGNOSIS — Z98.890 OTHER SPECIFIED POSTPROCEDURAL STATES: Chronic | ICD-10-CM

## 2025-03-10 PROCEDURE — 90834 PSYTX W PT 45 MINUTES: CPT

## 2025-03-11 DIAGNOSIS — F41.9 ANXIETY DISORDER, UNSPECIFIED: ICD-10-CM

## 2025-03-17 ENCOUNTER — APPOINTMENT (OUTPATIENT)
Dept: PSYCHIATRY | Facility: CLINIC | Age: 55
End: 2025-03-17
Payer: COMMERCIAL

## 2025-03-17 ENCOUNTER — OUTPATIENT (OUTPATIENT)
Dept: OUTPATIENT SERVICES | Facility: HOSPITAL | Age: 55
LOS: 1 days | End: 2025-03-17
Payer: COMMERCIAL

## 2025-03-17 DIAGNOSIS — F33.42 MAJOR DEPRESSIVE DISORDER, RECURRENT, IN FULL REMISSION: ICD-10-CM

## 2025-03-17 DIAGNOSIS — F41.1 GENERALIZED ANXIETY DISORDER: ICD-10-CM

## 2025-03-17 DIAGNOSIS — Z98.890 OTHER SPECIFIED POSTPROCEDURAL STATES: Chronic | ICD-10-CM

## 2025-03-17 DIAGNOSIS — F43.12 POST-TRAUMATIC STRESS DISORDER, CHRONIC: ICD-10-CM

## 2025-03-17 PROCEDURE — 99214 OFFICE O/P EST MOD 30 MIN: CPT | Mod: 95

## 2025-03-17 PROCEDURE — 98007 SYNCH AUDIO-VIDEO EST HI 40: CPT

## 2025-03-18 DIAGNOSIS — F33.42 MAJOR DEPRESSIVE DISORDER, RECURRENT, IN FULL REMISSION: ICD-10-CM

## 2025-03-18 DIAGNOSIS — F41.1 GENERALIZED ANXIETY DISORDER: ICD-10-CM

## 2025-03-18 DIAGNOSIS — F43.12 POST-TRAUMATIC STRESS DISORDER, CHRONIC: ICD-10-CM

## 2025-03-24 ENCOUNTER — APPOINTMENT (OUTPATIENT)
Dept: PSYCHIATRY | Facility: CLINIC | Age: 55
End: 2025-03-24

## 2025-03-24 ENCOUNTER — OUTPATIENT (OUTPATIENT)
Dept: OUTPATIENT SERVICES | Facility: HOSPITAL | Age: 55
LOS: 1 days | End: 2025-03-24
Payer: COMMERCIAL

## 2025-03-24 DIAGNOSIS — Z98.890 OTHER SPECIFIED POSTPROCEDURAL STATES: Chronic | ICD-10-CM

## 2025-03-24 PROCEDURE — 90832 PSYTX W PT 30 MINUTES: CPT

## 2025-04-07 ENCOUNTER — OUTPATIENT (OUTPATIENT)
Dept: OUTPATIENT SERVICES | Facility: HOSPITAL | Age: 55
LOS: 1 days | End: 2025-04-07
Payer: COMMERCIAL

## 2025-04-07 ENCOUNTER — APPOINTMENT (OUTPATIENT)
Dept: PSYCHIATRY | Facility: CLINIC | Age: 55
End: 2025-04-07

## 2025-04-07 DIAGNOSIS — Z98.890 OTHER SPECIFIED POSTPROCEDURAL STATES: Chronic | ICD-10-CM

## 2025-04-07 PROCEDURE — 90832 PSYTX W PT 30 MINUTES: CPT

## 2025-04-08 DIAGNOSIS — F41.9 ANXIETY DISORDER, UNSPECIFIED: ICD-10-CM

## 2025-04-11 ENCOUNTER — APPOINTMENT (OUTPATIENT)
Dept: NUTRITION | Facility: CLINIC | Age: 55
End: 2025-04-11

## 2025-04-11 ENCOUNTER — OUTPATIENT (OUTPATIENT)
Dept: OUTPATIENT SERVICES | Facility: HOSPITAL | Age: 55
LOS: 1 days | End: 2025-04-11
Payer: MEDICAID

## 2025-04-11 DIAGNOSIS — Z98.890 OTHER SPECIFIED POSTPROCEDURAL STATES: Chronic | ICD-10-CM

## 2025-04-11 DIAGNOSIS — R73.03 PREDIABETES: ICD-10-CM

## 2025-04-11 PROCEDURE — 97803 MED NUTRITION INDIV SUBSEQ: CPT

## 2025-04-14 ENCOUNTER — OUTPATIENT (OUTPATIENT)
Dept: OUTPATIENT SERVICES | Facility: HOSPITAL | Age: 55
LOS: 1 days | End: 2025-04-14
Payer: MEDICAID

## 2025-04-14 ENCOUNTER — APPOINTMENT (OUTPATIENT)
Dept: PSYCHIATRY | Facility: CLINIC | Age: 55
End: 2025-04-14
Payer: COMMERCIAL

## 2025-04-14 DIAGNOSIS — F43.12 POST-TRAUMATIC STRESS DISORDER, CHRONIC: ICD-10-CM

## 2025-04-14 DIAGNOSIS — F33.42 MAJOR DEPRESSIVE DISORDER, RECURRENT, IN FULL REMISSION: ICD-10-CM

## 2025-04-14 DIAGNOSIS — Z98.890 OTHER SPECIFIED POSTPROCEDURAL STATES: Chronic | ICD-10-CM

## 2025-04-14 DIAGNOSIS — F41.1 GENERALIZED ANXIETY DISORDER: ICD-10-CM

## 2025-04-14 PROCEDURE — 99214 OFFICE O/P EST MOD 30 MIN: CPT | Mod: 95

## 2025-04-14 PROCEDURE — 98007 SYNCH AUDIO-VIDEO EST HI 40: CPT

## 2025-04-15 DIAGNOSIS — F33.42 MAJOR DEPRESSIVE DISORDER, RECURRENT, IN FULL REMISSION: ICD-10-CM

## 2025-04-15 DIAGNOSIS — F41.1 GENERALIZED ANXIETY DISORDER: ICD-10-CM

## 2025-04-15 DIAGNOSIS — F43.12 POST-TRAUMATIC STRESS DISORDER, CHRONIC: ICD-10-CM

## 2025-04-16 DIAGNOSIS — E66.9 OBESITY, UNSPECIFIED: ICD-10-CM

## 2025-04-28 ENCOUNTER — APPOINTMENT (OUTPATIENT)
Dept: PSYCHIATRY | Facility: CLINIC | Age: 55
End: 2025-04-28

## 2025-04-28 ENCOUNTER — OUTPATIENT (OUTPATIENT)
Dept: OUTPATIENT SERVICES | Facility: HOSPITAL | Age: 55
LOS: 1 days | End: 2025-04-28
Payer: COMMERCIAL

## 2025-04-28 DIAGNOSIS — Z98.890 OTHER SPECIFIED POSTPROCEDURAL STATES: Chronic | ICD-10-CM

## 2025-04-28 PROCEDURE — 90832 PSYTX W PT 30 MINUTES: CPT

## 2025-04-29 DIAGNOSIS — F41.9 ANXIETY DISORDER, UNSPECIFIED: ICD-10-CM

## 2025-05-12 ENCOUNTER — OUTPATIENT (OUTPATIENT)
Dept: OUTPATIENT SERVICES | Facility: HOSPITAL | Age: 55
LOS: 1 days | End: 2025-05-12
Payer: COMMERCIAL

## 2025-05-12 ENCOUNTER — APPOINTMENT (OUTPATIENT)
Dept: PSYCHIATRY | Facility: CLINIC | Age: 55
End: 2025-05-12

## 2025-05-12 DIAGNOSIS — Z98.890 OTHER SPECIFIED POSTPROCEDURAL STATES: Chronic | ICD-10-CM

## 2025-05-12 DIAGNOSIS — F41.9 ANXIETY DISORDER, UNSPECIFIED: ICD-10-CM

## 2025-05-12 PROCEDURE — 90834 PSYTX W PT 45 MINUTES: CPT

## 2025-05-13 ENCOUNTER — RX RENEWAL (OUTPATIENT)
Age: 55
End: 2025-05-13

## 2025-05-13 DIAGNOSIS — F41.9 ANXIETY DISORDER, UNSPECIFIED: ICD-10-CM

## 2025-05-23 ENCOUNTER — OUTPATIENT (OUTPATIENT)
Dept: OUTPATIENT SERVICES | Facility: HOSPITAL | Age: 55
LOS: 1 days | End: 2025-05-23
Payer: COMMERCIAL

## 2025-05-23 DIAGNOSIS — Z98.890 OTHER SPECIFIED POSTPROCEDURAL STATES: Chronic | ICD-10-CM

## 2025-05-23 DIAGNOSIS — R74.8 ABNORMAL LEVELS OF OTHER SERUM ENZYMES: ICD-10-CM

## 2025-05-23 PROCEDURE — 80053 COMPREHEN METABOLIC PANEL: CPT

## 2025-05-23 PROCEDURE — 80061 LIPID PANEL: CPT

## 2025-05-23 PROCEDURE — 85025 COMPLETE CBC W/AUTO DIFF WBC: CPT

## 2025-05-23 PROCEDURE — 83036 HEMOGLOBIN GLYCOSYLATED A1C: CPT

## 2025-05-24 DIAGNOSIS — R74.8 ABNORMAL LEVELS OF OTHER SERUM ENZYMES: ICD-10-CM

## 2025-05-27 ENCOUNTER — APPOINTMENT (OUTPATIENT)
Dept: PSYCHIATRY | Facility: CLINIC | Age: 55
End: 2025-05-27

## 2025-05-30 ENCOUNTER — NON-APPOINTMENT (OUTPATIENT)
Age: 55
End: 2025-05-30

## 2025-05-30 ENCOUNTER — APPOINTMENT (OUTPATIENT)
Dept: INTERNAL MEDICINE | Facility: CLINIC | Age: 55
End: 2025-05-30
Payer: MEDICAID

## 2025-05-30 ENCOUNTER — OUTPATIENT (OUTPATIENT)
Dept: OUTPATIENT SERVICES | Facility: HOSPITAL | Age: 55
LOS: 1 days | End: 2025-05-30
Payer: MEDICAID

## 2025-05-30 VITALS
WEIGHT: 156 LBS | TEMPERATURE: 97.7 F | SYSTOLIC BLOOD PRESSURE: 111 MMHG | HEART RATE: 6 BPM | DIASTOLIC BLOOD PRESSURE: 71 MMHG | OXYGEN SATURATION: 97 % | HEIGHT: 67 IN | BODY MASS INDEX: 24.48 KG/M2

## 2025-05-30 DIAGNOSIS — Z98.890 OTHER SPECIFIED POSTPROCEDURAL STATES: Chronic | ICD-10-CM

## 2025-05-30 DIAGNOSIS — R74.8 ABNORMAL LEVELS OF OTHER SERUM ENZYMES: ICD-10-CM

## 2025-05-30 DIAGNOSIS — J45.909 UNSPECIFIED ASTHMA, UNCOMPLICATED: ICD-10-CM

## 2025-05-30 DIAGNOSIS — R73.03 PREDIABETES.: ICD-10-CM

## 2025-05-30 DIAGNOSIS — F41.1 GENERALIZED ANXIETY DISORDER: ICD-10-CM

## 2025-05-30 DIAGNOSIS — K21.9 GASTRO-ESOPHAGEAL REFLUX DISEASE W/OUT ESOPHAGITIS: ICD-10-CM

## 2025-05-30 DIAGNOSIS — Z00.00 ENCOUNTER FOR GENERAL ADULT MEDICAL EXAMINATION WITHOUT ABNORMAL FINDINGS: ICD-10-CM

## 2025-05-30 DIAGNOSIS — E78.5 HYPERLIPIDEMIA, UNSPECIFIED: ICD-10-CM

## 2025-05-30 DIAGNOSIS — G47.00 INSOMNIA, UNSPECIFIED: ICD-10-CM

## 2025-05-30 PROCEDURE — 99214 OFFICE O/P EST MOD 30 MIN: CPT | Mod: GC

## 2025-05-30 PROCEDURE — 99214 OFFICE O/P EST MOD 30 MIN: CPT

## 2025-05-30 RX ORDER — DOCUSATE SODIUM 100 MG/1
100 CAPSULE, LIQUID FILLED ORAL
Qty: 60 | Refills: 5 | Status: ACTIVE | COMMUNITY
Start: 2025-05-30 | End: 1900-01-01

## 2025-05-30 RX ORDER — NAPROXEN 500 MG/1
500 TABLET ORAL
Qty: 20 | Refills: 2 | Status: ACTIVE | COMMUNITY
Start: 2025-05-30 | End: 1900-01-01

## 2025-06-02 ENCOUNTER — APPOINTMENT (OUTPATIENT)
Dept: PSYCHIATRY | Facility: CLINIC | Age: 55
End: 2025-06-02
Payer: COMMERCIAL

## 2025-06-02 ENCOUNTER — OUTPATIENT (OUTPATIENT)
Dept: OUTPATIENT SERVICES | Facility: HOSPITAL | Age: 55
LOS: 1 days | End: 2025-06-02
Payer: COMMERCIAL

## 2025-06-02 DIAGNOSIS — F43.12 POST-TRAUMATIC STRESS DISORDER, CHRONIC: ICD-10-CM

## 2025-06-02 DIAGNOSIS — F41.1 GENERALIZED ANXIETY DISORDER: ICD-10-CM

## 2025-06-02 DIAGNOSIS — Z98.890 OTHER SPECIFIED POSTPROCEDURAL STATES: Chronic | ICD-10-CM

## 2025-06-02 DIAGNOSIS — F33.42 MAJOR DEPRESSIVE DISORDER, RECURRENT, IN FULL REMISSION: ICD-10-CM

## 2025-06-02 PROCEDURE — 99214 OFFICE O/P EST MOD 30 MIN: CPT | Mod: 95

## 2025-06-02 PROCEDURE — 98007 SYNCH AUDIO-VIDEO EST HI 40: CPT

## 2025-06-03 DIAGNOSIS — F33.42 MAJOR DEPRESSIVE DISORDER, RECURRENT, IN FULL REMISSION: ICD-10-CM

## 2025-06-03 DIAGNOSIS — F41.1 GENERALIZED ANXIETY DISORDER: ICD-10-CM

## 2025-06-04 ENCOUNTER — NON-APPOINTMENT (OUTPATIENT)
Age: 55
End: 2025-06-04

## 2025-06-05 ENCOUNTER — RESULT REVIEW (OUTPATIENT)
Age: 55
End: 2025-06-05

## 2025-06-05 ENCOUNTER — OUTPATIENT (OUTPATIENT)
Dept: OUTPATIENT SERVICES | Facility: HOSPITAL | Age: 55
LOS: 1 days | End: 2025-06-05
Payer: MEDICAID

## 2025-06-05 DIAGNOSIS — Z98.890 OTHER SPECIFIED POSTPROCEDURAL STATES: Chronic | ICD-10-CM

## 2025-06-05 DIAGNOSIS — R92.8 OTHER ABNORMAL AND INCONCLUSIVE FINDINGS ON DIAGNOSTIC IMAGING OF BREAST: ICD-10-CM

## 2025-06-05 PROCEDURE — G0279: CPT | Mod: 26

## 2025-06-05 PROCEDURE — 77065 DX MAMMO INCL CAD UNI: CPT | Mod: RT

## 2025-06-05 PROCEDURE — 76642 ULTRASOUND BREAST LIMITED: CPT | Mod: 26,RT

## 2025-06-05 PROCEDURE — 77065 DX MAMMO INCL CAD UNI: CPT | Mod: 26,RT

## 2025-06-05 PROCEDURE — G0279: CPT

## 2025-06-05 PROCEDURE — 76642 ULTRASOUND BREAST LIMITED: CPT | Mod: RT

## 2025-06-06 DIAGNOSIS — R92.8 OTHER ABNORMAL AND INCONCLUSIVE FINDINGS ON DIAGNOSTIC IMAGING OF BREAST: ICD-10-CM

## 2025-06-09 ENCOUNTER — OUTPATIENT (OUTPATIENT)
Dept: OUTPATIENT SERVICES | Facility: HOSPITAL | Age: 55
LOS: 1 days | End: 2025-06-09
Payer: COMMERCIAL

## 2025-06-09 ENCOUNTER — APPOINTMENT (OUTPATIENT)
Dept: PSYCHIATRY | Facility: CLINIC | Age: 55
End: 2025-06-09

## 2025-06-09 DIAGNOSIS — Z98.890 OTHER SPECIFIED POSTPROCEDURAL STATES: Chronic | ICD-10-CM

## 2025-06-09 DIAGNOSIS — F33.3 MAJOR DEPRESSIVE DISORDER, RECURRENT, SEVERE WITH PSYCHOTIC SYMPTOMS: ICD-10-CM

## 2025-06-09 PROCEDURE — 90832 PSYTX W PT 30 MINUTES: CPT

## 2025-06-10 DIAGNOSIS — F33.3 MAJOR DEPRESSIVE DISORDER, RECURRENT, SEVERE WITH PSYCHOTIC SYMPTOMS: ICD-10-CM

## 2025-06-12 ENCOUNTER — RESULT REVIEW (OUTPATIENT)
Age: 55
End: 2025-06-12

## 2025-06-12 ENCOUNTER — OUTPATIENT (OUTPATIENT)
Dept: OUTPATIENT SERVICES | Facility: HOSPITAL | Age: 55
LOS: 1 days | Discharge: HOME | End: 2025-06-12
Payer: MEDICAID

## 2025-06-12 ENCOUNTER — TRANSCRIPTION ENCOUNTER (OUTPATIENT)
Age: 55
End: 2025-06-12

## 2025-06-12 VITALS
RESPIRATION RATE: 18 BRPM | SYSTOLIC BLOOD PRESSURE: 148 MMHG | OXYGEN SATURATION: 100 % | HEART RATE: 60 BPM | DIASTOLIC BLOOD PRESSURE: 69 MMHG

## 2025-06-12 VITALS
DIASTOLIC BLOOD PRESSURE: 90 MMHG | OXYGEN SATURATION: 97 % | SYSTOLIC BLOOD PRESSURE: 151 MMHG | RESPIRATION RATE: 18 BRPM | TEMPERATURE: 97 F | HEART RATE: 81 BPM | WEIGHT: 147.93 LBS

## 2025-06-12 DIAGNOSIS — Z98.890 OTHER SPECIFIED POSTPROCEDURAL STATES: Chronic | ICD-10-CM

## 2025-06-12 DIAGNOSIS — Z12.11 ENCOUNTER FOR SCREENING FOR MALIGNANT NEOPLASM OF COLON: ICD-10-CM

## 2025-06-12 PROCEDURE — 88305 TISSUE EXAM BY PATHOLOGIST: CPT | Mod: 26

## 2025-06-12 PROCEDURE — 88305 TISSUE EXAM BY PATHOLOGIST: CPT

## 2025-06-12 PROCEDURE — 45378 DIAGNOSTIC COLONOSCOPY: CPT

## 2025-06-12 PROCEDURE — 88312 SPECIAL STAINS GROUP 1: CPT | Mod: 26

## 2025-06-12 PROCEDURE — 43239 EGD BIOPSY SINGLE/MULTIPLE: CPT

## 2025-06-12 PROCEDURE — 88312 SPECIAL STAINS GROUP 1: CPT

## 2025-06-12 RX ORDER — DOCUSATE SODIUM 100 MG
1 CAPSULE ORAL
Refills: 0 | DISCHARGE

## 2025-06-12 RX ORDER — SENNA 187 MG
0 TABLET ORAL
Refills: 0 | DISCHARGE

## 2025-06-12 RX ORDER — DICLOFENAC SODIUM 75 MG/1
0 TABLET, DELAYED RELEASE ORAL
Refills: 0 | DISCHARGE

## 2025-06-12 RX ORDER — KETOCONAZOLE 20 MG/G
1 CREAM TOPICAL
Refills: 0 | DISCHARGE

## 2025-06-12 RX ORDER — DOCUSATE SODIUM 100 MG
15 CAPSULE ORAL
Refills: 0 | DISCHARGE

## 2025-06-12 RX ORDER — DOCUSATE SODIUM 100 MG
0 CAPSULE ORAL
Refills: 0 | DISCHARGE

## 2025-06-12 NOTE — ASU DISCHARGE PLAN (ADULT/PEDIATRIC) - ASU DC SPECIAL INSTRUCTIONSFT
- Follow up with our [facility] located at [address] [place] [address]. Phone Number: [phone]

## 2025-06-12 NOTE — ASU PATIENT PROFILE, ADULT - FALL HARM RISK - UNIVERSAL INTERVENTIONS
Bed in lowest position, wheels locked, appropriate side rails in place/Call bell, personal items and telephone in reach/Instruct patient to call for assistance before getting out of bed or chair/Non-slip footwear when patient is out of bed/[employer] to call system/Physically safe environment - no spills, clutter or unnecessary equipment/Purposeful Proactive Rounding/Room/bathroom lighting operational, light cord in reach

## 2025-06-12 NOTE — ASU PATIENT PROFILE, ADULT - AS SC BRADEN MOISTURE
DOS: 10-28  Patient was instructed to take the following medications on the day of surgery: Lyrica and Omeprazole.  Instructions verified with patient per anesthesia protocol.  Patient is aware of Covid testing scheduled for 10/26 and hospital guidelines regarding COVID-19 and the flu. Patient will notify MD office if experiencing fever, nausea, vomiting, etc. to reschedule. Patient verbalized understanding, no further questions. Testing is needed DOS.   (4) rarely moist

## 2025-06-12 NOTE — CHART NOTE - NSCHARTNOTEFT_GEN_A_CORE
PACU ANESTHESIA ADMISSION NOTE      Procedure:   Post op diagnosis:      ____  Intubated  TV:______       Rate: ______      FiO2: ______    _x___  Patent Airway    _x___  Full return of protective reflexes    _x___  Full recovery from anesthesia / back to baseline status    Vitals:  T(C): 36.3 (06-12-25 @ 09:37), Max: 36.3 (06-12-25 @ 09:37)  HR: 60 (06-12-25 @ 10:07) (60 - 81)  BP: 148/69 (06-12-25 @ 10:07) (122/62 - 151/90)  RR: 18 (06-12-25 @ 10:07) (14 - 18)  SpO2: 100% (06-12-25 @ 10:07) (97% - 100%)    Mental Status:  _x___ Awake   _____ Alert   _____ Drowsy   _____ Sedated    Nausea/Vomiting:  _x___  NO       ______Yes,   See Post - Op Orders         Pain Scale (0-10):  __0___    Treatment: _x___ None    ____ See Post - Op/PCA Orders    Post - Operative Fluids:   __x__ Oral   ____ See Post - Op Orders    Plan: Discharge:   _x___Home       _____Floor     _____Critical Care    _____  Other:_________________    Comments:  No anesthesia issues or complications noted.  Discharge when criteria met.

## 2025-06-12 NOTE — ASU PATIENT PROFILE, ADULT - NSICDXPASTSURGICALHX_GEN_ALL_CORE_FT
PAST SURGICAL HISTORY:  H/O neck surgery     History of back surgery stimulator in back    History of cholecystectomy

## 2025-06-12 NOTE — ASU DISCHARGE PLAN (ADULT/PEDIATRIC) - FINANCIAL ASSISTANCE
[facility] provides services at a reduced cost to those who are determined to be eligible through [facility]’s financial assistance program. Information regarding [facility]’s financial assistance program can be found by going to [email or web address] or by calling [phone].

## 2025-06-13 LAB
SURGICAL PATHOLOGY STUDY: SIGNIFICANT CHANGE UP
SURGICAL PATHOLOGY STUDY: SIGNIFICANT CHANGE UP

## 2025-06-15 NOTE — ASU PREOP CHECKLIST - VERIFY SURGICAL SITE/SIDE WITH PATIENT
Physical Therapy    Visit Type: initial evaluation, treatment and discharge  SUBJECTIVE  Patient verbally agrees to allow the following to be present during session: spouse  OB Information:  Delivery Type: vaginal: 25    Delivery Description: vaginal delivery with repaired vaginal and B labial lacerations     Living Situation:   Lives with spouse and their dog in a house.  Stairs: 12 to second level  Railings:1  Home Equipment: none    Prior Level of Function:  independent  with mobility, household mobility, community mobility, ADL, and IADL.  Patient / Family Goal: return to previous functional status, return home, home exercise education and maximize function    Pain   Patient reports pain is not an issue/concern.    Location: Lower abdominal and pelvic discomfort noted following delivery     OBJECTIVE     Cognitive Status   Orientation    - Oriented to: person, place, time and situation  Functional Communication   - Overall Communication Status: within functional limits  Attention Span    - Attention: intact  Following Direction   - follows all commands and directions consistently  Safety Awareness/Insight   - intact        Bed Mobility  - Rolling right: modified independent  - Side-lying to sit: modified independent  - Sit to supine: modified independent  HOB partially elevated approx 30 degrees.   Transfers  Assistive devices: none  - Sit to stand: modified independent  - Stand to sit: modified independent    Ambulation / Gait  - Assistive device: no assistive device  - Distance (feet unless otherwise indicated): 50  - Assist Level: modified independent  - Description: forward flexed at hips, wide base of support and antalgic    Patient denies dizziness, lightheadedness and LE weakness with ambulation.       Therapeutic Activity  Patient educated on neutral spine posture, use of support to maintain posture. Neutral spine body mechanics instructed and practiced for activities and movements specific to  the following marked  tasks:  [x]  Squatting and lifting  [ ]  Pushing and pulling   [ ]  Reaching and carrying (car seat)  [x]  Standing and sitting  [x]  Bed mobility rolling technique  [ ]  Car transfer technique  [ ]  Dressing and single leg stance weight shift with hip hinge technique  [x]  Feeding positioning    Patient able to return demonstration of the above techniques. Issued written materials for review and reinforcement.    Patient instructed in scar management of closed scars:  - Educated on healing expectations for vaginal scar  - Educated that scar mobilization and manipulation will be an important part of recovery and this is a main reason to follow up in the outpatient setting    Reinforced lifting restriction during postpartum recovery due to diminished pelvic floor support and core weakness. Advised patient to be aware of over exerting herself, being cautious of not causing injury or straining her muscles as she increases her activity tolerance and returns to lifting heavier items.      Encouraged the use of consistent and frequent cold therapy a perineal area to address swelling and inflammation.     Patient inquired about return to exercise and increasing her activity level during postpartum recovery specifically related to walking outside. Advised patient on the practice of activity pacing, being intune with her body related to fatigue level, muscle soreness and common signs to be aware of indicating she may have overextended herself.      Bed Mobility:  - Patient completed supine to sit with modified independence. Educated on utilizing log roll technique. Patient demonstrates understanding.     Neuromuscular Re-Education  Diaphragmatic Breathing/Relaxation Instruction:  - Lie in a comfortable position or sit upright in a chair with feet firmly on the ground or on the floor with your legs crossed  - Close your eyes  - Take deep breaths in through your nose, allowing your abdomen, rib  cage and pelvic floor to expand  - Gently exhale through your mouth, allowing your abdomen, rib cage and pelvic floor to relax  - Repeat several times for up to 10 minutes    Transverse Abdominus Engagement Instruction:   - Patient instructed in the co-contraction of transverse abdominis and pelvic floor muscles to begin strengthening the deep core muscles  - Educated how this helps support pelvis and restore posture and proper muscle activation  - Instructed patient to do this in multiple positions laying, sitting, standing, hands and knees, mini squat  - Tactile and verbal cuing to isolate lower abdominals  - Patient taught to self-monitor with hands, advised that no pain should be felt in back and pelvis/spine should not move    Gait Training  Patient ambulated 50 feet with modified independence.      Instructed patient on negotiating steps using sideways technique to improve functional mobility, safety, and independence. Patient declines stair trial and expresses confidence in her ability to safely complete upon return to home.     Skilled input: verbal instruction/cues, tactile instruction/cues, demonstration, posture correction and as detailed above    Writer verbally educated and received verbal consent for hand placement, positioning of patient, and techniques to be performed today from patient for clothing adjustments for techniques, hand placement and palpation for techniques and therapist position for techniques as described above and how they are pertinent to the patient's plan of care.  Home Exercise Program  Access Code: [email or web address]  URL: [email or web address]  Date: 06/15/2025  Prepared by: Kaylyn Puckett    Exercises  - Seated Diaphragmatic Breathing  - 3-5 x daily - 5-10 reps  - Seated Transversus Abdominis Bracing  - 3-5 x daily - 5-10 reps    Patient Education  -  Postpartum Booklet   -  Vaginal Scar Mobilization  - Healthy Posture: How to Hold and Lift a Baby  - Lifting  and Lowering Baby to a Changing Table   - Squat Lift with a Baby         Education:   - Present and ready to learn: patient  Education provided during session:  - safety, proper body mechanics, pain management, fall prevention, role of PT, precautions, energy conservation, bed mobility techniques, ADL strategies, compensatory techniques, gait training, positioning and transfer technique  - Results of above outlined education: Verbalizes understanding and Demonstrates understanding    ASSESSMENT   Patient will benefit from skilled therapy to address listed impairments and functional limitations.    Discharge needs based on today's assessment:   - Current level of function: slightly below baseline level of function   - Therapy needs at discharge: outpatient therapy 1-3 times per week   - Activities of daily living (ADLs) requiring support at discharge: bed mobility, transfers, ambulation and stairs   - Impairments that require further therapy intervention: activity tolerance, balance, pain, strength, safety awareness, coordination and ROM    The patient was seen on [facility] nursing unit. Patient was able to demonstrate Modified [place] (mod I) with functional mobility. Patient able to perform all childcare tasks with appropriate body mechanics. Patient verbalized understanding of home exercise program and patient education and therefore does not require further skilled physical therapy services. Safe to discharge home when medically stable.        Predicted patient presentation: Low (stable) - Patient comorbidities and complexities, as defined above, will have little effect on progress for prescribed plan of care.    PLAN (while hospitalized)  Suggestions for next session as indicated: Discharged from  Physical Therapy. Patient informed of the benefits of outpatient pelvic health physical therapy to address common impairments associated with pregnancy and post partum recovery. Encouraged patient to participate in  initial assessment at 3-4 weeks post partum with follow up sessions at 6 and 8 weeks postpartum. Patient declined scheduling OP pelvic health physical therapy appointments with writer, but expressed interest in receiving services. Patient inquired about insurance coverage related to OP pelvic floor physical therapy, offered to provide patient with the phone number to speak with a  representative to receive cost estimate for OP PT appointments, patient receptive. Patient will be contacted to schedule postpartum physical therapy appointment. Follow up with outpatient skilled physical therapy.    Interventions: bed mobility, body mechanics, functional transfer training, gait training, neuromuscular re-education, HEP train/position, patient/family training, strengthening, stairs retraining and compensatory technique education  Agreement to plan and goals: patient agrees with goals and treatment plan      GOALS  Short Term Goals (STGs): to be met 7 days from date established, unless otherwise stated.  Patient will be independent with a modified and progressive home exercise program.  Patient able to verbalize understanding of education regarding diaphragmatic breathing, abdominal bracing, and scar management.  Status: all STGs met  Long Term Goals (LTGs): (to be met by time of discharge from hospital)  Patient will be able to complete bed mobility modified independent, with minimal pain.  Patient will be able to complete modified independent transfers with minimal pain/difficulty.  Patient will be able to lift baby (10 lbs) with minimal pain/difficulty to improve  activities.   Patient will be able to ambulate for 50 feet with minimal pain/difficulty.  Patient will be able to ascend and descend curb step side step-to pattern with minimal pain/difficulty.   Status: all LTGs met  Documented in the chart in the following areas:  Assessment/Plan.     Patient at End of Session:   Location: in  bed  Handoff to: nurse        Therapy procedure time and total treatment time can be found documented on the Time Entry flowsheet   done

## 2025-06-17 DIAGNOSIS — Z12.11 ENCOUNTER FOR SCREENING FOR MALIGNANT NEOPLASM OF COLON: ICD-10-CM

## 2025-06-17 DIAGNOSIS — K64.8 OTHER HEMORRHOIDS: ICD-10-CM

## 2025-06-17 DIAGNOSIS — Z91.013 ALLERGY TO SEAFOOD: ICD-10-CM

## 2025-06-17 DIAGNOSIS — J45.909 UNSPECIFIED ASTHMA, UNCOMPLICATED: ICD-10-CM

## 2025-06-17 DIAGNOSIS — K21.9 GASTRO-ESOPHAGEAL REFLUX DISEASE WITHOUT ESOPHAGITIS: ICD-10-CM

## 2025-06-17 DIAGNOSIS — F32.A DEPRESSION, UNSPECIFIED: ICD-10-CM

## 2025-06-17 DIAGNOSIS — K44.9 DIAPHRAGMATIC HERNIA WITHOUT OBSTRUCTION OR GANGRENE: ICD-10-CM

## 2025-06-17 DIAGNOSIS — K64.4 RESIDUAL HEMORRHOIDAL SKIN TAGS: ICD-10-CM

## 2025-06-17 DIAGNOSIS — Z91.011 ALLERGY TO MILK PRODUCTS: ICD-10-CM

## 2025-06-17 DIAGNOSIS — K57.30 DIVERTICULOSIS OF LARGE INTESTINE WITHOUT PERFORATION OR ABSCESS WITHOUT BLEEDING: ICD-10-CM

## 2025-06-17 DIAGNOSIS — Z79.890 HORMONE REPLACEMENT THERAPY: ICD-10-CM

## 2025-06-23 ENCOUNTER — OUTPATIENT (OUTPATIENT)
Dept: OUTPATIENT SERVICES | Facility: HOSPITAL | Age: 55
LOS: 1 days | End: 2025-06-23
Payer: COMMERCIAL

## 2025-06-23 ENCOUNTER — APPOINTMENT (OUTPATIENT)
Dept: PSYCHIATRY | Facility: CLINIC | Age: 55
End: 2025-06-23

## 2025-06-23 DIAGNOSIS — Z98.890 OTHER SPECIFIED POSTPROCEDURAL STATES: Chronic | ICD-10-CM

## 2025-06-23 PROCEDURE — 90834 PSYTX W PT 45 MINUTES: CPT

## 2025-06-27 ENCOUNTER — OUTPATIENT (OUTPATIENT)
Dept: OUTPATIENT SERVICES | Facility: HOSPITAL | Age: 55
LOS: 1 days | End: 2025-06-27
Payer: COMMERCIAL

## 2025-06-27 ENCOUNTER — APPOINTMENT (OUTPATIENT)
Dept: NUTRITION | Facility: CLINIC | Age: 55
End: 2025-06-27

## 2025-06-27 DIAGNOSIS — Z98.890 OTHER SPECIFIED POSTPROCEDURAL STATES: Chronic | ICD-10-CM

## 2025-06-27 DIAGNOSIS — Z00.00 ENCOUNTER FOR GENERAL ADULT MEDICAL EXAMINATION WITHOUT ABNORMAL FINDINGS: ICD-10-CM

## 2025-06-27 PROCEDURE — 97803 MED NUTRITION INDIV SUBSEQ: CPT

## 2025-06-30 DIAGNOSIS — E66.9 OBESITY, UNSPECIFIED: ICD-10-CM

## 2025-07-07 ENCOUNTER — APPOINTMENT (OUTPATIENT)
Dept: PSYCHIATRY | Facility: CLINIC | Age: 55
End: 2025-07-07

## 2025-07-14 ENCOUNTER — APPOINTMENT (OUTPATIENT)
Dept: PSYCHIATRY | Facility: CLINIC | Age: 55
End: 2025-07-14

## 2025-07-28 ENCOUNTER — OUTPATIENT (OUTPATIENT)
Dept: OUTPATIENT SERVICES | Facility: HOSPITAL | Age: 55
LOS: 1 days | End: 2025-07-28
Payer: COMMERCIAL

## 2025-07-28 ENCOUNTER — APPOINTMENT (OUTPATIENT)
Dept: PSYCHIATRY | Facility: CLINIC | Age: 55
End: 2025-07-28

## 2025-07-28 DIAGNOSIS — Z98.890 OTHER SPECIFIED POSTPROCEDURAL STATES: Chronic | ICD-10-CM

## 2025-07-28 DIAGNOSIS — F33.3 MAJOR DEPRESSIVE DISORDER, RECURRENT, SEVERE WITH PSYCHOTIC SYMPTOMS: ICD-10-CM

## 2025-07-28 PROCEDURE — 90832 PSYTX W PT 30 MINUTES: CPT

## 2025-07-29 DIAGNOSIS — F33.3 MAJOR DEPRESSIVE DISORDER, RECURRENT, SEVERE WITH PSYCHOTIC SYMPTOMS: ICD-10-CM

## 2025-08-04 ENCOUNTER — APPOINTMENT (OUTPATIENT)
Dept: PSYCHIATRY | Facility: CLINIC | Age: 55
End: 2025-08-04
Payer: COMMERCIAL

## 2025-08-04 ENCOUNTER — OUTPATIENT (OUTPATIENT)
Dept: OUTPATIENT SERVICES | Facility: HOSPITAL | Age: 55
LOS: 1 days | End: 2025-08-04
Payer: COMMERCIAL

## 2025-08-04 DIAGNOSIS — F43.12 POST-TRAUMATIC STRESS DISORDER, CHRONIC: ICD-10-CM

## 2025-08-04 DIAGNOSIS — Z98.890 OTHER SPECIFIED POSTPROCEDURAL STATES: Chronic | ICD-10-CM

## 2025-08-04 DIAGNOSIS — F41.1 GENERALIZED ANXIETY DISORDER: ICD-10-CM

## 2025-08-04 DIAGNOSIS — F43.10 POST-TRAUMATIC STRESS DISORDER, UNSPECIFIED: ICD-10-CM

## 2025-08-04 DIAGNOSIS — F33.42 MAJOR DEPRESSIVE DISORDER, RECURRENT, IN FULL REMISSION: ICD-10-CM

## 2025-08-04 PROCEDURE — 98007 SYNCH AUDIO-VIDEO EST HI 40: CPT

## 2025-08-04 PROCEDURE — 90833 PSYTX W PT W E/M 30 MIN: CPT | Mod: 95

## 2025-08-04 PROCEDURE — 90833 PSYTX W PT W E/M 30 MIN: CPT

## 2025-08-04 PROCEDURE — 99214 OFFICE O/P EST MOD 30 MIN: CPT | Mod: 95

## 2025-08-05 DIAGNOSIS — F43.12 POST-TRAUMATIC STRESS DISORDER, CHRONIC: ICD-10-CM

## 2025-08-05 DIAGNOSIS — F33.42 MAJOR DEPRESSIVE DISORDER, RECURRENT, IN FULL REMISSION: ICD-10-CM

## 2025-08-05 DIAGNOSIS — F41.1 GENERALIZED ANXIETY DISORDER: ICD-10-CM

## 2025-08-11 ENCOUNTER — OUTPATIENT (OUTPATIENT)
Dept: OUTPATIENT SERVICES | Facility: HOSPITAL | Age: 55
LOS: 1 days | End: 2025-08-11
Payer: COMMERCIAL

## 2025-08-11 ENCOUNTER — APPOINTMENT (OUTPATIENT)
Dept: HEPATOLOGY | Facility: CLINIC | Age: 55
End: 2025-08-11

## 2025-08-11 ENCOUNTER — APPOINTMENT (OUTPATIENT)
Dept: PSYCHIATRY | Facility: CLINIC | Age: 55
End: 2025-08-11

## 2025-08-11 DIAGNOSIS — F41.9 ANXIETY DISORDER, UNSPECIFIED: ICD-10-CM

## 2025-08-11 DIAGNOSIS — Z98.890 OTHER SPECIFIED POSTPROCEDURAL STATES: Chronic | ICD-10-CM

## 2025-08-11 PROCEDURE — 90832 PSYTX W PT 30 MINUTES: CPT

## 2025-08-12 DIAGNOSIS — F41.9 ANXIETY DISORDER, UNSPECIFIED: ICD-10-CM

## 2025-08-20 ENCOUNTER — APPOINTMENT (OUTPATIENT)
Dept: GASTROENTEROLOGY | Facility: CLINIC | Age: 55
End: 2025-08-20
Payer: MEDICAID

## 2025-08-20 ENCOUNTER — OUTPATIENT (OUTPATIENT)
Dept: OUTPATIENT SERVICES | Facility: HOSPITAL | Age: 55
LOS: 1 days | End: 2025-08-20
Payer: MEDICAID

## 2025-08-20 ENCOUNTER — LABORATORY RESULT (OUTPATIENT)
Age: 55
End: 2025-08-20

## 2025-08-20 ENCOUNTER — NON-APPOINTMENT (OUTPATIENT)
Age: 55
End: 2025-08-20

## 2025-08-20 VITALS
WEIGHT: 148 LBS | SYSTOLIC BLOOD PRESSURE: 104 MMHG | HEIGHT: 67 IN | BODY MASS INDEX: 23.23 KG/M2 | DIASTOLIC BLOOD PRESSURE: 71 MMHG | OXYGEN SATURATION: 98 % | HEART RATE: 69 BPM

## 2025-08-20 DIAGNOSIS — Z00.00 ENCOUNTER FOR GENERAL ADULT MEDICAL EXAMINATION WITHOUT ABNORMAL FINDINGS: ICD-10-CM

## 2025-08-20 DIAGNOSIS — R74.8 ABNORMAL LEVELS OF OTHER SERUM ENZYMES: ICD-10-CM

## 2025-08-20 DIAGNOSIS — K21.9 GASTRO-ESOPHAGEAL REFLUX DISEASE W/OUT ESOPHAGITIS: ICD-10-CM

## 2025-08-20 DIAGNOSIS — R10.9 UNSPECIFIED ABDOMINAL PAIN: ICD-10-CM

## 2025-08-20 DIAGNOSIS — Z98.890 OTHER SPECIFIED POSTPROCEDURAL STATES: Chronic | ICD-10-CM

## 2025-08-20 DIAGNOSIS — K21.9 GASTRO-ESOPHAGEAL REFLUX DISEASE WITHOUT ESOPHAGITIS: ICD-10-CM

## 2025-08-20 PROCEDURE — 83036 HEMOGLOBIN GLYCOSYLATED A1C: CPT

## 2025-08-20 PROCEDURE — 84443 ASSAY THYROID STIM HORMONE: CPT

## 2025-08-20 PROCEDURE — 99214 OFFICE O/P EST MOD 30 MIN: CPT

## 2025-08-20 PROCEDURE — 85027 COMPLETE CBC AUTOMATED: CPT

## 2025-08-20 PROCEDURE — 87086 URINE CULTURE/COLONY COUNT: CPT

## 2025-08-20 PROCEDURE — 82306 VITAMIN D 25 HYDROXY: CPT

## 2025-08-20 PROCEDURE — 80053 COMPREHEN METABOLIC PANEL: CPT

## 2025-08-20 PROCEDURE — 81001 URINALYSIS AUTO W/SCOPE: CPT

## 2025-08-20 PROCEDURE — 80061 LIPID PANEL: CPT

## 2025-08-21 DIAGNOSIS — R74.8 ABNORMAL LEVELS OF OTHER SERUM ENZYMES: ICD-10-CM

## 2025-08-25 ENCOUNTER — OUTPATIENT (OUTPATIENT)
Dept: OUTPATIENT SERVICES | Facility: HOSPITAL | Age: 55
LOS: 1 days | End: 2025-08-25
Payer: COMMERCIAL

## 2025-08-25 ENCOUNTER — APPOINTMENT (OUTPATIENT)
Dept: PSYCHIATRY | Facility: CLINIC | Age: 55
End: 2025-08-25

## 2025-08-25 DIAGNOSIS — Z98.890 OTHER SPECIFIED POSTPROCEDURAL STATES: Chronic | ICD-10-CM

## 2025-08-25 PROCEDURE — 90832 PSYTX W PT 30 MINUTES: CPT

## 2025-08-28 ENCOUNTER — OUTPATIENT (OUTPATIENT)
Dept: OUTPATIENT SERVICES | Facility: HOSPITAL | Age: 55
LOS: 1 days | End: 2025-08-28
Payer: MEDICAID

## 2025-08-28 ENCOUNTER — APPOINTMENT (OUTPATIENT)
Dept: DERMATOLOGY | Facility: CLINIC | Age: 55
End: 2025-08-28

## 2025-08-28 DIAGNOSIS — Z00.00 ENCOUNTER FOR GENERAL ADULT MEDICAL EXAMINATION WITHOUT ABNORMAL FINDINGS: ICD-10-CM

## 2025-08-28 DIAGNOSIS — L21.9 SEBORRHEIC DERMATITIS, UNSPECIFIED: ICD-10-CM

## 2025-08-28 DIAGNOSIS — L82.1 OTHER SEBORRHEIC KERATOSIS: ICD-10-CM

## 2025-08-28 DIAGNOSIS — Z98.890 OTHER SPECIFIED POSTPROCEDURAL STATES: Chronic | ICD-10-CM

## 2025-08-28 DIAGNOSIS — L65.9 NONSCARRING HAIR LOSS, UNSPECIFIED: ICD-10-CM

## 2025-08-28 PROCEDURE — 99213 OFFICE O/P EST LOW 20 MIN: CPT

## 2025-08-28 PROCEDURE — 17110 DESTRUCTION B9 LES UP TO 14: CPT

## 2025-09-03 DIAGNOSIS — L82.1 OTHER SEBORRHEIC KERATOSIS: ICD-10-CM

## 2025-09-03 DIAGNOSIS — B07.9 VIRAL WART, UNSPECIFIED: ICD-10-CM

## 2025-09-03 DIAGNOSIS — L21.9 SEBORRHEIC DERMATITIS, UNSPECIFIED: ICD-10-CM

## 2025-09-15 ENCOUNTER — APPOINTMENT (OUTPATIENT)
Dept: PSYCHIATRY | Facility: CLINIC | Age: 55
End: 2025-09-15
Payer: COMMERCIAL

## 2025-09-15 PROCEDURE — 99214 OFFICE O/P EST MOD 30 MIN: CPT | Mod: 95

## 2025-09-17 ENCOUNTER — APPOINTMENT (OUTPATIENT)
Dept: PSYCHIATRY | Facility: CLINIC | Age: 55
End: 2025-09-17